# Patient Record
Sex: MALE | Race: WHITE | Employment: OTHER | URBAN - METROPOLITAN AREA
[De-identification: names, ages, dates, MRNs, and addresses within clinical notes are randomized per-mention and may not be internally consistent; named-entity substitution may affect disease eponyms.]

---

## 2017-01-11 ENCOUNTER — CARE COORDINATION (OUTPATIENT)
Dept: CARE COORDINATION | Age: 57
End: 2017-01-11

## 2017-01-12 ENCOUNTER — CARE COORDINATION (OUTPATIENT)
Dept: CARE COORDINATION | Age: 57
End: 2017-01-12

## 2017-01-12 ENCOUNTER — OFFICE VISIT (OUTPATIENT)
Dept: ORTHOPEDIC SURGERY | Age: 57
End: 2017-01-12

## 2017-01-12 VITALS
HEIGHT: 70 IN | SYSTOLIC BLOOD PRESSURE: 119 MMHG | BODY MASS INDEX: 35.79 KG/M2 | WEIGHT: 250 LBS | DIASTOLIC BLOOD PRESSURE: 79 MMHG | HEART RATE: 76 BPM

## 2017-01-12 DIAGNOSIS — M17.10 LOCALIZED OSTEOARTHROSIS, LOWER LEG: ICD-10-CM

## 2017-01-12 DIAGNOSIS — Z96.652 STATUS POST TOTAL LEFT KNEE REPLACEMENT: Primary | ICD-10-CM

## 2017-01-12 PROCEDURE — 99024 POSTOP FOLLOW-UP VISIT: CPT | Performed by: ORTHOPAEDIC SURGERY

## 2017-01-16 ENCOUNTER — OFFICE VISIT (OUTPATIENT)
Dept: PAIN MANAGEMENT | Age: 57
End: 2017-01-16

## 2017-01-16 VITALS
SYSTOLIC BLOOD PRESSURE: 131 MMHG | BODY MASS INDEX: 36.79 KG/M2 | WEIGHT: 257 LBS | HEART RATE: 73 BPM | DIASTOLIC BLOOD PRESSURE: 78 MMHG

## 2017-01-16 DIAGNOSIS — G89.4 CHRONIC PAIN SYNDROME: ICD-10-CM

## 2017-01-16 DIAGNOSIS — M54.16 LUMBAR RADICULITIS: ICD-10-CM

## 2017-01-16 DIAGNOSIS — M50.30 DDD (DEGENERATIVE DISC DISEASE), CERVICAL: ICD-10-CM

## 2017-01-16 DIAGNOSIS — M51.36 DDD (DEGENERATIVE DISC DISEASE), LUMBAR: ICD-10-CM

## 2017-01-16 DIAGNOSIS — M17.12 PRIMARY OSTEOARTHRITIS OF LEFT KNEE: ICD-10-CM

## 2017-01-16 DIAGNOSIS — M79.18 MYOFASCIAL PAIN: ICD-10-CM

## 2017-01-16 PROCEDURE — G8484 FLU IMMUNIZE NO ADMIN: HCPCS | Performed by: INTERNAL MEDICINE

## 2017-01-16 PROCEDURE — G8419 CALC BMI OUT NRM PARAM NOF/U: HCPCS | Performed by: INTERNAL MEDICINE

## 2017-01-16 PROCEDURE — 1036F TOBACCO NON-USER: CPT | Performed by: INTERNAL MEDICINE

## 2017-01-16 PROCEDURE — 99213 OFFICE O/P EST LOW 20 MIN: CPT | Performed by: INTERNAL MEDICINE

## 2017-01-16 PROCEDURE — 3017F COLORECTAL CA SCREEN DOC REV: CPT | Performed by: INTERNAL MEDICINE

## 2017-01-16 PROCEDURE — G8427 DOCREV CUR MEDS BY ELIG CLIN: HCPCS | Performed by: INTERNAL MEDICINE

## 2017-01-16 RX ORDER — LUBIPROSTONE 24 UG/1
24 CAPSULE, GELATIN COATED ORAL 2 TIMES DAILY WITH MEALS
Qty: 60 CAPSULE | Refills: 0 | Status: SHIPPED | OUTPATIENT
Start: 2017-01-16 | End: 2017-02-14 | Stop reason: SDUPTHER

## 2017-01-16 RX ORDER — DULOXETIN HYDROCHLORIDE 60 MG/1
CAPSULE, DELAYED RELEASE ORAL
Qty: 30 CAPSULE | Refills: 0 | Status: SHIPPED | OUTPATIENT
Start: 2017-01-16 | End: 2017-02-14 | Stop reason: SDUPTHER

## 2017-01-16 RX ORDER — OXYCODONE AND ACETAMINOPHEN 7.5; 325 MG/1; MG/1
1 TABLET ORAL EVERY 6 HOURS PRN
Qty: 112 TABLET | Refills: 0 | Status: SHIPPED | OUTPATIENT
Start: 2017-01-16 | End: 2017-02-14 | Stop reason: SDUPTHER

## 2017-01-16 RX ORDER — AMITRIPTYLINE HYDROCHLORIDE 25 MG/1
25 TABLET, FILM COATED ORAL NIGHTLY
Qty: 30 TABLET | Refills: 0 | Status: SHIPPED | OUTPATIENT
Start: 2017-01-16 | End: 2017-02-14 | Stop reason: SDUPTHER

## 2017-01-16 RX ORDER — PREGABALIN 75 MG/1
CAPSULE ORAL
Qty: 90 CAPSULE | Refills: 0 | Status: SHIPPED | OUTPATIENT
Start: 2017-01-16 | End: 2017-02-14 | Stop reason: SDUPTHER

## 2017-01-18 RX ORDER — LOSARTAN POTASSIUM 100 MG/1
TABLET ORAL
Qty: 90 TABLET | Refills: 0 | Status: SHIPPED | OUTPATIENT
Start: 2017-01-18 | End: 2017-02-15 | Stop reason: SDUPTHER

## 2017-01-18 RX ORDER — AMLODIPINE BESYLATE 10 MG/1
TABLET ORAL
Qty: 30 TABLET | Refills: 3 | Status: SHIPPED | OUTPATIENT
Start: 2017-01-18 | End: 2017-05-09 | Stop reason: SDUPTHER

## 2017-02-14 ENCOUNTER — OFFICE VISIT (OUTPATIENT)
Dept: PAIN MANAGEMENT | Age: 57
End: 2017-02-14

## 2017-02-14 VITALS
HEART RATE: 74 BPM | DIASTOLIC BLOOD PRESSURE: 94 MMHG | BODY MASS INDEX: 36.94 KG/M2 | WEIGHT: 258 LBS | SYSTOLIC BLOOD PRESSURE: 150 MMHG

## 2017-02-14 DIAGNOSIS — M54.16 LUMBAR RADICULITIS: ICD-10-CM

## 2017-02-14 DIAGNOSIS — G89.4 CHRONIC PAIN SYNDROME: ICD-10-CM

## 2017-02-14 DIAGNOSIS — M17.10 LOCALIZED OSTEOARTHROSIS, LOWER LEG: ICD-10-CM

## 2017-02-14 DIAGNOSIS — M51.36 DDD (DEGENERATIVE DISC DISEASE), LUMBAR: ICD-10-CM

## 2017-02-14 DIAGNOSIS — M50.30 DDD (DEGENERATIVE DISC DISEASE), CERVICAL: ICD-10-CM

## 2017-02-14 DIAGNOSIS — M79.18 MYOFASCIAL PAIN: ICD-10-CM

## 2017-02-14 DIAGNOSIS — M17.12 PRIMARY OSTEOARTHRITIS OF LEFT KNEE: ICD-10-CM

## 2017-02-14 PROCEDURE — 1036F TOBACCO NON-USER: CPT | Performed by: INTERNAL MEDICINE

## 2017-02-14 PROCEDURE — G8427 DOCREV CUR MEDS BY ELIG CLIN: HCPCS | Performed by: INTERNAL MEDICINE

## 2017-02-14 PROCEDURE — G8419 CALC BMI OUT NRM PARAM NOF/U: HCPCS | Performed by: INTERNAL MEDICINE

## 2017-02-14 PROCEDURE — G8484 FLU IMMUNIZE NO ADMIN: HCPCS | Performed by: INTERNAL MEDICINE

## 2017-02-14 PROCEDURE — 3017F COLORECTAL CA SCREEN DOC REV: CPT | Performed by: INTERNAL MEDICINE

## 2017-02-14 PROCEDURE — 99213 OFFICE O/P EST LOW 20 MIN: CPT | Performed by: INTERNAL MEDICINE

## 2017-02-14 RX ORDER — LUBIPROSTONE 24 UG/1
24 CAPSULE, GELATIN COATED ORAL 2 TIMES DAILY WITH MEALS
Qty: 60 CAPSULE | Refills: 0 | Status: SHIPPED | OUTPATIENT
Start: 2017-02-14 | End: 2017-03-14 | Stop reason: SDUPTHER

## 2017-02-14 RX ORDER — PREGABALIN 75 MG/1
CAPSULE ORAL
Qty: 90 CAPSULE | Refills: 0 | Status: SHIPPED | OUTPATIENT
Start: 2017-02-14 | End: 2017-03-14 | Stop reason: SDUPTHER

## 2017-02-14 RX ORDER — AMITRIPTYLINE HYDROCHLORIDE 25 MG/1
25 TABLET, FILM COATED ORAL NIGHTLY
Qty: 30 TABLET | Refills: 0 | Status: SHIPPED | OUTPATIENT
Start: 2017-02-14 | End: 2017-03-14 | Stop reason: SDUPTHER

## 2017-02-14 RX ORDER — DULOXETIN HYDROCHLORIDE 60 MG/1
CAPSULE, DELAYED RELEASE ORAL
Qty: 30 CAPSULE | Refills: 0 | Status: SHIPPED | OUTPATIENT
Start: 2017-02-14 | End: 2017-03-14 | Stop reason: SDUPTHER

## 2017-02-14 RX ORDER — OXYCODONE AND ACETAMINOPHEN 7.5; 325 MG/1; MG/1
1 TABLET ORAL EVERY 6 HOURS PRN
Qty: 112 TABLET | Refills: 0 | Status: SHIPPED | OUTPATIENT
Start: 2017-02-14 | End: 2017-03-14 | Stop reason: SDUPTHER

## 2017-02-15 DIAGNOSIS — M54.16 LUMBAR RADICULITIS: ICD-10-CM

## 2017-02-15 DIAGNOSIS — M50.30 DDD (DEGENERATIVE DISC DISEASE), CERVICAL: ICD-10-CM

## 2017-02-15 DIAGNOSIS — M79.18 MYOFASCIAL PAIN: ICD-10-CM

## 2017-02-15 DIAGNOSIS — G89.4 CHRONIC PAIN SYNDROME: ICD-10-CM

## 2017-02-15 DIAGNOSIS — M17.12 PRIMARY OSTEOARTHRITIS OF LEFT KNEE: ICD-10-CM

## 2017-02-15 DIAGNOSIS — M17.10 LOCALIZED OSTEOARTHROSIS, LOWER LEG: ICD-10-CM

## 2017-02-15 DIAGNOSIS — M51.36 DDD (DEGENERATIVE DISC DISEASE), LUMBAR: ICD-10-CM

## 2017-02-15 RX ORDER — LOSARTAN POTASSIUM 100 MG/1
TABLET ORAL
Qty: 90 TABLET | Refills: 1 | Status: SHIPPED | OUTPATIENT
Start: 2017-02-15 | End: 2017-10-05 | Stop reason: SDUPTHER

## 2017-02-16 RX ORDER — OXYCODONE AND ACETAMINOPHEN 7.5; 325 MG/1; MG/1
TABLET ORAL
Qty: 112 TABLET | Refills: 0 | OUTPATIENT
Start: 2017-02-16

## 2017-03-02 ENCOUNTER — CARE COORDINATION (OUTPATIENT)
Dept: CARE COORDINATION | Age: 57
End: 2017-03-02

## 2017-03-06 ENCOUNTER — TELEPHONE (OUTPATIENT)
Dept: FAMILY MEDICINE CLINIC | Age: 57
End: 2017-03-06

## 2017-03-08 ENCOUNTER — OFFICE VISIT (OUTPATIENT)
Dept: FAMILY MEDICINE CLINIC | Age: 57
End: 2017-03-08

## 2017-03-08 VITALS
OXYGEN SATURATION: 96 % | SYSTOLIC BLOOD PRESSURE: 160 MMHG | RESPIRATION RATE: 16 BRPM | TEMPERATURE: 98.7 F | WEIGHT: 258 LBS | HEIGHT: 70 IN | HEART RATE: 83 BPM | BODY MASS INDEX: 36.94 KG/M2 | DIASTOLIC BLOOD PRESSURE: 106 MMHG

## 2017-03-08 DIAGNOSIS — R10.30 LOWER ABDOMINAL PAIN: ICD-10-CM

## 2017-03-08 DIAGNOSIS — M50.30 DEGENERATIVE DISC DISEASE, CERVICAL: ICD-10-CM

## 2017-03-08 DIAGNOSIS — R25.1 OCCASIONAL TREMORS: ICD-10-CM

## 2017-03-08 DIAGNOSIS — I10 ESSENTIAL HYPERTENSION: Primary | ICD-10-CM

## 2017-03-08 LAB
A/G RATIO: 1.7 (ref 1.1–2.2)
ALBUMIN SERPL-MCNC: 4.7 G/DL (ref 3.4–5)
ALP BLD-CCNC: 104 U/L (ref 40–129)
ALT SERPL-CCNC: 25 U/L (ref 10–40)
ANION GAP SERPL CALCULATED.3IONS-SCNC: 15 MMOL/L (ref 3–16)
AST SERPL-CCNC: 19 U/L (ref 15–37)
BILIRUB SERPL-MCNC: <0.2 MG/DL (ref 0–1)
BILIRUBIN, POC: NORMAL
BLOOD URINE, POC: NORMAL
BUN BLDV-MCNC: 16 MG/DL (ref 7–20)
CALCIUM SERPL-MCNC: 9.7 MG/DL (ref 8.3–10.6)
CHLORIDE BLD-SCNC: 100 MMOL/L (ref 99–110)
CLARITY, POC: CLEAR
CO2: 28 MMOL/L (ref 21–32)
COLOR, POC: YELLOW
CREAT SERPL-MCNC: 1 MG/DL (ref 0.9–1.3)
GFR AFRICAN AMERICAN: >60
GFR NON-AFRICAN AMERICAN: >60
GLOBULIN: 2.7 G/DL
GLUCOSE BLD-MCNC: 136 MG/DL (ref 70–99)
GLUCOSE URINE, POC: NORMAL
HCT VFR BLD CALC: 48.5 % (ref 40.5–52.5)
HEMOGLOBIN: 15.7 G/DL (ref 13.5–17.5)
KETONES, POC: NORMAL
LEUKOCYTE EST, POC: NORMAL
MCH RBC QN AUTO: 28.8 PG (ref 26–34)
MCHC RBC AUTO-ENTMCNC: 32.5 G/DL (ref 31–36)
MCV RBC AUTO: 88.6 FL (ref 80–100)
NITRITE, POC: NORMAL
PDW BLD-RTO: 14.2 % (ref 12.4–15.4)
PH, POC: 6
PLATELET # BLD: 225 K/UL (ref 135–450)
PMV BLD AUTO: 8.4 FL (ref 5–10.5)
POTASSIUM SERPL-SCNC: 5.1 MMOL/L (ref 3.5–5.1)
PROTEIN, POC: NORMAL
RBC # BLD: 5.47 M/UL (ref 4.2–5.9)
SODIUM BLD-SCNC: 143 MMOL/L (ref 136–145)
SPECIFIC GRAVITY, POC: 1.02
T4 FREE: 1.1 NG/DL (ref 0.9–1.8)
TOTAL PROTEIN: 7.4 G/DL (ref 6.4–8.2)
TSH SERPL DL<=0.05 MIU/L-ACNC: 2 UIU/ML (ref 0.27–4.2)
UROBILINOGEN, POC: 0.2
WBC # BLD: 5.6 K/UL (ref 4–11)

## 2017-03-08 PROCEDURE — G8427 DOCREV CUR MEDS BY ELIG CLIN: HCPCS | Performed by: FAMILY MEDICINE

## 2017-03-08 PROCEDURE — 3017F COLORECTAL CA SCREEN DOC REV: CPT | Performed by: FAMILY MEDICINE

## 2017-03-08 PROCEDURE — G8484 FLU IMMUNIZE NO ADMIN: HCPCS | Performed by: FAMILY MEDICINE

## 2017-03-08 PROCEDURE — 36415 COLL VENOUS BLD VENIPUNCTURE: CPT | Performed by: FAMILY MEDICINE

## 2017-03-08 PROCEDURE — 1036F TOBACCO NON-USER: CPT | Performed by: FAMILY MEDICINE

## 2017-03-08 PROCEDURE — G8419 CALC BMI OUT NRM PARAM NOF/U: HCPCS | Performed by: FAMILY MEDICINE

## 2017-03-08 PROCEDURE — 99214 OFFICE O/P EST MOD 30 MIN: CPT | Performed by: FAMILY MEDICINE

## 2017-03-08 PROCEDURE — 81002 URINALYSIS NONAUTO W/O SCOPE: CPT | Performed by: FAMILY MEDICINE

## 2017-03-08 RX ORDER — METOPROLOL SUCCINATE 50 MG/1
50 TABLET, EXTENDED RELEASE ORAL DAILY
Qty: 30 TABLET | Refills: 5 | Status: SHIPPED | OUTPATIENT
Start: 2017-03-08 | End: 2018-01-11 | Stop reason: ALTCHOICE

## 2017-03-08 ASSESSMENT — ENCOUNTER SYMPTOMS
VOMITING: 0
EYES NEGATIVE: 1
BLOOD IN STOOL: 0
RECTAL PAIN: 0
ANAL BLEEDING: 0
RESPIRATORY NEGATIVE: 1
ABDOMINAL DISTENTION: 0
NAUSEA: 0
ABDOMINAL PAIN: 1
DIARRHEA: 0
CONSTIPATION: 0

## 2017-03-09 ENCOUNTER — TELEPHONE (OUTPATIENT)
Dept: ORTHOPEDIC SURGERY | Age: 57
End: 2017-03-09

## 2017-03-13 ENCOUNTER — TELEPHONE (OUTPATIENT)
Dept: FAMILY MEDICINE CLINIC | Age: 57
End: 2017-03-13

## 2017-03-14 ENCOUNTER — OFFICE VISIT (OUTPATIENT)
Dept: PAIN MANAGEMENT | Age: 57
End: 2017-03-14

## 2017-03-14 VITALS
WEIGHT: 258 LBS | BODY MASS INDEX: 37.02 KG/M2 | HEART RATE: 66 BPM | SYSTOLIC BLOOD PRESSURE: 140 MMHG | DIASTOLIC BLOOD PRESSURE: 88 MMHG

## 2017-03-14 DIAGNOSIS — M50.30 DDD (DEGENERATIVE DISC DISEASE), CERVICAL: ICD-10-CM

## 2017-03-14 DIAGNOSIS — M79.18 MYOFASCIAL PAIN: ICD-10-CM

## 2017-03-14 DIAGNOSIS — M17.10 LOCALIZED OSTEOARTHROSIS, LOWER LEG: ICD-10-CM

## 2017-03-14 DIAGNOSIS — M51.36 DDD (DEGENERATIVE DISC DISEASE), LUMBAR: ICD-10-CM

## 2017-03-14 DIAGNOSIS — M54.16 LUMBAR RADICULITIS: ICD-10-CM

## 2017-03-14 DIAGNOSIS — M17.12 PRIMARY OSTEOARTHRITIS OF LEFT KNEE: ICD-10-CM

## 2017-03-14 DIAGNOSIS — G89.4 CHRONIC PAIN SYNDROME: ICD-10-CM

## 2017-03-14 PROCEDURE — G8484 FLU IMMUNIZE NO ADMIN: HCPCS | Performed by: INTERNAL MEDICINE

## 2017-03-14 PROCEDURE — 3017F COLORECTAL CA SCREEN DOC REV: CPT | Performed by: INTERNAL MEDICINE

## 2017-03-14 PROCEDURE — 1036F TOBACCO NON-USER: CPT | Performed by: INTERNAL MEDICINE

## 2017-03-14 PROCEDURE — 99213 OFFICE O/P EST LOW 20 MIN: CPT | Performed by: INTERNAL MEDICINE

## 2017-03-14 PROCEDURE — G8417 CALC BMI ABV UP PARAM F/U: HCPCS | Performed by: INTERNAL MEDICINE

## 2017-03-14 PROCEDURE — G8427 DOCREV CUR MEDS BY ELIG CLIN: HCPCS | Performed by: INTERNAL MEDICINE

## 2017-03-14 RX ORDER — OXYCODONE AND ACETAMINOPHEN 7.5; 325 MG/1; MG/1
1 TABLET ORAL EVERY 6 HOURS PRN
Qty: 112 TABLET | Refills: 0 | Status: SHIPPED | OUTPATIENT
Start: 2017-03-14 | End: 2017-04-11 | Stop reason: SDUPTHER

## 2017-03-14 RX ORDER — AMITRIPTYLINE HYDROCHLORIDE 25 MG/1
25 TABLET, FILM COATED ORAL NIGHTLY
Qty: 30 TABLET | Refills: 0 | Status: SHIPPED | OUTPATIENT
Start: 2017-03-14 | End: 2017-04-11

## 2017-03-14 RX ORDER — DULOXETIN HYDROCHLORIDE 60 MG/1
CAPSULE, DELAYED RELEASE ORAL
Qty: 30 CAPSULE | Refills: 0 | Status: SHIPPED | OUTPATIENT
Start: 2017-03-14 | End: 2017-04-11 | Stop reason: SDUPTHER

## 2017-03-14 RX ORDER — PREGABALIN 75 MG/1
CAPSULE ORAL
Qty: 90 CAPSULE | Refills: 0 | Status: SHIPPED | OUTPATIENT
Start: 2017-03-14 | End: 2017-05-09 | Stop reason: SDUPTHER

## 2017-03-14 RX ORDER — LUBIPROSTONE 24 UG/1
24 CAPSULE, GELATIN COATED ORAL 2 TIMES DAILY WITH MEALS
Qty: 60 CAPSULE | Refills: 0 | Status: SHIPPED | OUTPATIENT
Start: 2017-03-14 | End: 2017-04-11 | Stop reason: SDUPTHER

## 2017-03-14 RX ORDER — METOPROLOL TARTRATE 100 MG/1
100 TABLET ORAL 2 TIMES DAILY
COMMUNITY
End: 2017-03-16 | Stop reason: SDUPTHER

## 2017-03-16 ENCOUNTER — TELEPHONE (OUTPATIENT)
Dept: FAMILY MEDICINE CLINIC | Age: 57
End: 2017-03-16

## 2017-03-16 DIAGNOSIS — I10 ESSENTIAL HYPERTENSION: ICD-10-CM

## 2017-03-16 DIAGNOSIS — I10 ESSENTIAL HYPERTENSION: Primary | ICD-10-CM

## 2017-03-16 RX ORDER — METOPROLOL TARTRATE 100 MG/1
100 TABLET ORAL DAILY
Qty: 90 TABLET | Refills: 1 | Status: SHIPPED | OUTPATIENT
Start: 2017-03-16 | End: 2018-01-11 | Stop reason: ALTCHOICE

## 2017-04-11 ENCOUNTER — OFFICE VISIT (OUTPATIENT)
Dept: PAIN MANAGEMENT | Age: 57
End: 2017-04-11

## 2017-04-11 VITALS
HEART RATE: 61 BPM | SYSTOLIC BLOOD PRESSURE: 124 MMHG | WEIGHT: 258 LBS | DIASTOLIC BLOOD PRESSURE: 80 MMHG | BODY MASS INDEX: 37.02 KG/M2

## 2017-04-11 DIAGNOSIS — M51.36 DDD (DEGENERATIVE DISC DISEASE), LUMBAR: ICD-10-CM

## 2017-04-11 DIAGNOSIS — M50.30 DDD (DEGENERATIVE DISC DISEASE), CERVICAL: ICD-10-CM

## 2017-04-11 DIAGNOSIS — M17.10 LOCALIZED OSTEOARTHROSIS, LOWER LEG: ICD-10-CM

## 2017-04-11 DIAGNOSIS — M17.12 PRIMARY OSTEOARTHRITIS OF LEFT KNEE: ICD-10-CM

## 2017-04-11 DIAGNOSIS — G89.4 CHRONIC PAIN SYNDROME: ICD-10-CM

## 2017-04-11 DIAGNOSIS — M54.16 LUMBAR RADICULITIS: ICD-10-CM

## 2017-04-11 DIAGNOSIS — M79.18 MYOFASCIAL PAIN: ICD-10-CM

## 2017-04-11 PROCEDURE — 3017F COLORECTAL CA SCREEN DOC REV: CPT | Performed by: INTERNAL MEDICINE

## 2017-04-11 PROCEDURE — G8417 CALC BMI ABV UP PARAM F/U: HCPCS | Performed by: INTERNAL MEDICINE

## 2017-04-11 PROCEDURE — 99213 OFFICE O/P EST LOW 20 MIN: CPT | Performed by: INTERNAL MEDICINE

## 2017-04-11 PROCEDURE — 1036F TOBACCO NON-USER: CPT | Performed by: INTERNAL MEDICINE

## 2017-04-11 PROCEDURE — G8427 DOCREV CUR MEDS BY ELIG CLIN: HCPCS | Performed by: INTERNAL MEDICINE

## 2017-04-11 RX ORDER — DULOXETIN HYDROCHLORIDE 60 MG/1
CAPSULE, DELAYED RELEASE ORAL
Qty: 30 CAPSULE | Refills: 0 | Status: SHIPPED | OUTPATIENT
Start: 2017-04-11 | End: 2017-05-09 | Stop reason: SDUPTHER

## 2017-04-11 RX ORDER — OXYCODONE AND ACETAMINOPHEN 7.5; 325 MG/1; MG/1
1 TABLET ORAL EVERY 6 HOURS PRN
Qty: 112 TABLET | Refills: 0 | Status: SHIPPED | OUTPATIENT
Start: 2017-04-11 | End: 2017-05-09 | Stop reason: SDUPTHER

## 2017-04-11 RX ORDER — LUBIPROSTONE 24 UG/1
24 CAPSULE, GELATIN COATED ORAL 2 TIMES DAILY WITH MEALS
Qty: 60 CAPSULE | Refills: 0 | Status: SHIPPED | OUTPATIENT
Start: 2017-04-11 | End: 2017-05-09 | Stop reason: SDUPTHER

## 2017-05-09 ENCOUNTER — OFFICE VISIT (OUTPATIENT)
Dept: PAIN MANAGEMENT | Age: 57
End: 2017-05-09

## 2017-05-09 VITALS
WEIGHT: 258 LBS | SYSTOLIC BLOOD PRESSURE: 131 MMHG | BODY MASS INDEX: 37.02 KG/M2 | DIASTOLIC BLOOD PRESSURE: 86 MMHG | HEART RATE: 59 BPM

## 2017-05-09 DIAGNOSIS — M17.12 PRIMARY OSTEOARTHRITIS OF LEFT KNEE: ICD-10-CM

## 2017-05-09 DIAGNOSIS — M50.30 DDD (DEGENERATIVE DISC DISEASE), CERVICAL: ICD-10-CM

## 2017-05-09 DIAGNOSIS — M17.10 LOCALIZED OSTEOARTHROSIS, LOWER LEG: ICD-10-CM

## 2017-05-09 DIAGNOSIS — E03.9 ACQUIRED HYPOTHYROIDISM: ICD-10-CM

## 2017-05-09 DIAGNOSIS — M54.16 LUMBAR RADICULITIS: ICD-10-CM

## 2017-05-09 DIAGNOSIS — G89.4 CHRONIC PAIN SYNDROME: ICD-10-CM

## 2017-05-09 DIAGNOSIS — M79.18 MYOFASCIAL PAIN: ICD-10-CM

## 2017-05-09 DIAGNOSIS — M51.36 DDD (DEGENERATIVE DISC DISEASE), LUMBAR: ICD-10-CM

## 2017-05-09 PROCEDURE — G8417 CALC BMI ABV UP PARAM F/U: HCPCS | Performed by: INTERNAL MEDICINE

## 2017-05-09 PROCEDURE — G8427 DOCREV CUR MEDS BY ELIG CLIN: HCPCS | Performed by: INTERNAL MEDICINE

## 2017-05-09 PROCEDURE — 3017F COLORECTAL CA SCREEN DOC REV: CPT | Performed by: INTERNAL MEDICINE

## 2017-05-09 PROCEDURE — 99213 OFFICE O/P EST LOW 20 MIN: CPT | Performed by: INTERNAL MEDICINE

## 2017-05-09 PROCEDURE — 1036F TOBACCO NON-USER: CPT | Performed by: INTERNAL MEDICINE

## 2017-05-09 RX ORDER — LEVOTHYROXINE SODIUM 0.03 MG/1
TABLET ORAL
Qty: 30 TABLET | Refills: 2 | Status: SHIPPED | OUTPATIENT
Start: 2017-05-09 | End: 2017-08-08 | Stop reason: SDUPTHER

## 2017-05-09 RX ORDER — LUBIPROSTONE 24 UG/1
24 CAPSULE, GELATIN COATED ORAL 2 TIMES DAILY WITH MEALS
Qty: 60 CAPSULE | Refills: 0 | Status: SHIPPED | OUTPATIENT
Start: 2017-05-09 | End: 2017-06-09 | Stop reason: SDUPTHER

## 2017-05-09 RX ORDER — OXYCODONE AND ACETAMINOPHEN 7.5; 325 MG/1; MG/1
1 TABLET ORAL EVERY 6 HOURS PRN
Qty: 112 TABLET | Refills: 0 | Status: SHIPPED | OUTPATIENT
Start: 2017-05-09 | End: 2017-06-09 | Stop reason: SDUPTHER

## 2017-05-09 RX ORDER — DULOXETIN HYDROCHLORIDE 60 MG/1
CAPSULE, DELAYED RELEASE ORAL
Qty: 30 CAPSULE | Refills: 0 | Status: SHIPPED | OUTPATIENT
Start: 2017-05-09 | End: 2017-06-09 | Stop reason: SDUPTHER

## 2017-05-09 RX ORDER — PREGABALIN 75 MG/1
CAPSULE ORAL
Qty: 90 CAPSULE | Refills: 0 | Status: SHIPPED | OUTPATIENT
Start: 2017-05-09 | End: 2017-06-09 | Stop reason: SDUPTHER

## 2017-05-09 RX ORDER — AMLODIPINE BESYLATE 10 MG/1
TABLET ORAL
Qty: 30 TABLET | Refills: 2 | Status: SHIPPED | OUTPATIENT
Start: 2017-05-09 | End: 2017-08-08 | Stop reason: SDUPTHER

## 2017-06-09 ENCOUNTER — OFFICE VISIT (OUTPATIENT)
Dept: PAIN MANAGEMENT | Age: 57
End: 2017-06-09

## 2017-06-09 VITALS
SYSTOLIC BLOOD PRESSURE: 132 MMHG | BODY MASS INDEX: 37.31 KG/M2 | HEART RATE: 59 BPM | DIASTOLIC BLOOD PRESSURE: 87 MMHG | WEIGHT: 260 LBS

## 2017-06-09 DIAGNOSIS — M51.36 DDD (DEGENERATIVE DISC DISEASE), LUMBAR: ICD-10-CM

## 2017-06-09 DIAGNOSIS — M54.16 LUMBAR RADICULITIS: ICD-10-CM

## 2017-06-09 DIAGNOSIS — M17.12 PRIMARY OSTEOARTHRITIS OF LEFT KNEE: ICD-10-CM

## 2017-06-09 DIAGNOSIS — M79.18 MYOFASCIAL PAIN: ICD-10-CM

## 2017-06-09 DIAGNOSIS — M50.30 DDD (DEGENERATIVE DISC DISEASE), CERVICAL: ICD-10-CM

## 2017-06-09 DIAGNOSIS — G89.4 CHRONIC PAIN SYNDROME: ICD-10-CM

## 2017-06-09 PROCEDURE — G8417 CALC BMI ABV UP PARAM F/U: HCPCS | Performed by: INTERNAL MEDICINE

## 2017-06-09 PROCEDURE — 3017F COLORECTAL CA SCREEN DOC REV: CPT | Performed by: INTERNAL MEDICINE

## 2017-06-09 PROCEDURE — G8427 DOCREV CUR MEDS BY ELIG CLIN: HCPCS | Performed by: INTERNAL MEDICINE

## 2017-06-09 PROCEDURE — 1036F TOBACCO NON-USER: CPT | Performed by: INTERNAL MEDICINE

## 2017-06-09 PROCEDURE — 99213 OFFICE O/P EST LOW 20 MIN: CPT | Performed by: INTERNAL MEDICINE

## 2017-06-09 RX ORDER — PREGABALIN 75 MG/1
CAPSULE ORAL
Qty: 90 CAPSULE | Refills: 0 | Status: SHIPPED | OUTPATIENT
Start: 2017-06-09 | End: 2017-07-10 | Stop reason: SDUPTHER

## 2017-06-09 RX ORDER — OXYCODONE AND ACETAMINOPHEN 7.5; 325 MG/1; MG/1
1 TABLET ORAL EVERY 6 HOURS PRN
Qty: 112 TABLET | Refills: 0 | Status: SHIPPED | OUTPATIENT
Start: 2017-06-09 | End: 2017-07-10 | Stop reason: SDUPTHER

## 2017-06-09 RX ORDER — LUBIPROSTONE 24 UG/1
24 CAPSULE, GELATIN COATED ORAL 2 TIMES DAILY WITH MEALS
Qty: 60 CAPSULE | Refills: 0 | Status: SHIPPED | OUTPATIENT
Start: 2017-06-09 | End: 2017-08-08 | Stop reason: SDUPTHER

## 2017-06-09 RX ORDER — DULOXETIN HYDROCHLORIDE 60 MG/1
CAPSULE, DELAYED RELEASE ORAL
Qty: 30 CAPSULE | Refills: 0 | Status: SHIPPED | OUTPATIENT
Start: 2017-06-09 | End: 2017-07-10 | Stop reason: SDUPTHER

## 2017-06-16 ENCOUNTER — TELEPHONE (OUTPATIENT)
Dept: FAMILY MEDICINE CLINIC | Age: 57
End: 2017-06-16

## 2017-07-10 ENCOUNTER — OFFICE VISIT (OUTPATIENT)
Dept: PAIN MANAGEMENT | Age: 57
End: 2017-07-10

## 2017-07-10 VITALS
HEART RATE: 83 BPM | BODY MASS INDEX: 37.42 KG/M2 | DIASTOLIC BLOOD PRESSURE: 82 MMHG | WEIGHT: 260.8 LBS | SYSTOLIC BLOOD PRESSURE: 131 MMHG

## 2017-07-10 DIAGNOSIS — M50.30 DDD (DEGENERATIVE DISC DISEASE), CERVICAL: ICD-10-CM

## 2017-07-10 DIAGNOSIS — M79.18 MYOFASCIAL PAIN: ICD-10-CM

## 2017-07-10 DIAGNOSIS — M54.16 LUMBAR RADICULITIS: ICD-10-CM

## 2017-07-10 DIAGNOSIS — G89.4 CHRONIC PAIN SYNDROME: ICD-10-CM

## 2017-07-10 DIAGNOSIS — M17.12 PRIMARY OSTEOARTHRITIS OF LEFT KNEE: ICD-10-CM

## 2017-07-10 DIAGNOSIS — M97.11XS PERIPROSTHETIC FRACTURE AROUND INTERNAL PROSTHETIC RIGHT KNEE JOINT, SEQUELA: ICD-10-CM

## 2017-07-10 DIAGNOSIS — M51.36 DDD (DEGENERATIVE DISC DISEASE), LUMBAR: ICD-10-CM

## 2017-07-10 PROCEDURE — 99213 OFFICE O/P EST LOW 20 MIN: CPT | Performed by: INTERNAL MEDICINE

## 2017-07-10 RX ORDER — PREGABALIN 75 MG/1
CAPSULE ORAL
Qty: 90 CAPSULE | Refills: 1 | Status: SHIPPED | OUTPATIENT
Start: 2017-07-10 | End: 2017-09-12 | Stop reason: SDUPTHER

## 2017-07-10 RX ORDER — OXYCODONE AND ACETAMINOPHEN 7.5; 325 MG/1; MG/1
1 TABLET ORAL EVERY 6 HOURS PRN
Qty: 112 TABLET | Refills: 0 | Status: SHIPPED | OUTPATIENT
Start: 2017-07-10 | End: 2017-08-08 | Stop reason: SDUPTHER

## 2017-07-10 RX ORDER — DULOXETIN HYDROCHLORIDE 60 MG/1
CAPSULE, DELAYED RELEASE ORAL
Qty: 30 CAPSULE | Refills: 1 | Status: SHIPPED | OUTPATIENT
Start: 2017-07-10 | End: 2017-08-08 | Stop reason: SDUPTHER

## 2017-08-08 ENCOUNTER — OFFICE VISIT (OUTPATIENT)
Dept: PAIN MANAGEMENT | Age: 57
End: 2017-08-08

## 2017-08-08 VITALS
SYSTOLIC BLOOD PRESSURE: 137 MMHG | WEIGHT: 250 LBS | BODY MASS INDEX: 35.87 KG/M2 | DIASTOLIC BLOOD PRESSURE: 84 MMHG | HEART RATE: 75 BPM

## 2017-08-08 DIAGNOSIS — E03.9 ACQUIRED HYPOTHYROIDISM: ICD-10-CM

## 2017-08-08 DIAGNOSIS — M54.16 LUMBAR RADICULITIS: ICD-10-CM

## 2017-08-08 DIAGNOSIS — M97.11XS PERIPROSTHETIC FRACTURE AROUND INTERNAL PROSTHETIC RIGHT KNEE JOINT, SEQUELA: ICD-10-CM

## 2017-08-08 DIAGNOSIS — M50.30 DDD (DEGENERATIVE DISC DISEASE), CERVICAL: ICD-10-CM

## 2017-08-08 DIAGNOSIS — M79.18 MYOFASCIAL PAIN: ICD-10-CM

## 2017-08-08 DIAGNOSIS — M17.12 PRIMARY OSTEOARTHRITIS OF LEFT KNEE: ICD-10-CM

## 2017-08-08 DIAGNOSIS — M51.36 DDD (DEGENERATIVE DISC DISEASE), LUMBAR: ICD-10-CM

## 2017-08-08 DIAGNOSIS — G89.4 CHRONIC PAIN SYNDROME: ICD-10-CM

## 2017-08-08 PROCEDURE — 99213 OFFICE O/P EST LOW 20 MIN: CPT | Performed by: INTERNAL MEDICINE

## 2017-08-08 RX ORDER — LEVOTHYROXINE SODIUM 0.03 MG/1
TABLET ORAL
Qty: 30 TABLET | Refills: 1 | Status: SHIPPED | OUTPATIENT
Start: 2017-08-08 | End: 2017-10-05 | Stop reason: SDUPTHER

## 2017-08-08 RX ORDER — OXYCODONE AND ACETAMINOPHEN 7.5; 325 MG/1; MG/1
1 TABLET ORAL EVERY 6 HOURS PRN
Qty: 120 TABLET | Refills: 0 | Status: SHIPPED | OUTPATIENT
Start: 2017-08-08 | End: 2017-09-12 | Stop reason: SDUPTHER

## 2017-08-08 RX ORDER — AMLODIPINE BESYLATE 10 MG/1
TABLET ORAL
Qty: 30 TABLET | Refills: 1 | Status: SHIPPED | OUTPATIENT
Start: 2017-08-08 | End: 2017-10-05 | Stop reason: SDUPTHER

## 2017-08-08 RX ORDER — LUBIPROSTONE 24 UG/1
24 CAPSULE, GELATIN COATED ORAL 2 TIMES DAILY WITH MEALS
Qty: 60 CAPSULE | Refills: 1 | Status: SHIPPED | OUTPATIENT
Start: 2017-08-08 | End: 2017-10-05 | Stop reason: SDUPTHER

## 2017-08-08 RX ORDER — DULOXETIN HYDROCHLORIDE 60 MG/1
CAPSULE, DELAYED RELEASE ORAL
Qty: 30 CAPSULE | Refills: 1 | Status: SHIPPED | OUTPATIENT
Start: 2017-08-08 | End: 2017-09-12 | Stop reason: SDUPTHER

## 2017-09-12 ENCOUNTER — OFFICE VISIT (OUTPATIENT)
Dept: PAIN MANAGEMENT | Age: 57
End: 2017-09-12

## 2017-09-12 VITALS
SYSTOLIC BLOOD PRESSURE: 157 MMHG | DIASTOLIC BLOOD PRESSURE: 96 MMHG | WEIGHT: 253 LBS | BODY MASS INDEX: 36.3 KG/M2 | HEART RATE: 71 BPM

## 2017-09-12 DIAGNOSIS — M50.30 DDD (DEGENERATIVE DISC DISEASE), CERVICAL: ICD-10-CM

## 2017-09-12 DIAGNOSIS — M17.12 PRIMARY OSTEOARTHRITIS OF LEFT KNEE: ICD-10-CM

## 2017-09-12 DIAGNOSIS — M97.11XS PERIPROSTHETIC FRACTURE AROUND INTERNAL PROSTHETIC RIGHT KNEE JOINT, SEQUELA: ICD-10-CM

## 2017-09-12 DIAGNOSIS — M54.16 LUMBAR RADICULITIS: ICD-10-CM

## 2017-09-12 DIAGNOSIS — G89.4 CHRONIC PAIN SYNDROME: ICD-10-CM

## 2017-09-12 DIAGNOSIS — M51.36 DDD (DEGENERATIVE DISC DISEASE), LUMBAR: ICD-10-CM

## 2017-09-12 DIAGNOSIS — M79.18 MYOFASCIAL PAIN: ICD-10-CM

## 2017-09-12 DIAGNOSIS — M17.10 LOCALIZED OSTEOARTHROSIS, LOWER LEG: ICD-10-CM

## 2017-09-12 PROCEDURE — 99213 OFFICE O/P EST LOW 20 MIN: CPT | Performed by: INTERNAL MEDICINE

## 2017-09-12 RX ORDER — OXYCODONE AND ACETAMINOPHEN 7.5; 325 MG/1; MG/1
1 TABLET ORAL EVERY 6 HOURS PRN
Qty: 112 TABLET | Refills: 0 | Status: SHIPPED | OUTPATIENT
Start: 2017-09-12 | End: 2017-10-10 | Stop reason: SDUPTHER

## 2017-09-12 RX ORDER — PREGABALIN 75 MG/1
CAPSULE ORAL
Qty: 90 CAPSULE | Refills: 0 | Status: SHIPPED | OUTPATIENT
Start: 2017-09-12 | End: 2017-10-10 | Stop reason: SDUPTHER

## 2017-09-12 RX ORDER — DULOXETIN HYDROCHLORIDE 60 MG/1
CAPSULE, DELAYED RELEASE ORAL
Qty: 30 CAPSULE | Refills: 0 | Status: SHIPPED | OUTPATIENT
Start: 2017-09-12 | End: 2017-10-10 | Stop reason: SDUPTHER

## 2017-10-05 DIAGNOSIS — E03.9 ACQUIRED HYPOTHYROIDISM: ICD-10-CM

## 2017-10-05 DIAGNOSIS — G89.4 CHRONIC PAIN SYNDROME: ICD-10-CM

## 2017-10-06 RX ORDER — LUBIPROSTONE 24 UG/1
CAPSULE, GELATIN COATED ORAL
Qty: 60 CAPSULE | Refills: 0 | Status: SHIPPED | OUTPATIENT
Start: 2017-10-06 | End: 2017-11-09 | Stop reason: SDUPTHER

## 2017-10-06 RX ORDER — LOSARTAN POTASSIUM 100 MG/1
TABLET ORAL
Qty: 60 TABLET | Refills: 0 | Status: SHIPPED | OUTPATIENT
Start: 2017-10-06 | End: 2017-11-08 | Stop reason: SDUPTHER

## 2017-10-06 RX ORDER — LEVOTHYROXINE SODIUM 0.03 MG/1
TABLET ORAL
Qty: 60 TABLET | Refills: 0 | Status: SHIPPED | OUTPATIENT
Start: 2017-10-06 | End: 2017-11-08 | Stop reason: SDUPTHER

## 2017-10-06 RX ORDER — AMLODIPINE BESYLATE 10 MG/1
TABLET ORAL
Qty: 60 TABLET | Refills: 0 | Status: SHIPPED | OUTPATIENT
Start: 2017-10-06 | End: 2017-11-08 | Stop reason: SDUPTHER

## 2017-10-10 ENCOUNTER — OFFICE VISIT (OUTPATIENT)
Dept: PAIN MANAGEMENT | Age: 57
End: 2017-10-10

## 2017-10-10 ENCOUNTER — TELEPHONE (OUTPATIENT)
Dept: PAIN MANAGEMENT | Age: 57
End: 2017-10-10

## 2017-10-10 VITALS
HEART RATE: 72 BPM | BODY MASS INDEX: 36.3 KG/M2 | WEIGHT: 253 LBS | DIASTOLIC BLOOD PRESSURE: 90 MMHG | SYSTOLIC BLOOD PRESSURE: 142 MMHG

## 2017-10-10 DIAGNOSIS — M50.30 DDD (DEGENERATIVE DISC DISEASE), CERVICAL: ICD-10-CM

## 2017-10-10 DIAGNOSIS — M97.11XS PERIPROSTHETIC FRACTURE AROUND INTERNAL PROSTHETIC RIGHT KNEE JOINT, SEQUELA: ICD-10-CM

## 2017-10-10 DIAGNOSIS — M17.10 LOCALIZED OSTEOARTHROSIS, LOWER LEG: ICD-10-CM

## 2017-10-10 DIAGNOSIS — G89.4 CHRONIC PAIN SYNDROME: ICD-10-CM

## 2017-10-10 DIAGNOSIS — M79.18 MYOFASCIAL PAIN: ICD-10-CM

## 2017-10-10 DIAGNOSIS — M51.36 DDD (DEGENERATIVE DISC DISEASE), LUMBAR: ICD-10-CM

## 2017-10-10 DIAGNOSIS — M17.12 PRIMARY OSTEOARTHRITIS OF LEFT KNEE: ICD-10-CM

## 2017-10-10 DIAGNOSIS — M54.16 LUMBAR RADICULITIS: ICD-10-CM

## 2017-10-10 PROCEDURE — 1036F TOBACCO NON-USER: CPT | Performed by: INTERNAL MEDICINE

## 2017-10-10 PROCEDURE — G8417 CALC BMI ABV UP PARAM F/U: HCPCS | Performed by: INTERNAL MEDICINE

## 2017-10-10 PROCEDURE — G8427 DOCREV CUR MEDS BY ELIG CLIN: HCPCS | Performed by: INTERNAL MEDICINE

## 2017-10-10 PROCEDURE — G8484 FLU IMMUNIZE NO ADMIN: HCPCS | Performed by: INTERNAL MEDICINE

## 2017-10-10 PROCEDURE — 99213 OFFICE O/P EST LOW 20 MIN: CPT | Performed by: INTERNAL MEDICINE

## 2017-10-10 PROCEDURE — 3017F COLORECTAL CA SCREEN DOC REV: CPT | Performed by: INTERNAL MEDICINE

## 2017-10-10 RX ORDER — PREGABALIN 75 MG/1
CAPSULE ORAL
Qty: 90 CAPSULE | Refills: 0
Start: 2017-10-10 | End: 2017-11-09

## 2017-10-10 RX ORDER — OXYCODONE AND ACETAMINOPHEN 7.5; 325 MG/1; MG/1
1 TABLET ORAL EVERY 6 HOURS PRN
Qty: 112 TABLET | Refills: 0
Start: 2017-10-10 | End: 2017-11-09 | Stop reason: SDUPTHER

## 2017-10-10 RX ORDER — DULOXETIN HYDROCHLORIDE 60 MG/1
CAPSULE, DELAYED RELEASE ORAL
Qty: 30 CAPSULE | Refills: 0 | Status: SHIPPED | OUTPATIENT
Start: 2017-10-10 | End: 2017-11-09 | Stop reason: SDUPTHER

## 2017-10-10 RX ORDER — OXYCODONE AND ACETAMINOPHEN 7.5; 325 MG/1; MG/1
1 TABLET ORAL EVERY 6 HOURS PRN
Qty: 112 TABLET | Refills: 0 | Status: SHIPPED | OUTPATIENT
Start: 2017-10-10 | End: 2017-10-10 | Stop reason: CLARIF

## 2017-10-10 RX ORDER — PREGABALIN 75 MG/1
CAPSULE ORAL
Qty: 90 CAPSULE | Refills: 0 | Status: SHIPPED | OUTPATIENT
Start: 2017-10-10 | End: 2017-10-10 | Stop reason: CLARIF

## 2017-10-10 NOTE — TELEPHONE ENCOUNTER
Pt states Lyrica is too expensive and he wants a replacement sent into [facility].  pls advise, he states [facility] told him to call if he could not afford it

## 2017-10-10 NOTE — PROGRESS NOTES
pleasure in his daily activities. Reports  fair  appetite, energy and concentration. Able to function well in different aspects of his daily activities. Denies suicidal or homicidal ideation. Denies any complaints of increased tension, does   Worry sometimes and occasional  irritability  he denies any c/o increased anxiety, No c/o panic attacks or symptoms of PTSD. Patient reports the Lyrica is too expensive. ALLERGIES: Patients list of allergies were reviewed     MEDICATIONS: Mr. Yulisa Monterroso list of medications were reviewed. His current medications are   Outpatient Medications Prior to Visit   Medication Sig Dispense Refill    amLODIPine (NORVASC) 10 MG tablet TAKE 1 TABLET BY MOUTH EVERY DAY 60 tablet 0    levothyroxine (SYNTHROID) 25 MCG tablet TAKE 1 TABLET BY MOUTH EVERY DAY 60 tablet 0    AMITIZA 24 MCG capsule Take 1 capsule by mouth 2 times daily (with meals) 60 capsule 0    losartan (COZAAR) 100 MG tablet TAKE 1 TABLET BY MOUTH EVERY DAY 60 tablet 0    oxyCODONE-acetaminophen (PERCOCET) 7.5-325 MG per tablet Take 1 tablet by mouth every 6 hours as needed for Pain  Max 4 per day. 112 tablet 0    DULoxetine (CYMBALTA) 60 MG extended release capsule TAKE ONE CAPSULE BY MOUTH EVERY DAY 30 capsule 0    pregabalin (LYRICA) 75 MG capsule Take 1 tab by mouth in the am and take 2 tabs by mouth every pm 90 capsule 0    metoprolol (LOPRESSOR) 100 MG tablet Take 1 tablet by mouth daily 90 tablet 1    metoprolol succinate (TOPROL XL) 50 MG extended release tablet Take 1 tablet by mouth daily 30 tablet 5    buPROPion (WELLBUTRIN SR) 150 MG extended release tablet Take 150 mg by mouth daily PT STATES TAKES 150 OF SR  OF XL DAILY      ondansetron (ZOFRAN) 4 MG tablet Take 1 tablet by mouth daily as needed for Nausea or Vomiting 30 tablet 1    buPROPion (WELLBUTRIN XL) 300 MG XL tablet Take 1 tablet by mouth every morning.  30 tablet 3    albuterol (PROAIR HFA) 108 (90 BASE) MCG/ACT inhaler Inhale 2 glasses of fluid daily), limit caffeine, avoid cheese products, increase dietary fiber, increase activity and exercise as tolerated and relax regularly and enjoy meals  -Continue with Percocet 3-4 times per day      Current Outpatient Prescriptions   Medication Sig Dispense Refill    amLODIPine (NORVASC) 10 MG tablet TAKE 1 TABLET BY MOUTH EVERY DAY 60 tablet 0    levothyroxine (SYNTHROID) 25 MCG tablet TAKE 1 TABLET BY MOUTH EVERY DAY 60 tablet 0    AMITIZA 24 MCG capsule Take 1 capsule by mouth 2 times daily (with meals) 60 capsule 0    losartan (COZAAR) 100 MG tablet TAKE 1 TABLET BY MOUTH EVERY DAY 60 tablet 0    oxyCODONE-acetaminophen (PERCOCET) 7.5-325 MG per tablet Take 1 tablet by mouth every 6 hours as needed for Pain  Max 4 per day. 112 tablet 0    DULoxetine (CYMBALTA) 60 MG extended release capsule TAKE ONE CAPSULE BY MOUTH EVERY DAY 30 capsule 0    pregabalin (LYRICA) 75 MG capsule Take 1 tab by mouth in the am and take 2 tabs by mouth every pm 90 capsule 0    metoprolol (LOPRESSOR) 100 MG tablet Take 1 tablet by mouth daily 90 tablet 1    metoprolol succinate (TOPROL XL) 50 MG extended release tablet Take 1 tablet by mouth daily 30 tablet 5    buPROPion (WELLBUTRIN SR) 150 MG extended release tablet Take 150 mg by mouth daily PT STATES TAKES 150 OF SR  OF XL DAILY      ondansetron (ZOFRAN) 4 MG tablet Take 1 tablet by mouth daily as needed for Nausea or Vomiting 30 tablet 1    buPROPion (WELLBUTRIN XL) 300 MG XL tablet Take 1 tablet by mouth every morning. 30 tablet 3    albuterol (PROAIR HFA) 108 (90 BASE) MCG/ACT inhaler Inhale 2 puffs into the lungs every 4 hours as needed for Wheezing. 1 Inhaler 5    amphetamine-dextroamphetamine (ADDERALL) 30 MG tablet Take 60 mg by mouth 2 times daily Two 30 mg tabs bid      alprazolam (XANAX) 0.5 MG tablet Take 1 mg by mouth nightly as needed for Sleep Takes three nightly       No current facility-administered medications for this visit. I will continue his current medication regimen  which is part of the above treatment schedule. It has been helping with Mr. Shakeel León chronic  medical problems which for this visit include:   Diagnoses of Chronic pain syndrome, DDD (degenerative disc disease), lumbar, DDD (degenerative disc disease), cervical, Lumbar radiculitis, and Primary osteoarthritis of left knee were pertinent to this visit. Risks and benefits of the medications and other alternative treatments  including no treatment were discussed with the patient. The common side effects of these medications were also explained to the patient. Informed verbal consent was obtained. Goals of current treatment regimen include improvement in pain, restoration of functioning- with focus on improvement in physical performance, general activity, work or disability,emotional distress, health care utilization and  decreased medication consumption. Will continue to monitor progress towards achieving/maintaining therapeutic goals with special emphasis on  1. Improvement in perceived interfernce  of pain with ADL's. Ability to do home exercises independently. Ability to do household chores indoor and/or outdoor work and social and leisure activities. Improve psychosocial and physical functioning. - he is showing progression towards this treatment goal with the current regimen. He was advised against drinking alcohol with the narcotic pain medicines, advised against driving or handling machinery while adjusting the dose of medicines or if having cognitive  issues related to the current medications. Risk of overdose and death, if medicines not taken as prescribed, were also discussed. If the patient develops new symptoms or if the symptoms worsen, the patient should call the office. While transcribing every attempt was made to maintain the accuracy of the note in terms of it's contents,there may have been some errors made inadvertently.   Thank you for

## 2017-10-12 RX ORDER — GABAPENTIN 400 MG/1
400 CAPSULE ORAL 3 TIMES DAILY
Qty: 90 CAPSULE | Refills: 0 | Status: SHIPPED | OUTPATIENT
Start: 2017-10-12 | End: 2017-11-08 | Stop reason: SDUPTHER

## 2017-10-12 NOTE — TELEPHONE ENCOUNTER
Per [address] patient can go back on the Gabapentin 400 mg 3 times daily, was sent to [facility]. Called patient to advise, no answer, left message to call office back.

## 2017-11-08 ENCOUNTER — TELEPHONE (OUTPATIENT)
Dept: FAMILY MEDICINE CLINIC | Age: 57
End: 2017-11-08

## 2017-11-08 DIAGNOSIS — M50.30 DDD (DEGENERATIVE DISC DISEASE), CERVICAL: ICD-10-CM

## 2017-11-08 DIAGNOSIS — M17.12 PRIMARY OSTEOARTHRITIS OF LEFT KNEE: ICD-10-CM

## 2017-11-08 DIAGNOSIS — M54.16 LUMBAR RADICULITIS: ICD-10-CM

## 2017-11-08 DIAGNOSIS — E03.9 ACQUIRED HYPOTHYROIDISM: ICD-10-CM

## 2017-11-08 DIAGNOSIS — M97.11XS PERIPROSTHETIC FRACTURE AROUND INTERNAL PROSTHETIC RIGHT KNEE JOINT, SEQUELA: ICD-10-CM

## 2017-11-08 DIAGNOSIS — M17.10 LOCALIZED OSTEOARTHROSIS, LOWER LEG: ICD-10-CM

## 2017-11-08 DIAGNOSIS — G89.4 CHRONIC PAIN SYNDROME: ICD-10-CM

## 2017-11-08 DIAGNOSIS — J01.90 ACUTE SINUSITIS, RECURRENCE NOT SPECIFIED, UNSPECIFIED LOCATION: ICD-10-CM

## 2017-11-08 DIAGNOSIS — M79.18 MYOFASCIAL PAIN: ICD-10-CM

## 2017-11-08 DIAGNOSIS — M51.36 DDD (DEGENERATIVE DISC DISEASE), LUMBAR: ICD-10-CM

## 2017-11-08 DIAGNOSIS — I10 ESSENTIAL HYPERTENSION: Primary | ICD-10-CM

## 2017-11-08 RX ORDER — AMOXICILLIN 500 MG/1
500 CAPSULE ORAL 3 TIMES DAILY
Qty: 30 CAPSULE | Refills: 0 | Status: SHIPPED | OUTPATIENT
Start: 2017-11-08 | End: 2017-11-09

## 2017-11-08 RX ORDER — AMLODIPINE BESYLATE 10 MG/1
TABLET ORAL
Qty: 30 TABLET | Refills: 1 | Status: SHIPPED | OUTPATIENT
Start: 2017-11-08 | End: 2018-01-11 | Stop reason: DRUGHIGH

## 2017-11-08 RX ORDER — LOSARTAN POTASSIUM 100 MG/1
TABLET ORAL
Qty: 30 TABLET | Refills: 1 | Status: SHIPPED | OUTPATIENT
Start: 2017-11-08 | End: 2018-01-11 | Stop reason: DRUGHIGH

## 2017-11-08 RX ORDER — LOSARTAN POTASSIUM 100 MG/1
TABLET ORAL
Qty: 60 TABLET | Refills: 0 | Status: CANCELLED | OUTPATIENT
Start: 2017-11-08

## 2017-11-08 RX ORDER — LEVOTHYROXINE SODIUM 0.03 MG/1
TABLET ORAL
Qty: 60 TABLET | Refills: 0 | Status: CANCELLED | OUTPATIENT
Start: 2017-11-08

## 2017-11-08 RX ORDER — LEVOTHYROXINE SODIUM 0.03 MG/1
TABLET ORAL
Qty: 30 TABLET | Refills: 1 | Status: SHIPPED | OUTPATIENT
Start: 2017-11-08 | End: 2018-02-05 | Stop reason: SDUPTHER

## 2017-11-08 RX ORDER — AMLODIPINE BESYLATE 10 MG/1
TABLET ORAL
Qty: 60 TABLET | Refills: 0 | Status: CANCELLED | OUTPATIENT
Start: 2017-11-08

## 2017-11-08 NOTE — TELEPHONE ENCOUNTER
Pt called back and stated that he has been a patient here for thirty years and if he cannot get into see Dr Shirin England before she goes, wants to make sure he has at least enough meds to get him through till his appt at [facility] in January. Please advise.

## 2017-11-09 ENCOUNTER — OFFICE VISIT (OUTPATIENT)
Dept: PAIN MANAGEMENT | Age: 57
End: 2017-11-09

## 2017-11-09 VITALS
SYSTOLIC BLOOD PRESSURE: 119 MMHG | WEIGHT: 252.2 LBS | HEART RATE: 67 BPM | DIASTOLIC BLOOD PRESSURE: 78 MMHG | BODY MASS INDEX: 36.19 KG/M2

## 2017-11-09 DIAGNOSIS — M51.36 DDD (DEGENERATIVE DISC DISEASE), LUMBAR: ICD-10-CM

## 2017-11-09 DIAGNOSIS — M79.18 MYOFASCIAL PAIN: ICD-10-CM

## 2017-11-09 DIAGNOSIS — G89.4 CHRONIC PAIN SYNDROME: ICD-10-CM

## 2017-11-09 DIAGNOSIS — M54.16 LUMBAR RADICULITIS: ICD-10-CM

## 2017-11-09 DIAGNOSIS — M17.12 PRIMARY OSTEOARTHRITIS OF LEFT KNEE: ICD-10-CM

## 2017-11-09 DIAGNOSIS — M50.30 DDD (DEGENERATIVE DISC DISEASE), CERVICAL: ICD-10-CM

## 2017-11-09 PROCEDURE — G8427 DOCREV CUR MEDS BY ELIG CLIN: HCPCS | Performed by: INTERNAL MEDICINE

## 2017-11-09 PROCEDURE — G8417 CALC BMI ABV UP PARAM F/U: HCPCS | Performed by: INTERNAL MEDICINE

## 2017-11-09 PROCEDURE — 1036F TOBACCO NON-USER: CPT | Performed by: INTERNAL MEDICINE

## 2017-11-09 PROCEDURE — 99213 OFFICE O/P EST LOW 20 MIN: CPT | Performed by: INTERNAL MEDICINE

## 2017-11-09 PROCEDURE — 3017F COLORECTAL CA SCREEN DOC REV: CPT | Performed by: INTERNAL MEDICINE

## 2017-11-09 PROCEDURE — G8484 FLU IMMUNIZE NO ADMIN: HCPCS | Performed by: INTERNAL MEDICINE

## 2017-11-09 RX ORDER — LUBIPROSTONE 24 UG/1
CAPSULE, GELATIN COATED ORAL
Qty: 60 CAPSULE | Refills: 1 | Status: SHIPPED | OUTPATIENT
Start: 2017-11-09 | End: 2017-12-15 | Stop reason: SDUPTHER

## 2017-11-09 RX ORDER — OXYCODONE AND ACETAMINOPHEN 7.5; 325 MG/1; MG/1
TABLET ORAL
Qty: 112 TABLET | Refills: 0 | OUTPATIENT
Start: 2017-11-09

## 2017-11-09 RX ORDER — OXYCODONE AND ACETAMINOPHEN 7.5; 325 MG/1; MG/1
1 TABLET ORAL EVERY 6 HOURS PRN
Qty: 140 TABLET | Refills: 0 | Status: SHIPPED | OUTPATIENT
Start: 2017-11-09 | End: 2017-12-15 | Stop reason: SDUPTHER

## 2017-11-09 RX ORDER — GABAPENTIN 400 MG/1
400 CAPSULE ORAL 3 TIMES DAILY
Qty: 90 CAPSULE | Refills: 1 | Status: SHIPPED | OUTPATIENT
Start: 2017-11-09 | End: 2017-12-15 | Stop reason: SDUPTHER

## 2017-11-09 RX ORDER — DULOXETIN HYDROCHLORIDE 60 MG/1
CAPSULE, DELAYED RELEASE ORAL
Qty: 30 CAPSULE | Refills: 1 | Status: SHIPPED | OUTPATIENT
Start: 2017-11-09 | End: 2017-12-15 | Stop reason: SDUPTHER

## 2017-11-09 RX ORDER — GABAPENTIN 400 MG/1
400 CAPSULE ORAL 3 TIMES DAILY
Qty: 90 CAPSULE | Refills: 0 | Status: SHIPPED | OUTPATIENT
Start: 2017-11-09 | End: 2017-11-09 | Stop reason: SDUPTHER

## 2017-11-09 NOTE — PROGRESS NOTES
ALLERGIES: Patients list of allergies were reviewed     MEDICATIONS: Mr. Kelly Anderson list of medications were reviewed. His current medications are   Outpatient Medications Prior to Visit   Medication Sig Dispense Refill    gabapentin (NEURONTIN) 400 MG capsule Take 1 capsule by mouth 3 times daily 90 capsule 0    amLODIPine (NORVASC) 10 MG tablet TAKE 1 TABLET BY MOUTH EVERY DAY 30 tablet 1    levothyroxine (SYNTHROID) 25 MCG tablet TAKE 1 TABLET BY MOUTH EVERY DAY 30 tablet 1    losartan (COZAAR) 100 MG tablet TAKE 1 TABLET BY MOUTH EVERY DAY 30 tablet 1    DULoxetine (CYMBALTA) 60 MG extended release capsule TAKE ONE CAPSULE BY MOUTH EVERY DAY 30 capsule 0    oxyCODONE-acetaminophen (PERCOCET) 7.5-325 MG per tablet Take 1 tablet by mouth every 6 hours as needed for Pain  Max 4 per day. 112 tablet 0    AMITIZA 24 MCG capsule Take 1 capsule by mouth 2 times daily (with meals) 60 capsule 0    metoprolol (LOPRESSOR) 100 MG tablet Take 1 tablet by mouth daily 90 tablet 1    metoprolol succinate (TOPROL XL) 50 MG extended release tablet Take 1 tablet by mouth daily 30 tablet 5    buPROPion (WELLBUTRIN SR) 150 MG extended release tablet Take 150 mg by mouth daily PT STATES TAKES 150 OF SR  OF XL DAILY      ondansetron (ZOFRAN) 4 MG tablet Take 1 tablet by mouth daily as needed for Nausea or Vomiting 30 tablet 1    buPROPion (WELLBUTRIN XL) 300 MG XL tablet Take 1 tablet by mouth every morning. 30 tablet 3    albuterol (PROAIR HFA) 108 (90 BASE) MCG/ACT inhaler Inhale 2 puffs into the lungs every 4 hours as needed for Wheezing.  1 Inhaler 5    amphetamine-dextroamphetamine (ADDERALL) 30 MG tablet Take 60 mg by mouth 2 times daily Two 30 mg tabs bid      alprazolam (XANAX) 0.5 MG tablet Take 1 mg by mouth nightly as needed for Sleep Takes three nightly      amoxicillin (AMOXIL) 500 MG capsule Take 1 capsule by mouth 3 times daily for 10 days 30 capsule 0    pregabalin (LYRICA) 75 MG capsule Take 1 Biofeedback, relaxation and meditation techniques. Referral to psychologist for CBT was also discussed with patient    Current Outpatient Prescriptions   Medication Sig Dispense Refill    gabapentin (NEURONTIN) 400 MG capsule Take 1 capsule by mouth 3 times daily 90 capsule 0    amLODIPine (NORVASC) 10 MG tablet TAKE 1 TABLET BY MOUTH EVERY DAY 30 tablet 1    levothyroxine (SYNTHROID) 25 MCG tablet TAKE 1 TABLET BY MOUTH EVERY DAY 30 tablet 1    losartan (COZAAR) 100 MG tablet TAKE 1 TABLET BY MOUTH EVERY DAY 30 tablet 1    DULoxetine (CYMBALTA) 60 MG extended release capsule TAKE ONE CAPSULE BY MOUTH EVERY DAY 30 capsule 0    oxyCODONE-acetaminophen (PERCOCET) 7.5-325 MG per tablet Take 1 tablet by mouth every 6 hours as needed for Pain  Max 4 per day. 112 tablet 0    AMITIZA 24 MCG capsule Take 1 capsule by mouth 2 times daily (with meals) 60 capsule 0    metoprolol (LOPRESSOR) 100 MG tablet Take 1 tablet by mouth daily 90 tablet 1    metoprolol succinate (TOPROL XL) 50 MG extended release tablet Take 1 tablet by mouth daily 30 tablet 5    buPROPion (WELLBUTRIN SR) 150 MG extended release tablet Take 150 mg by mouth daily PT STATES TAKES 150 OF SR  OF XL DAILY      ondansetron (ZOFRAN) 4 MG tablet Take 1 tablet by mouth daily as needed for Nausea or Vomiting 30 tablet 1    buPROPion (WELLBUTRIN XL) 300 MG XL tablet Take 1 tablet by mouth every morning. 30 tablet 3    albuterol (PROAIR HFA) 108 (90 BASE) MCG/ACT inhaler Inhale 2 puffs into the lungs every 4 hours as needed for Wheezing. 1 Inhaler 5    amphetamine-dextroamphetamine (ADDERALL) 30 MG tablet Take 60 mg by mouth 2 times daily Two 30 mg tabs bid      alprazolam (XANAX) 0.5 MG tablet Take 1 mg by mouth nightly as needed for Sleep Takes three nightly       No current facility-administered medications for this visit. I will continue his current medication regimen  which is part of the above treatment schedule.  It has been helping with Mr. 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 Risks and benefits of the medications and other alternative treatments  including no treatment were discussed with the patient. The common side effects of these medications were also explained to the patient. Informed verbal consent was obtained. Goals of current treatment regimen include improvement in pain, restoration of functioning- with focus on improvement in physical performance, general activity, work or disability,emotional distress, health care utilization and  decreased medication consumption. Will continue to monitor progress towards achieving/maintaining therapeutic goals with special emphasis on  1. Improvement in perceived interfernce  of pain with ADL's. Ability to do home exercises independently. Ability to do household chores indoor and/or outdoor work and social and leisure activities. Improve psychosocial and physical functioning. - he is showing progression towards this treatment goal with the current regimen. He was advised against drinking alcohol with the narcotic pain medicines, advised against driving or handling machinery while adjusting the dose of medicines or if having cognitive  issues related to the current medications. Risk of overdose and death, if medicines not taken as prescribed, were also discussed. If the patient develops new symptoms or if the symptoms worsen, the patient should call the office. While transcribing every attempt was made to maintain the accuracy of the note in terms of it's contents,there may have been some errors made inadvertently.   Thank you for allowing me to participate

## 2017-12-15 ENCOUNTER — OFFICE VISIT (OUTPATIENT)
Dept: PAIN MANAGEMENT | Age: 57
End: 2017-12-15

## 2017-12-15 VITALS
WEIGHT: 245.4 LBS | DIASTOLIC BLOOD PRESSURE: 96 MMHG | BODY MASS INDEX: 35.21 KG/M2 | SYSTOLIC BLOOD PRESSURE: 142 MMHG | HEART RATE: 72 BPM

## 2017-12-15 DIAGNOSIS — M17.12 PRIMARY OSTEOARTHRITIS OF LEFT KNEE: ICD-10-CM

## 2017-12-15 DIAGNOSIS — M79.18 MYOFASCIAL PAIN: ICD-10-CM

## 2017-12-15 DIAGNOSIS — G89.4 CHRONIC PAIN SYNDROME: ICD-10-CM

## 2017-12-15 DIAGNOSIS — M51.36 DDD (DEGENERATIVE DISC DISEASE), LUMBAR: ICD-10-CM

## 2017-12-15 DIAGNOSIS — M54.16 LUMBAR RADICULITIS: ICD-10-CM

## 2017-12-15 DIAGNOSIS — R10.84 GENERALIZED ABDOMINAL PAIN: ICD-10-CM

## 2017-12-15 DIAGNOSIS — M50.30 DDD (DEGENERATIVE DISC DISEASE), CERVICAL: ICD-10-CM

## 2017-12-15 PROCEDURE — G8428 CUR MEDS NOT DOCUMENT: HCPCS | Performed by: INTERNAL MEDICINE

## 2017-12-15 PROCEDURE — 3017F COLORECTAL CA SCREEN DOC REV: CPT | Performed by: INTERNAL MEDICINE

## 2017-12-15 PROCEDURE — 1036F TOBACCO NON-USER: CPT | Performed by: INTERNAL MEDICINE

## 2017-12-15 PROCEDURE — G8484 FLU IMMUNIZE NO ADMIN: HCPCS | Performed by: INTERNAL MEDICINE

## 2017-12-15 PROCEDURE — G8417 CALC BMI ABV UP PARAM F/U: HCPCS | Performed by: INTERNAL MEDICINE

## 2017-12-15 PROCEDURE — 99213 OFFICE O/P EST LOW 20 MIN: CPT | Performed by: INTERNAL MEDICINE

## 2017-12-15 RX ORDER — LUBIPROSTONE 24 UG/1
CAPSULE, GELATIN COATED ORAL
Qty: 60 CAPSULE | Refills: 2 | Status: SHIPPED | OUTPATIENT
Start: 2017-12-15 | End: 2018-02-06 | Stop reason: SDUPTHER

## 2017-12-15 RX ORDER — GABAPENTIN 400 MG/1
400 CAPSULE ORAL 3 TIMES DAILY
Qty: 90 CAPSULE | Refills: 2 | Status: SHIPPED | OUTPATIENT
Start: 2017-12-15 | End: 2018-02-06 | Stop reason: SDUPTHER

## 2017-12-15 RX ORDER — OXYCODONE AND ACETAMINOPHEN 7.5; 325 MG/1; MG/1
1 TABLET ORAL EVERY 6 HOURS PRN
Qty: 168 TABLET | Refills: 0 | Status: SHIPPED | OUTPATIENT
Start: 2017-12-15 | End: 2018-02-06 | Stop reason: SDUPTHER

## 2017-12-15 RX ORDER — DULOXETIN HYDROCHLORIDE 60 MG/1
CAPSULE, DELAYED RELEASE ORAL
Qty: 30 CAPSULE | Refills: 2 | Status: SHIPPED | OUTPATIENT
Start: 2017-12-15 | End: 2018-02-06 | Stop reason: SDUPTHER

## 2017-12-15 NOTE — PROGRESS NOTES
Estefany Diallo  1960      HISTORY OF PRESENT ILLNESS:  Mr. Kelly Anderson is a 62 y.o. male returns for a follow up visit for multiple medical problems. His current presenting problems are   1. Chronic pain syndrome    2. DDD (degenerative disc disease), lumbar    3. DDD (degenerative disc disease), cervical    4. Lumbar radiculitis    5. Generalized abdominal pain    6. Primary osteoarthritis of left knee    7. Myofascial pain    . As per information/history obtained from the PADT(patient assessment and documentation tool) - He complains of pain in the head, neck and lower back with radiation to the shoulders Right, hands Left, lower back, buttocks, hips Bilateral, upper leg Left, ankles Right and feet Right He rates the pain 5/10 and describes it as sharp, aching. Pain is made worse by: movement, walking, standing, sitting, bending, lifting. Current treatment regimen has helped relieve about 60% of the pain. He denies side effects from the current pain regimen. Patient reports that since the last follow up visit the physical functioning is unchanged, family/social relationships are unchanged, mood is better and sleep patterns are better, and that the overall functioning is unchanged. Patient denies neurological bowel or bladder. Patient denies misusing/abusing his narcotic pain medications or using any illegal drugs. There are No indicators for possible drug abuse, addiction or diversion problems. Upon obtaining the medical history from Mr. Kelly Anderson regarding today's office visit for his presenting problems, Patient states he was using Trazodone from his Psych but quit using it. He denies any constipation symptoms, using Amitiza PRN. Patient's  subjective report of his mood is fair. he describes occasional symptoms of depression, occasional  irritability and some mood swings. Describes his mood as being neutral and reports some pleasure in his daily activities.  Reports  fair  appetite, energy Inhaler 5    amphetamine-dextroamphetamine (ADDERALL) 30 MG tablet Take 60 mg by mouth 2 times daily Two 30 mg tabs bid      alprazolam (XANAX) 0.5 MG tablet Take 1 mg by mouth nightly as needed for Sleep Takes three nightly       No facility-administered medications prior to visit. SOCIAL/FAMILY/PAST MEDICAL HISTORY: Mr. Jessica Sorto, family and past medical history was reviewed. REVIEW OF SYSTEMS:    Respiratory: Negative for apnea, chest tightness and shortness of breath or change in baseline breathing. Gastrointestinal: Negative for nausea, vomiting, abdominal pain, diarrhea, constipation, blood in stool and abdominal distention. PHYSICAL EXAM:   Nursing note and vitals reviewed. BP (!) 142/96   Pulse 72   Wt 245 lb 6.4 oz (111.3 kg)   BMI 35.21 kg/m²   Constitutional: He appears well-developed and well-nourished. No acute distress. Skin: Skin is warm and dry, good turgor. No rash noted. He is not diaphoretic. Cardiovascular: Normal rate, regular rhythm, normal heart sounds, and does not have murmur. Pulmonary/Chest: Effort normal. No respiratory distress. He does not have wheezes in the lung fields. He has no rales. Neurological/Psychiatric:He is alert and oriented to person, place, and time. Coordination is  normal. His mood isAppropriate and affect is Flat/blunted . IMPRESSION:   1. Chronic pain syndrome    2. DDD (degenerative disc disease), lumbar    3. DDD (degenerative disc disease), cervical    4. Lumbar radiculitis    5. Generalized abdominal pain    6. Primary osteoarthritis of left knee    7. Myofascial pain        PLAN:  Informed verbal consent was obtained  -Continue with current regimen   -Advised to continue with Cymbalta and Neurontin   -Adv Biofeedback, relaxation and meditation techniques.  Referral to psychologist for CBT was also discussed with patient  -He was advised weight reduction, diet changes- 800-1200 fabi diet, diet diary, exercising,

## 2018-01-11 ENCOUNTER — OFFICE VISIT (OUTPATIENT)
Dept: FAMILY MEDICINE CLINIC | Age: 58
End: 2018-01-11

## 2018-01-11 VITALS
DIASTOLIC BLOOD PRESSURE: 64 MMHG | BODY MASS INDEX: 34.65 KG/M2 | HEART RATE: 66 BPM | SYSTOLIC BLOOD PRESSURE: 118 MMHG | TEMPERATURE: 98.5 F | OXYGEN SATURATION: 96 % | HEIGHT: 70 IN | WEIGHT: 242 LBS

## 2018-01-11 DIAGNOSIS — G89.4 CHRONIC PAIN SYNDROME: ICD-10-CM

## 2018-01-11 DIAGNOSIS — I10 ESSENTIAL HYPERTENSION: ICD-10-CM

## 2018-01-11 DIAGNOSIS — E03.9 ACQUIRED HYPOTHYROIDISM: ICD-10-CM

## 2018-01-11 DIAGNOSIS — F33.9 RECURRENT MAJOR DEPRESSIVE DISORDER, REMISSION STATUS UNSPECIFIED (HCC): Primary | Chronic | ICD-10-CM

## 2018-01-11 DIAGNOSIS — F90.9 ATTENTION DEFICIT HYPERACTIVITY DISORDER (ADHD), UNSPECIFIED ADHD TYPE: ICD-10-CM

## 2018-01-11 PROCEDURE — G8484 FLU IMMUNIZE NO ADMIN: HCPCS | Performed by: NURSE PRACTITIONER

## 2018-01-11 PROCEDURE — 3017F COLORECTAL CA SCREEN DOC REV: CPT | Performed by: NURSE PRACTITIONER

## 2018-01-11 PROCEDURE — 1036F TOBACCO NON-USER: CPT | Performed by: NURSE PRACTITIONER

## 2018-01-11 PROCEDURE — G8417 CALC BMI ABV UP PARAM F/U: HCPCS | Performed by: NURSE PRACTITIONER

## 2018-01-11 PROCEDURE — G8427 DOCREV CUR MEDS BY ELIG CLIN: HCPCS | Performed by: NURSE PRACTITIONER

## 2018-01-11 PROCEDURE — 99214 OFFICE O/P EST MOD 30 MIN: CPT | Performed by: NURSE PRACTITIONER

## 2018-01-11 RX ORDER — LOSARTAN POTASSIUM 100 MG/1
TABLET ORAL
Qty: 30 TABLET | Refills: 3 | Status: SHIPPED | OUTPATIENT
Start: 2018-01-11 | End: 2018-02-05 | Stop reason: SDUPTHER

## 2018-01-11 RX ORDER — AMLODIPINE BESYLATE 10 MG/1
TABLET ORAL
Qty: 30 TABLET | Refills: 3 | Status: SHIPPED | OUTPATIENT
Start: 2018-01-11 | End: 2018-02-05 | Stop reason: SDUPTHER

## 2018-01-11 ASSESSMENT — ENCOUNTER SYMPTOMS
SHORTNESS OF BREATH: 0
SORE THROAT: 0
COUGH: 0

## 2018-01-11 NOTE — PROGRESS NOTES
Patient: Carrie Vazquez is a 62 y.o. male who presents today with the following Chief Complaint(s):  Chief Complaint   Patient presents with    Establish Care         HPI   Patient is a 61 yo male who is here to establish care. He and his wife go to [place] a few months at a time. He flew up here for this appt. HTN: stable on current medications  Hypothyroid: has been stable on current medication  Chronic pain: sees Dr. Luis A Montoya for Amitiza, cymbalta, gabapentin, and percocet  Depression: sees Dr. Favio Villalobos for wellbutrin, xanax and adderall    Current Outpatient Prescriptions   Medication Sig Dispense Refill    amLODIPine (NORVASC) 10 MG tablet TAKE 1 TABLET BY MOUTH EVERY DAY 30 tablet 3    losartan (COZAAR) 100 MG tablet TAKE 1 TABLET BY MOUTH EVERY DAY 30 tablet 3    oxyCODONE-acetaminophen (PERCOCET) 7.5-325 MG per tablet Take 1 tablet by mouth every 6 hours as needed for Pain  Max 3-4 per day. 168 tablet 0    lubiprostone (AMITIZA) 24 MCG capsule Take 1 capsule by mouth 2 times daily (with meals) 60 capsule 2    DULoxetine (CYMBALTA) 60 MG extended release capsule TAKE ONE CAPSULE BY MOUTH EVERY DAY 30 capsule 2    gabapentin (NEURONTIN) 400 MG capsule Take 1 capsule by mouth 3 times daily 90 capsule 2    levothyroxine (SYNTHROID) 25 MCG tablet TAKE 1 TABLET BY MOUTH EVERY DAY 30 tablet 1    buPROPion (WELLBUTRIN SR) 150 MG extended release tablet Take 150 mg by mouth daily PT STATES TAKES 150 OF SR  OF XL DAILY      buPROPion (WELLBUTRIN XL) 300 MG XL tablet Take 1 tablet by mouth every morning.  30 tablet 3    amphetamine-dextroamphetamine (ADDERALL) 30 MG tablet Take 60 mg by mouth 2 times daily Two 30 mg tabs bid      alprazolam (XANAX) 0.5 MG tablet Take 1 mg by mouth nightly as needed for Sleep Takes three nightly      ondansetron (ZOFRAN) 4 MG tablet Take 1 tablet by mouth daily as needed for Nausea or Vomiting 30 tablet 1    albuterol (PROAIR HFA) 108 (90 BASE) MCG/ACT inhaler Inhale 2 puffs into the lungs every 4 hours as needed for Wheezing. 1 Inhaler 5     No current facility-administered medications for this visit. Patient's past medical history, surgical history, family history, medications,  and allergies  were all reviewed and updated as appropriate today. Review of Systems   Constitutional: Negative for chills and fever. HENT: Negative for congestion and sore throat. Respiratory: Negative for cough and shortness of breath. Cardiovascular: Negative for chest pain, palpitations and leg swelling. Musculoskeletal: Negative for myalgias. Back pain: chronic. Neurological: Negative for dizziness and headaches. Psychiatric/Behavioral: Negative for substance abuse and suicidal ideas. Depression: stable on current meds. The patient is not nervous/anxious. Physical Exam   Constitutional: He is oriented to person, place, and time. He appears well-developed and well-nourished. HENT:   Head: Normocephalic and atraumatic. Eyes: Conjunctivae are normal.   Cardiovascular: Normal rate, regular rhythm and normal heart sounds. No murmur heard. Pulmonary/Chest: Effort normal and breath sounds normal. No respiratory distress. He has no wheezes. He has no rales. Neurological: He is alert and oriented to person, place, and time. Skin: Skin is warm and dry. Psychiatric: He has a normal mood and affect. His behavior is normal. Judgment and thought content normal.   Nursing note and vitals reviewed. Vitals:    01/11/18 0918   BP: 118/64   Pulse: 66   Temp: 98.5 °F (36.9 °C)   SpO2: 96%       Assessment:  Encounter Diagnoses   Name Primary?  Essential hypertension     Recurrent major depressive disorder, remission status unspecified ([facility] Yes    Attention deficit hyperactivity disorder (ADHD), unspecified ADHD type     Chronic pain syndrome     Acquired hypothyroidism        Plan:  1.  Essential hypertension    - amLODIPine (NORVASC) 10 MG tablet; TAKE 1 TABLET BY MOUTH EVERY DAY  Dispense: 30 tablet; Refill: 3  - losartan (COZAAR) 100 MG tablet; TAKE 1 TABLET BY MOUTH EVERY DAY  Dispense: 30 tablet; Refill: 3    2. Recurrent major depressive disorder, remission status unspecified ([facility]  Will continue seeing Dr. Laura Powell    3. Attention deficit hyperactivity disorder (ADHD), unspecified ADHD type  Will continue seeing Dr. Laura Powell    4. Chronic pain syndrome  Will continue seeing Dr. Sadaf Olmedo    5.  Acquired hypothyroidism      Continue current meds    Patient will schedule physical with  in a few months

## 2018-02-05 DIAGNOSIS — E03.9 ACQUIRED HYPOTHYROIDISM: ICD-10-CM

## 2018-02-05 DIAGNOSIS — I10 ESSENTIAL HYPERTENSION: ICD-10-CM

## 2018-02-05 RX ORDER — LEVOTHYROXINE SODIUM 0.03 MG/1
TABLET ORAL
Qty: 30 TABLET | Refills: 1 | Status: SHIPPED | OUTPATIENT
Start: 2018-02-05 | End: 2018-04-02 | Stop reason: SDUPTHER

## 2018-02-05 RX ORDER — AMLODIPINE BESYLATE 10 MG/1
TABLET ORAL
Qty: 30 TABLET | Refills: 3 | Status: SHIPPED | OUTPATIENT
Start: 2018-02-05 | End: 2018-06-06 | Stop reason: SDUPTHER

## 2018-02-05 RX ORDER — LOSARTAN POTASSIUM 100 MG/1
TABLET ORAL
Qty: 30 TABLET | Refills: 3 | Status: SHIPPED | OUTPATIENT
Start: 2018-02-05 | End: 2018-06-06 | Stop reason: SDUPTHER

## 2018-02-06 ENCOUNTER — OFFICE VISIT (OUTPATIENT)
Dept: PAIN MANAGEMENT | Age: 58
End: 2018-02-06

## 2018-02-06 VITALS
SYSTOLIC BLOOD PRESSURE: 139 MMHG | HEART RATE: 74 BPM | DIASTOLIC BLOOD PRESSURE: 84 MMHG | WEIGHT: 240 LBS | BODY MASS INDEX: 34.44 KG/M2

## 2018-02-06 DIAGNOSIS — M79.18 MYOFASCIAL PAIN: ICD-10-CM

## 2018-02-06 DIAGNOSIS — M50.30 DDD (DEGENERATIVE DISC DISEASE), CERVICAL: ICD-10-CM

## 2018-02-06 DIAGNOSIS — M54.16 LUMBAR RADICULITIS: ICD-10-CM

## 2018-02-06 DIAGNOSIS — M51.36 DDD (DEGENERATIVE DISC DISEASE), LUMBAR: ICD-10-CM

## 2018-02-06 DIAGNOSIS — M17.12 PRIMARY OSTEOARTHRITIS OF LEFT KNEE: ICD-10-CM

## 2018-02-06 DIAGNOSIS — G89.4 CHRONIC PAIN SYNDROME: ICD-10-CM

## 2018-02-06 PROCEDURE — 99213 OFFICE O/P EST LOW 20 MIN: CPT | Performed by: INTERNAL MEDICINE

## 2018-02-06 PROCEDURE — G8417 CALC BMI ABV UP PARAM F/U: HCPCS | Performed by: INTERNAL MEDICINE

## 2018-02-06 PROCEDURE — G8484 FLU IMMUNIZE NO ADMIN: HCPCS | Performed by: INTERNAL MEDICINE

## 2018-02-06 PROCEDURE — 3017F COLORECTAL CA SCREEN DOC REV: CPT | Performed by: INTERNAL MEDICINE

## 2018-02-06 PROCEDURE — 1036F TOBACCO NON-USER: CPT | Performed by: INTERNAL MEDICINE

## 2018-02-06 PROCEDURE — G8427 DOCREV CUR MEDS BY ELIG CLIN: HCPCS | Performed by: INTERNAL MEDICINE

## 2018-02-06 RX ORDER — LUBIPROSTONE 24 UG/1
CAPSULE, GELATIN COATED ORAL
Qty: 60 CAPSULE | Refills: 0 | Status: SHIPPED | OUTPATIENT
Start: 2018-02-06 | End: 2018-03-20 | Stop reason: SDUPTHER

## 2018-02-06 RX ORDER — OXYCODONE AND ACETAMINOPHEN 7.5; 325 MG/1; MG/1
1 TABLET ORAL EVERY 6 HOURS PRN
Qty: 112 TABLET | Refills: 0 | Status: SHIPPED | OUTPATIENT
Start: 2018-02-06 | End: 2018-03-20 | Stop reason: SDUPTHER

## 2018-02-06 RX ORDER — GABAPENTIN 400 MG/1
400 CAPSULE ORAL 2 TIMES DAILY
Qty: 60 CAPSULE | Refills: 0 | Status: SHIPPED | OUTPATIENT
Start: 2018-02-06 | End: 2018-03-20

## 2018-02-06 RX ORDER — DULOXETIN HYDROCHLORIDE 60 MG/1
CAPSULE, DELAYED RELEASE ORAL
Qty: 30 CAPSULE | Refills: 0 | Status: SHIPPED | OUTPATIENT
Start: 2018-02-06 | End: 2018-03-20 | Stop reason: SDUPTHER

## 2018-02-06 NOTE — PROGRESS NOTES
pleasure in his daily activities. Reports  fair  appetite, energy and concentration. Able to function well in different aspects of his daily activities. Denies suicidal or homicidal ideation. Denies any complaints of increased tension, does   Worry sometimes and occasional  irritability  he denies any c/o increased anxiety, No c/o panic attacks or symptoms of PTSD. ALLERGIES: Patients list of allergies were reviewed     MEDICATIONS: Mr. Zayda Rosa list of medications were reviewed. His current medications are   Outpatient Medications Prior to Visit   Medication Sig Dispense Refill    amLODIPine (NORVASC) 10 MG tablet TAKE 1 TABLET BY MOUTH EVERY DAY 30 tablet 3    losartan (COZAAR) 100 MG tablet TAKE 1 TABLET BY MOUTH EVERY DAY 30 tablet 3    levothyroxine (SYNTHROID) 25 MCG tablet TAKE 1 TABLET BY MOUTH EVERY DAY 30 tablet 1    oxyCODONE-acetaminophen (PERCOCET) 7.5-325 MG per tablet Take 1 tablet by mouth every 6 hours as needed for Pain  Max 3-4 per day. 168 tablet 0    lubiprostone (AMITIZA) 24 MCG capsule Take 1 capsule by mouth 2 times daily (with meals) 60 capsule 2    DULoxetine (CYMBALTA) 60 MG extended release capsule TAKE ONE CAPSULE BY MOUTH EVERY DAY 30 capsule 2    gabapentin (NEURONTIN) 400 MG capsule Take 1 capsule by mouth 3 times daily 90 capsule 2    buPROPion (WELLBUTRIN SR) 150 MG extended release tablet Take 150 mg by mouth daily PT STATES TAKES 150 OF SR  OF XL DAILY      ondansetron (ZOFRAN) 4 MG tablet Take 1 tablet by mouth daily as needed for Nausea or Vomiting 30 tablet 1    buPROPion (WELLBUTRIN XL) 300 MG XL tablet Take 1 tablet by mouth every morning. 30 tablet 3    albuterol (PROAIR HFA) 108 (90 BASE) MCG/ACT inhaler Inhale 2 puffs into the lungs every 4 hours as needed for Wheezing.  1 Inhaler 5    amphetamine-dextroamphetamine (ADDERALL) 30 MG tablet Take 60 mg by mouth 2 times daily Two 30 mg tabs bid      alprazolam (XANAX) 0.5 MG tablet Take 1 mg by mouth up on results. Current Outpatient Prescriptions   Medication Sig Dispense Refill    amLODIPine (NORVASC) 10 MG tablet TAKE 1 TABLET BY MOUTH EVERY DAY 30 tablet 3    losartan (COZAAR) 100 MG tablet TAKE 1 TABLET BY MOUTH EVERY DAY 30 tablet 3    levothyroxine (SYNTHROID) 25 MCG tablet TAKE 1 TABLET BY MOUTH EVERY DAY 30 tablet 1    oxyCODONE-acetaminophen (PERCOCET) 7.5-325 MG per tablet Take 1 tablet by mouth every 6 hours as needed for Pain  Max 3-4 per day. 168 tablet 0    lubiprostone (AMITIZA) 24 MCG capsule Take 1 capsule by mouth 2 times daily (with meals) 60 capsule 2    DULoxetine (CYMBALTA) 60 MG extended release capsule TAKE ONE CAPSULE BY MOUTH EVERY DAY 30 capsule 2    gabapentin (NEURONTIN) 400 MG capsule Take 1 capsule by mouth 3 times daily 90 capsule 2    buPROPion (WELLBUTRIN SR) 150 MG extended release tablet Take 150 mg by mouth daily PT STATES TAKES 150 OF SR  OF XL DAILY      ondansetron (ZOFRAN) 4 MG tablet Take 1 tablet by mouth daily as needed for Nausea or Vomiting 30 tablet 1    buPROPion (WELLBUTRIN XL) 300 MG XL tablet Take 1 tablet by mouth every morning. 30 tablet 3    albuterol (PROAIR HFA) 108 (90 BASE) MCG/ACT inhaler Inhale 2 puffs into the lungs every 4 hours as needed for Wheezing. 1 Inhaler 5    amphetamine-dextroamphetamine (ADDERALL) 30 MG tablet Take 60 mg by mouth 2 times daily Two 30 mg tabs bid      alprazolam (XANAX) 0.5 MG tablet Take 1 mg by mouth nightly as needed for Sleep Takes three nightly       No current facility-administered medications for this visit. I will continue his current medication regimen  which is part of the above treatment schedule.  It has been helping with Mr. Sharif Cmumins chronic  medical problems which for this visit include:   Diagnoses of Chronic pain syndrome, DDD (degenerative disc disease), lumbar, DDD (degenerative disc disease), cervical, Lumbar radiculitis, Primary osteoarthritis of left knee, and Myofascial

## 2018-03-06 ENCOUNTER — OFFICE VISIT (OUTPATIENT)
Dept: FAMILY MEDICINE CLINIC | Age: 58
End: 2018-03-06

## 2018-03-06 VITALS
WEIGHT: 240 LBS | SYSTOLIC BLOOD PRESSURE: 138 MMHG | DIASTOLIC BLOOD PRESSURE: 88 MMHG | OXYGEN SATURATION: 95 % | HEART RATE: 82 BPM | BODY MASS INDEX: 34.44 KG/M2 | TEMPERATURE: 97.9 F

## 2018-03-06 DIAGNOSIS — R51.9 ACUTE NONINTRACTABLE HEADACHE, UNSPECIFIED HEADACHE TYPE: Primary | ICD-10-CM

## 2018-03-06 DIAGNOSIS — B02.9 HERPES ZOSTER WITHOUT COMPLICATION: ICD-10-CM

## 2018-03-06 DIAGNOSIS — Z93.3 COLOSTOMY PRESENT (HCC): ICD-10-CM

## 2018-03-06 DIAGNOSIS — R39.89 BLADDER PAIN: ICD-10-CM

## 2018-03-06 DIAGNOSIS — K43.9 HERNIA OF ABDOMINAL WALL: ICD-10-CM

## 2018-03-06 PROCEDURE — G8417 CALC BMI ABV UP PARAM F/U: HCPCS | Performed by: NURSE PRACTITIONER

## 2018-03-06 PROCEDURE — 3017F COLORECTAL CA SCREEN DOC REV: CPT | Performed by: NURSE PRACTITIONER

## 2018-03-06 PROCEDURE — G8484 FLU IMMUNIZE NO ADMIN: HCPCS | Performed by: NURSE PRACTITIONER

## 2018-03-06 PROCEDURE — G8427 DOCREV CUR MEDS BY ELIG CLIN: HCPCS | Performed by: NURSE PRACTITIONER

## 2018-03-06 PROCEDURE — 1036F TOBACCO NON-USER: CPT | Performed by: NURSE PRACTITIONER

## 2018-03-06 PROCEDURE — 99214 OFFICE O/P EST MOD 30 MIN: CPT | Performed by: NURSE PRACTITIONER

## 2018-03-06 PROCEDURE — 96372 THER/PROPH/DIAG INJ SC/IM: CPT | Performed by: NURSE PRACTITIONER

## 2018-03-06 RX ORDER — VALACYCLOVIR HYDROCHLORIDE 1 G/1
1000 TABLET, FILM COATED ORAL 3 TIMES DAILY
Qty: 21 TABLET | Refills: 0 | Status: SHIPPED | OUTPATIENT
Start: 2018-03-06 | End: 2018-03-13

## 2018-03-06 RX ORDER — IBUPROFEN 800 MG/1
800 TABLET ORAL EVERY 6 HOURS PRN
Qty: 120 TABLET | Refills: 3 | Status: SHIPPED | OUTPATIENT
Start: 2018-03-06 | End: 2018-07-06 | Stop reason: SDUPTHER

## 2018-03-06 ASSESSMENT — ENCOUNTER SYMPTOMS: ABDOMINAL PAIN: 1

## 2018-03-06 NOTE — PROGRESS NOTES
TAKES 150 OF SR  OF XL DAILY      ondansetron (ZOFRAN) 4 MG tablet Take 1 tablet by mouth daily as needed for Nausea or Vomiting 30 tablet 1    buPROPion (WELLBUTRIN XL) 300 MG XL tablet Take 1 tablet by mouth every morning. 30 tablet 3    albuterol (PROAIR HFA) 108 (90 BASE) MCG/ACT inhaler Inhale 2 puffs into the lungs every 4 hours as needed for Wheezing. 1 Inhaler 5    amphetamine-dextroamphetamine (ADDERALL) 30 MG tablet Take 60 mg by mouth 2 times daily Two 30 mg tabs bid      alprazolam (XANAX) 0.5 MG tablet Take 1 mg by mouth nightly as needed for Sleep Takes three nightly       No current facility-administered medications for this visit. Patient's past medical history, surgical history, family history, medications,  and allergies  were all reviewed and updated as appropriate today. Review of Systems   Gastrointestinal: Positive for abdominal pain (pain in upper abdomen when coughing or lifting something heavy). Genitourinary:        \"bladder pain\"   Skin: Positive for itching and rash (under left arm/side). Physical Exam   Constitutional: He is oriented to person, place, and time. He appears well-developed and well-nourished. HENT:   Head: Normocephalic and atraumatic. Eyes: Conjunctivae are normal.   Cardiovascular: Normal rate, regular rhythm and normal heart sounds. No murmur heard. Pulmonary/Chest: Effort normal and breath sounds normal. No respiratory distress. He has no wheezes. He has no rales. Abdominal: Soft. Bowel sounds are normal. He exhibits no distension. There is no rebound. Hernia felt when patient sat up/pushed his abdomen out in right upper abdominal area- soft/reducible   Neurological: He is alert and oriented to person, place, and time. Skin: Skin is warm and dry. Rash noted. 3 small areas of fluid filled blisters on left upper side of trunk/under left arm   Psychiatric: He has a normal mood and affect.  His behavior is normal.

## 2018-03-15 ENCOUNTER — TELEPHONE (OUTPATIENT)
Dept: SURGERY | Age: 58
End: 2018-03-15

## 2018-03-15 ENCOUNTER — OFFICE VISIT (OUTPATIENT)
Dept: SURGERY | Age: 58
End: 2018-03-15

## 2018-03-15 VITALS
SYSTOLIC BLOOD PRESSURE: 125 MMHG | WEIGHT: 245.6 LBS | DIASTOLIC BLOOD PRESSURE: 86 MMHG | BODY MASS INDEX: 35.16 KG/M2 | HEIGHT: 70 IN | HEART RATE: 79 BPM

## 2018-03-15 DIAGNOSIS — I10 ESSENTIAL HYPERTENSION: Primary | ICD-10-CM

## 2018-03-15 DIAGNOSIS — R19.00 ABDOMINAL WALL BULGE: Primary | ICD-10-CM

## 2018-03-15 PROCEDURE — G8417 CALC BMI ABV UP PARAM F/U: HCPCS | Performed by: SURGERY

## 2018-03-15 PROCEDURE — 99214 OFFICE O/P EST MOD 30 MIN: CPT | Performed by: SURGERY

## 2018-03-15 PROCEDURE — G8427 DOCREV CUR MEDS BY ELIG CLIN: HCPCS | Performed by: SURGERY

## 2018-03-15 PROCEDURE — G8484 FLU IMMUNIZE NO ADMIN: HCPCS | Performed by: SURGERY

## 2018-03-15 PROCEDURE — 3017F COLORECTAL CA SCREEN DOC REV: CPT | Performed by: SURGERY

## 2018-03-15 NOTE — PROGRESS NOTES
No results for input(s): INR in the last 72 hours. Radiology Review: Images personally reviewed by me. NA      IMPRESSION/RECOMMENDATIONS:    63 yo with incisional hernia  1. Hernia reducible but tender and reportedly enlarging. 2.  Will obtain CT to assess size of hernia and rule out hernias in other locations given his prior history  3. Likely plan open repair with mesh unless multiple hernias seen on imaging. Then would consider robotic repair.     Caprice Rodriguez General Surgery  [id number]

## 2018-03-20 ENCOUNTER — OFFICE VISIT (OUTPATIENT)
Dept: FAMILY MEDICINE CLINIC | Age: 58
End: 2018-03-20

## 2018-03-20 ENCOUNTER — OFFICE VISIT (OUTPATIENT)
Dept: PAIN MANAGEMENT | Age: 58
End: 2018-03-20

## 2018-03-20 VITALS
DIASTOLIC BLOOD PRESSURE: 80 MMHG | SYSTOLIC BLOOD PRESSURE: 132 MMHG | TEMPERATURE: 99 F | BODY MASS INDEX: 34.72 KG/M2 | WEIGHT: 242 LBS | HEART RATE: 79 BPM | OXYGEN SATURATION: 99 %

## 2018-03-20 VITALS
HEART RATE: 71 BPM | WEIGHT: 242 LBS | BODY MASS INDEX: 34.72 KG/M2 | SYSTOLIC BLOOD PRESSURE: 143 MMHG | DIASTOLIC BLOOD PRESSURE: 92 MMHG

## 2018-03-20 DIAGNOSIS — R05.9 COUGH: Primary | ICD-10-CM

## 2018-03-20 DIAGNOSIS — M54.16 LUMBAR RADICULITIS: ICD-10-CM

## 2018-03-20 DIAGNOSIS — M50.30 DDD (DEGENERATIVE DISC DISEASE), CERVICAL: ICD-10-CM

## 2018-03-20 DIAGNOSIS — J20.8 ACUTE BRONCHITIS DUE TO OTHER SPECIFIED ORGANISMS: ICD-10-CM

## 2018-03-20 DIAGNOSIS — F11.10 NARCOTIC ABUSE (HCC): ICD-10-CM

## 2018-03-20 DIAGNOSIS — M17.12 PRIMARY OSTEOARTHRITIS OF LEFT KNEE: ICD-10-CM

## 2018-03-20 DIAGNOSIS — G89.4 CHRONIC PAIN SYNDROME: ICD-10-CM

## 2018-03-20 DIAGNOSIS — M51.36 DDD (DEGENERATIVE DISC DISEASE), LUMBAR: ICD-10-CM

## 2018-03-20 DIAGNOSIS — M79.18 MYOFASCIAL PAIN: ICD-10-CM

## 2018-03-20 LAB
INFLUENZA A ANTIGEN, POC: NORMAL
INFLUENZA B ANTIGEN, POC: NORMAL

## 2018-03-20 PROCEDURE — G8484 FLU IMMUNIZE NO ADMIN: HCPCS | Performed by: INTERNAL MEDICINE

## 2018-03-20 PROCEDURE — 99214 OFFICE O/P EST MOD 30 MIN: CPT | Performed by: NURSE PRACTITIONER

## 2018-03-20 PROCEDURE — G8427 DOCREV CUR MEDS BY ELIG CLIN: HCPCS | Performed by: NURSE PRACTITIONER

## 2018-03-20 PROCEDURE — 99213 OFFICE O/P EST LOW 20 MIN: CPT | Performed by: INTERNAL MEDICINE

## 2018-03-20 PROCEDURE — G8417 CALC BMI ABV UP PARAM F/U: HCPCS | Performed by: INTERNAL MEDICINE

## 2018-03-20 PROCEDURE — 1036F TOBACCO NON-USER: CPT | Performed by: INTERNAL MEDICINE

## 2018-03-20 PROCEDURE — 3017F COLORECTAL CA SCREEN DOC REV: CPT | Performed by: NURSE PRACTITIONER

## 2018-03-20 PROCEDURE — 1036F TOBACCO NON-USER: CPT | Performed by: NURSE PRACTITIONER

## 2018-03-20 PROCEDURE — 87804 INFLUENZA ASSAY W/OPTIC: CPT | Performed by: NURSE PRACTITIONER

## 2018-03-20 PROCEDURE — 3017F COLORECTAL CA SCREEN DOC REV: CPT | Performed by: INTERNAL MEDICINE

## 2018-03-20 PROCEDURE — G8417 CALC BMI ABV UP PARAM F/U: HCPCS | Performed by: NURSE PRACTITIONER

## 2018-03-20 PROCEDURE — G8484 FLU IMMUNIZE NO ADMIN: HCPCS | Performed by: NURSE PRACTITIONER

## 2018-03-20 PROCEDURE — G8427 DOCREV CUR MEDS BY ELIG CLIN: HCPCS | Performed by: INTERNAL MEDICINE

## 2018-03-20 RX ORDER — LUBIPROSTONE 24 UG/1
CAPSULE, GELATIN COATED ORAL
Qty: 60 CAPSULE | Refills: 0 | Status: SHIPPED | OUTPATIENT
Start: 2018-03-20 | End: 2018-08-29 | Stop reason: CLARIF

## 2018-03-20 RX ORDER — PREGABALIN 75 MG/1
CAPSULE ORAL
Qty: 90 CAPSULE | Refills: 0 | Status: SHIPPED | OUTPATIENT
Start: 2018-03-20 | End: 2018-08-29 | Stop reason: CLARIF

## 2018-03-20 RX ORDER — DULOXETIN HYDROCHLORIDE 60 MG/1
CAPSULE, DELAYED RELEASE ORAL
Qty: 30 CAPSULE | Refills: 0 | Status: SHIPPED | OUTPATIENT
Start: 2018-03-20 | End: 2018-08-29 | Stop reason: CLARIF

## 2018-03-20 RX ORDER — OXYCODONE AND ACETAMINOPHEN 7.5; 325 MG/1; MG/1
1 TABLET ORAL EVERY 6 HOURS PRN
Qty: 40 TABLET | Refills: 0 | Status: SHIPPED | OUTPATIENT
Start: 2018-03-20 | End: 2018-03-30

## 2018-03-20 RX ORDER — DEXTROMETHORPHAN HYDROBROMIDE AND PROMETHAZINE HYDROCHLORIDE 15; 6.25 MG/5ML; MG/5ML
5 SYRUP ORAL 4 TIMES DAILY PRN
Qty: 118 ML | Refills: 0 | Status: SHIPPED | OUTPATIENT
Start: 2018-03-20 | End: 2018-08-29 | Stop reason: CLARIF

## 2018-03-20 RX ORDER — ALBUTEROL SULFATE 90 UG/1
2 AEROSOL, METERED RESPIRATORY (INHALATION) EVERY 4 HOURS PRN
Qty: 1 INHALER | Refills: 5 | Status: SHIPPED | OUTPATIENT
Start: 2018-03-20 | End: 2019-11-18 | Stop reason: CLARIF

## 2018-03-20 RX ORDER — PREDNISONE 10 MG/1
TABLET ORAL
Qty: 21 EACH | Refills: 0 | Status: SHIPPED | OUTPATIENT
Start: 2018-03-20 | End: 2018-08-29 | Stop reason: CLARIF

## 2018-03-20 RX ORDER — AZITHROMYCIN 250 MG/1
TABLET, FILM COATED ORAL
Qty: 1 PACKET | Refills: 0 | Status: SHIPPED | OUTPATIENT
Start: 2018-03-20 | End: 2018-08-29 | Stop reason: CLARIF

## 2018-03-20 ASSESSMENT — ENCOUNTER SYMPTOMS
WHEEZING: 1
ABDOMINAL PAIN: 1
SPUTUM PRODUCTION: 1
SHORTNESS OF BREATH: 1
COUGH: 1

## 2018-03-20 NOTE — PROGRESS NOTES
Patient: Gemini Morris is a 62 y.o. male who presents today with the following Chief Complaint(s):  Chief Complaint   Patient presents with    Cough         HPI   This past Friday started with cough. Has worsened over the weekend and the past two days. Has coughing spells that make him feel like he is going to pass out and sees spots. Has an incisional hernia that he is going to get surgery on in the near future. This cough is making his hernia protrude and is very painful. Also has a stoma and colostomy bag that the cough is irritating. Denies fever but feels hot and sweaty today. Fatigued. Has chronic body aches and can't tell if they have been worse or not. Current Outpatient Prescriptions   Medication Sig Dispense Refill    azithromycin (ZITHROMAX) 250 MG tablet Take 2 tabs (500 mg) on Day 1, and take 1 tab (250 mg) on days 2 through 5. 1 packet 0    PredniSONE 10 MG (21) TBPK Take 6 pills at once on day one, 5pills on day two, 4 pills on day three, 3 pills on day four, 2 pills on day five, 1 pill on day six.  21 each 0    promethazine-dextromethorphan (PROMETHAZINE-DM) 6.25-15 MG/5ML syrup Take 5 mLs by mouth 4 times daily as needed for Cough 118 mL 0    albuterol sulfate HFA (PROAIR HFA) 108 (90 Base) MCG/ACT inhaler Inhale 2 puffs into the lungs every 4 hours as needed for Wheezing 1 Inhaler 5    ibuprofen (ADVIL;MOTRIN) 800 MG tablet Take 1 tablet by mouth every 6 hours as needed for Pain 120 tablet 3    lubiprostone (AMITIZA) 24 MCG capsule Take 1 capsule by mouth 2 times daily (with meals) 60 capsule 0    DULoxetine (CYMBALTA) 60 MG extended release capsule TAKE ONE CAPSULE BY MOUTH EVERY DAY 30 capsule 0    amLODIPine (NORVASC) 10 MG tablet TAKE 1 TABLET BY MOUTH EVERY DAY 30 tablet 3    losartan (COZAAR) 100 MG tablet TAKE 1 TABLET BY MOUTH EVERY DAY 30 tablet 3    levothyroxine (SYNTHROID) 25 MCG tablet TAKE 1 TABLET BY MOUTH EVERY DAY 30 tablet 1    buPROPion (WELLBUTRIN SR) 150 MG

## 2018-03-20 NOTE — PROGRESS NOTES
Theta Book  1960      HISTORY OF PRESENT ILLNESS:  Mr. Samantha Moura is a 62 y.o. male returns for a follow up visit for multiple medical problems. His current presenting problems are   1. Chronic pain syndrome    2. DDD (degenerative disc disease), lumbar    3. DDD (degenerative disc disease), cervical    4. Lumbar radiculitis    5. Primary osteoarthritis of left knee    6. Myofascial pain    7. Narcotic abuse    . As per information/history obtained from the PADT(patient assessment and documentation tool) - He complains of pain in the head, neck, shoulders Left, mid back and lower back with radiation to the hands Left, buttocks, hips Left and feet Right He rates the pain 3/10 and describes it as aching, throbbing, numbness, pins and needles. Pain is made worse by: nothing, movement, walking, standing, sitting, bending, lifting. Current treatment regimen has helped relieve about 50% of the pain. He denies side effects from the current pain regimen. Patient reports that since the last follow up visit the physical functioning is worse, family/social relationships are unchanged, mood is worse and sleep patterns are worse, and that the overall functioning is worse. Patient denies neurological bowel or bladder. Patient denies misusing/abusing his narcotic pain medications or using any illegal drugs. There are No indicators for possible drug abuse, addiction or diversion problems. Trent Cardenas Upon obtaining the medical history from Mr. Samantha Moura regarding today's office visit for his presenting problems, patient state she had shingles around his right side and under his armpit, no prior history of it. Mr. Loren Serra says he was given medications from his PCP for a week. He says he is going for a CT scan for incisional hernia, it is getting bigger. He states the Neurontin is not helping. He mentions he is struggling with everything, he lives on his own and managing his own ADL'S.  He mentions he missed his last

## 2018-03-21 ENCOUNTER — HOSPITAL ENCOUNTER (OUTPATIENT)
Dept: CT IMAGING | Age: 58
Discharge: HOME | End: 2018-03-21
Attending: SURGERY | Admitting: SURGERY

## 2018-03-21 DIAGNOSIS — R19.00 ABDOMINAL WALL BULGE: ICD-10-CM

## 2018-03-21 DIAGNOSIS — R19.00 INTRA-ABDOMINAL AND PELVIC SWELLING, MASS AND LUMP, UNSPECIFIED SITE: ICD-10-CM

## 2018-03-21 LAB
CREAT SERPL-MCNC: 0.8 MG/DL (ref 0.9–1.3)
GFR AFRICAN AMERICAN: >60
GFR NON-AFRICAN AMERICAN: >60

## 2018-03-22 ENCOUNTER — TELEPHONE (OUTPATIENT)
Dept: SURGERY | Age: 58
End: 2018-03-22

## 2018-04-02 DIAGNOSIS — E03.9 ACQUIRED HYPOTHYROIDISM: ICD-10-CM

## 2018-04-03 RX ORDER — LEVOTHYROXINE SODIUM 0.03 MG/1
TABLET ORAL
Qty: 30 TABLET | Refills: 2 | Status: SHIPPED | OUTPATIENT
Start: 2018-04-03 | End: 2018-07-06 | Stop reason: SDUPTHER

## 2018-05-07 ENCOUNTER — OFFICE VISIT (OUTPATIENT)
Dept: ORTHOPEDIC SURGERY | Age: 58
End: 2018-05-07

## 2018-05-07 VITALS
HEART RATE: 87 BPM | BODY MASS INDEX: 34.36 KG/M2 | SYSTOLIC BLOOD PRESSURE: 129 MMHG | DIASTOLIC BLOOD PRESSURE: 76 MMHG | HEIGHT: 70 IN | WEIGHT: 240 LBS

## 2018-05-07 DIAGNOSIS — S42.022A CLOSED DISPLACED FRACTURE OF SHAFT OF LEFT CLAVICLE, INITIAL ENCOUNTER: Primary | ICD-10-CM

## 2018-05-07 PROCEDURE — G8417 CALC BMI ABV UP PARAM F/U: HCPCS | Performed by: ORTHOPAEDIC SURGERY

## 2018-05-07 PROCEDURE — 3017F COLORECTAL CA SCREEN DOC REV: CPT | Performed by: ORTHOPAEDIC SURGERY

## 2018-05-07 PROCEDURE — 99214 OFFICE O/P EST MOD 30 MIN: CPT | Performed by: ORTHOPAEDIC SURGERY

## 2018-05-07 PROCEDURE — G8427 DOCREV CUR MEDS BY ELIG CLIN: HCPCS | Performed by: ORTHOPAEDIC SURGERY

## 2018-05-07 PROCEDURE — 1036F TOBACCO NON-USER: CPT | Performed by: ORTHOPAEDIC SURGERY

## 2018-06-04 ENCOUNTER — OFFICE VISIT (OUTPATIENT)
Dept: ORTHOPEDIC SURGERY | Age: 58
End: 2018-06-04

## 2018-06-04 VITALS — WEIGHT: 240 LBS | HEIGHT: 70 IN | BODY MASS INDEX: 34.36 KG/M2

## 2018-06-04 DIAGNOSIS — M89.8X1 PAIN OF LEFT CLAVICLE: Primary | ICD-10-CM

## 2018-06-04 PROCEDURE — 3017F COLORECTAL CA SCREEN DOC REV: CPT | Performed by: ORTHOPAEDIC SURGERY

## 2018-06-04 PROCEDURE — G8428 CUR MEDS NOT DOCUMENT: HCPCS | Performed by: ORTHOPAEDIC SURGERY

## 2018-06-04 PROCEDURE — G8417 CALC BMI ABV UP PARAM F/U: HCPCS | Performed by: ORTHOPAEDIC SURGERY

## 2018-06-04 PROCEDURE — 1036F TOBACCO NON-USER: CPT | Performed by: ORTHOPAEDIC SURGERY

## 2018-06-04 PROCEDURE — 99213 OFFICE O/P EST LOW 20 MIN: CPT | Performed by: ORTHOPAEDIC SURGERY

## 2018-06-06 DIAGNOSIS — I10 ESSENTIAL HYPERTENSION: ICD-10-CM

## 2018-06-06 RX ORDER — LOSARTAN POTASSIUM 100 MG/1
TABLET ORAL
Qty: 30 TABLET | Refills: 2 | Status: SHIPPED | OUTPATIENT
Start: 2018-06-06 | End: 2018-08-29 | Stop reason: SDUPTHER

## 2018-06-06 RX ORDER — AMLODIPINE BESYLATE 10 MG/1
TABLET ORAL
Qty: 30 TABLET | Refills: 2 | Status: SHIPPED | OUTPATIENT
Start: 2018-06-06 | End: 2018-08-29 | Stop reason: SDUPTHER

## 2018-06-29 ENCOUNTER — TELEPHONE (OUTPATIENT)
Dept: PAIN MANAGEMENT | Age: 58
End: 2018-06-29

## 2018-07-06 DIAGNOSIS — E03.9 ACQUIRED HYPOTHYROIDISM: ICD-10-CM

## 2018-07-09 RX ORDER — LEVOTHYROXINE SODIUM 0.03 MG/1
TABLET ORAL
Qty: 30 TABLET | Refills: 0 | Status: SHIPPED | OUTPATIENT
Start: 2018-07-09 | End: 2018-08-09 | Stop reason: SDUPTHER

## 2018-07-09 RX ORDER — IBUPROFEN 800 MG/1
800 TABLET ORAL EVERY 6 HOURS PRN
Qty: 120 TABLET | Refills: 0 | Status: SHIPPED | OUTPATIENT
Start: 2018-07-09 | End: 2018-08-09 | Stop reason: SDUPTHER

## 2018-07-12 DIAGNOSIS — G89.4 CHRONIC PAIN SYNDROME: ICD-10-CM

## 2018-07-12 RX ORDER — DULOXETIN HYDROCHLORIDE 60 MG/1
CAPSULE, DELAYED RELEASE ORAL
Qty: 30 CAPSULE | Refills: 0 | OUTPATIENT
Start: 2018-07-12

## 2018-08-09 DIAGNOSIS — E03.9 ACQUIRED HYPOTHYROIDISM: ICD-10-CM

## 2018-08-10 RX ORDER — IBUPROFEN 800 MG/1
800 TABLET ORAL EVERY 6 HOURS PRN
Qty: 120 TABLET | Refills: 0 | Status: SHIPPED | OUTPATIENT
Start: 2018-08-10 | End: 2018-08-29 | Stop reason: SDUPTHER

## 2018-08-10 RX ORDER — LEVOTHYROXINE SODIUM 0.03 MG/1
TABLET ORAL
Qty: 30 TABLET | Refills: 0 | Status: SHIPPED | OUTPATIENT
Start: 2018-08-10 | End: 2018-08-29 | Stop reason: SDUPTHER

## 2018-08-29 ENCOUNTER — OFFICE VISIT (OUTPATIENT)
Dept: FAMILY MEDICINE CLINIC | Age: 58
End: 2018-08-29

## 2018-08-29 VITALS
WEIGHT: 228 LBS | TEMPERATURE: 98 F | SYSTOLIC BLOOD PRESSURE: 130 MMHG | HEART RATE: 64 BPM | DIASTOLIC BLOOD PRESSURE: 82 MMHG | BODY MASS INDEX: 32.71 KG/M2

## 2018-08-29 DIAGNOSIS — I10 ESSENTIAL HYPERTENSION: ICD-10-CM

## 2018-08-29 DIAGNOSIS — R35.0 URINARY FREQUENCY: Primary | ICD-10-CM

## 2018-08-29 DIAGNOSIS — K62.89 RECTAL PAIN: ICD-10-CM

## 2018-08-29 DIAGNOSIS — R73.09 ELEVATED GLUCOSE: ICD-10-CM

## 2018-08-29 DIAGNOSIS — R11.0 NAUSEA: ICD-10-CM

## 2018-08-29 DIAGNOSIS — E03.9 ACQUIRED HYPOTHYROIDISM: ICD-10-CM

## 2018-08-29 LAB
A/G RATIO: 2 (ref 1.1–2.2)
ALBUMIN SERPL-MCNC: 4.9 G/DL (ref 3.4–5)
ALP BLD-CCNC: 129 U/L (ref 40–129)
ALT SERPL-CCNC: 13 U/L (ref 10–40)
ANION GAP SERPL CALCULATED.3IONS-SCNC: 14 MMOL/L (ref 3–16)
AST SERPL-CCNC: 13 U/L (ref 15–37)
BILIRUB SERPL-MCNC: 0.7 MG/DL (ref 0–1)
BUN BLDV-MCNC: 19 MG/DL (ref 7–20)
CALCIUM SERPL-MCNC: 9.7 MG/DL (ref 8.3–10.6)
CHLORIDE BLD-SCNC: 104 MMOL/L (ref 99–110)
CHOLESTEROL, TOTAL: 192 MG/DL (ref 0–199)
CO2: 23 MMOL/L (ref 21–32)
CREAT SERPL-MCNC: 0.9 MG/DL (ref 0.9–1.3)
GFR AFRICAN AMERICAN: >60
GFR NON-AFRICAN AMERICAN: >60
GLOBULIN: 2.5 G/DL
GLUCOSE BLD-MCNC: 102 MG/DL (ref 70–99)
HDLC SERPL-MCNC: 42 MG/DL (ref 40–60)
LDL CHOLESTEROL CALCULATED: 120 MG/DL
POTASSIUM SERPL-SCNC: 4.7 MMOL/L (ref 3.5–5.1)
SODIUM BLD-SCNC: 141 MMOL/L (ref 136–145)
T4 FREE: 1.1 NG/DL (ref 0.9–1.8)
TOTAL PROTEIN: 7.4 G/DL (ref 6.4–8.2)
TRIGL SERPL-MCNC: 149 MG/DL (ref 0–150)
TSH SERPL DL<=0.05 MIU/L-ACNC: 1.57 UIU/ML (ref 0.27–4.2)
VLDLC SERPL CALC-MCNC: 30 MG/DL

## 2018-08-29 PROCEDURE — 1036F TOBACCO NON-USER: CPT | Performed by: NURSE PRACTITIONER

## 2018-08-29 PROCEDURE — 3017F COLORECTAL CA SCREEN DOC REV: CPT | Performed by: NURSE PRACTITIONER

## 2018-08-29 PROCEDURE — 36415 COLL VENOUS BLD VENIPUNCTURE: CPT | Performed by: NURSE PRACTITIONER

## 2018-08-29 PROCEDURE — 99214 OFFICE O/P EST MOD 30 MIN: CPT | Performed by: NURSE PRACTITIONER

## 2018-08-29 PROCEDURE — G8427 DOCREV CUR MEDS BY ELIG CLIN: HCPCS | Performed by: NURSE PRACTITIONER

## 2018-08-29 PROCEDURE — G8417 CALC BMI ABV UP PARAM F/U: HCPCS | Performed by: NURSE PRACTITIONER

## 2018-08-29 RX ORDER — LOSARTAN POTASSIUM 100 MG/1
TABLET ORAL
Qty: 30 TABLET | Refills: 5 | Status: SHIPPED | OUTPATIENT
Start: 2018-08-29 | End: 2019-03-08 | Stop reason: SDUPTHER

## 2018-08-29 RX ORDER — LEVOTHYROXINE SODIUM 0.03 MG/1
TABLET ORAL
Qty: 30 TABLET | Refills: 3 | Status: SHIPPED | OUTPATIENT
Start: 2018-08-29 | End: 2019-02-07 | Stop reason: SDUPTHER

## 2018-08-29 RX ORDER — ONDANSETRON 4 MG/1
4 TABLET, ORALLY DISINTEGRATING ORAL EVERY 8 HOURS PRN
Qty: 20 TABLET | Refills: 5 | Status: SHIPPED | OUTPATIENT
Start: 2018-08-29 | End: 2019-02-07 | Stop reason: SDUPTHER

## 2018-08-29 RX ORDER — IBUPROFEN 800 MG/1
800 TABLET ORAL EVERY 6 HOURS PRN
Qty: 120 TABLET | Refills: 3 | Status: SHIPPED | OUTPATIENT
Start: 2018-08-29 | End: 2019-02-07 | Stop reason: SDUPTHER

## 2018-08-29 RX ORDER — AMLODIPINE BESYLATE 10 MG/1
TABLET ORAL
Qty: 30 TABLET | Refills: 5 | Status: SHIPPED | OUTPATIENT
Start: 2018-08-29 | End: 2019-03-08 | Stop reason: SDUPTHER

## 2018-08-29 ASSESSMENT — ENCOUNTER SYMPTOMS
SHORTNESS OF BREATH: 0
ABDOMINAL PAIN: 1
BACK PAIN: 1

## 2018-08-29 NOTE — PROGRESS NOTES
Patient: Helen Matos is a 62 y.o. male who presents today with the following Chief Complaint(s):  Chief Complaint   Patient presents with    Hypertension    Medication Refill         HPI   Jose Juan España is a 61 yo male who is here for follow up on HTN, hypothyroid, pain in rectum and right shoulder pain. HTN:norvasc and losartan stable  Hypothyroid: synthroid 25 mcg  Rectal pain: Sharp pain in rectum off and on- 3-5 times a day- has colostomy so now bowel movments from rectum  Right shoulder pain: States this has been going on for months. States he can't sleep with his arm up by his head like he has for years. Current Outpatient Prescriptions   Medication Sig Dispense Refill    levothyroxine (SYNTHROID) 25 MCG tablet TAKE 1 TABLET BY MOUTH EVERY DAY 30 tablet 3    ibuprofen (ADVIL;MOTRIN) 800 MG tablet Take 1 tablet by mouth every 6 hours as needed for Pain 120 tablet 3    amLODIPine (NORVASC) 10 MG tablet TAKE 1 TABLET BY MOUTH EVERY DAY 30 tablet 5    losartan (COZAAR) 100 MG tablet TAKE 1 TABLET BY MOUTH EVERY DAY 30 tablet 5    ondansetron (ZOFRAN ODT) 4 MG disintegrating tablet Take 1 tablet by mouth every 8 hours as needed for Nausea 20 tablet 5    albuterol sulfate HFA (PROAIR HFA) 108 (90 Base) MCG/ACT inhaler Inhale 2 puffs into the lungs every 4 hours as needed for Wheezing 1 Inhaler 5    buPROPion (WELLBUTRIN SR) 150 MG extended release tablet Take 150 mg by mouth daily PT STATES TAKES 150 OF SR  OF XL DAILY      buPROPion (WELLBUTRIN XL) 300 MG XL tablet Take 1 tablet by mouth every morning. 30 tablet 3    amphetamine-dextroamphetamine (ADDERALL) 30 MG tablet Take 60 mg by mouth 2 times daily Two 30 mg tabs bid      alprazolam (XANAX) 0.5 MG tablet Take 1 mg by mouth nightly as needed for Sleep Takes three nightly      naproxen (NAPROSYN) 500 MG tablet Take 1 tablet by mouth 2 times daily for 20 doses 20 tablet 0     No current facility-administered medications for this visit.

## 2018-08-30 LAB
ESTIMATED AVERAGE GLUCOSE: 116.9 MG/DL
HBA1C MFR BLD: 5.7 %

## 2018-10-09 DIAGNOSIS — E03.9 ACQUIRED HYPOTHYROIDISM: ICD-10-CM

## 2018-10-09 RX ORDER — IBUPROFEN 800 MG/1
800 TABLET ORAL EVERY 6 HOURS PRN
Qty: 120 TABLET | Refills: 1 | Status: SHIPPED | OUTPATIENT
Start: 2018-10-09 | End: 2019-09-30

## 2018-10-09 RX ORDER — LEVOTHYROXINE SODIUM 0.03 MG/1
TABLET ORAL
Qty: 30 TABLET | Refills: 5 | Status: SHIPPED | OUTPATIENT
Start: 2018-10-09 | End: 2019-09-30

## 2019-02-07 DIAGNOSIS — E03.9 ACQUIRED HYPOTHYROIDISM: ICD-10-CM

## 2019-02-07 DIAGNOSIS — R11.0 NAUSEA: ICD-10-CM

## 2019-02-08 RX ORDER — IBUPROFEN 800 MG/1
800 TABLET ORAL EVERY 6 HOURS PRN
Qty: 120 TABLET | Refills: 3 | Status: SHIPPED | OUTPATIENT
Start: 2019-02-08 | End: 2019-06-06 | Stop reason: SDUPTHER

## 2019-02-08 RX ORDER — LEVOTHYROXINE SODIUM 0.03 MG/1
TABLET ORAL
Qty: 30 TABLET | Refills: 2 | Status: SHIPPED | OUTPATIENT
Start: 2019-02-08 | End: 2019-05-08 | Stop reason: SDUPTHER

## 2019-02-08 RX ORDER — ONDANSETRON 4 MG/1
4 TABLET, ORALLY DISINTEGRATING ORAL EVERY 8 HOURS PRN
Qty: 20 TABLET | Refills: 3 | Status: SHIPPED | OUTPATIENT
Start: 2019-02-08 | End: 2022-01-24 | Stop reason: SDUPTHER

## 2019-03-08 DIAGNOSIS — I10 ESSENTIAL HYPERTENSION: ICD-10-CM

## 2019-03-08 RX ORDER — LOSARTAN POTASSIUM 100 MG/1
TABLET ORAL
Qty: 30 TABLET | Refills: 5 | Status: SHIPPED | OUTPATIENT
Start: 2019-03-08 | End: 2019-08-29 | Stop reason: SDUPTHER

## 2019-03-08 RX ORDER — AMLODIPINE BESYLATE 10 MG/1
TABLET ORAL
Qty: 30 TABLET | Refills: 5 | Status: SHIPPED | OUTPATIENT
Start: 2019-03-08 | End: 2019-08-29 | Stop reason: SDUPTHER

## 2019-05-08 DIAGNOSIS — E03.9 ACQUIRED HYPOTHYROIDISM: ICD-10-CM

## 2019-05-09 RX ORDER — LEVOTHYROXINE SODIUM 0.03 MG/1
TABLET ORAL
Qty: 30 TABLET | Refills: 2 | Status: SHIPPED | OUTPATIENT
Start: 2019-05-09 | End: 2019-08-01 | Stop reason: SDUPTHER

## 2019-06-06 RX ORDER — IBUPROFEN 800 MG/1
800 TABLET ORAL EVERY 6 HOURS PRN
Qty: 120 TABLET | Refills: 3 | Status: SHIPPED | OUTPATIENT
Start: 2019-06-06 | End: 2019-09-30 | Stop reason: SDUPTHER

## 2019-08-01 DIAGNOSIS — E03.9 ACQUIRED HYPOTHYROIDISM: ICD-10-CM

## 2019-08-02 RX ORDER — LEVOTHYROXINE SODIUM 0.03 MG/1
TABLET ORAL
Qty: 30 TABLET | Refills: 2 | Status: SHIPPED | OUTPATIENT
Start: 2019-08-02 | End: 2019-10-31 | Stop reason: SDUPTHER

## 2019-09-30 ENCOUNTER — OFFICE VISIT (OUTPATIENT)
Dept: PAIN MANAGEMENT | Age: 59
End: 2019-09-30
Payer: MEDICARE

## 2019-09-30 VITALS
HEART RATE: 81 BPM | WEIGHT: 190 LBS | BODY MASS INDEX: 27.26 KG/M2 | DIASTOLIC BLOOD PRESSURE: 85 MMHG | SYSTOLIC BLOOD PRESSURE: 158 MMHG

## 2019-09-30 DIAGNOSIS — G89.4 CHRONIC PAIN SYNDROME: ICD-10-CM

## 2019-09-30 DIAGNOSIS — M50.30 DDD (DEGENERATIVE DISC DISEASE), CERVICAL: ICD-10-CM

## 2019-09-30 DIAGNOSIS — F51.01 PRIMARY INSOMNIA: ICD-10-CM

## 2019-09-30 DIAGNOSIS — M79.18 MYOFASCIAL PAIN: ICD-10-CM

## 2019-09-30 DIAGNOSIS — I10 ESSENTIAL HYPERTENSION: ICD-10-CM

## 2019-09-30 DIAGNOSIS — M97.11XS PERIPROSTHETIC FRACTURE AROUND INTERNAL PROSTHETIC RIGHT KNEE JOINT, SEQUELA: ICD-10-CM

## 2019-09-30 DIAGNOSIS — F39 MOOD DISORDER (HCC): ICD-10-CM

## 2019-09-30 DIAGNOSIS — M54.16 LUMBAR RADICULITIS: ICD-10-CM

## 2019-09-30 DIAGNOSIS — M51.36 DDD (DEGENERATIVE DISC DISEASE), LUMBAR: ICD-10-CM

## 2019-09-30 PROCEDURE — 99214 OFFICE O/P EST MOD 30 MIN: CPT | Performed by: INTERNAL MEDICINE

## 2019-09-30 RX ORDER — AMITRIPTYLINE HYDROCHLORIDE 25 MG/1
25-50 TABLET, FILM COATED ORAL NIGHTLY
Qty: 60 TABLET | Refills: 0 | Status: SHIPPED | OUTPATIENT
Start: 2019-09-30 | End: 2019-11-18 | Stop reason: CLARIF

## 2019-09-30 RX ORDER — LOSARTAN POTASSIUM 100 MG/1
TABLET ORAL
Qty: 30 TABLET | Refills: 0 | Status: SHIPPED | OUTPATIENT
Start: 2019-09-30 | End: 2019-10-31 | Stop reason: SDUPTHER

## 2019-09-30 RX ORDER — AMLODIPINE BESYLATE 10 MG/1
TABLET ORAL
Qty: 30 TABLET | Refills: 0 | Status: SHIPPED | OUTPATIENT
Start: 2019-09-30 | End: 2019-10-31 | Stop reason: SDUPTHER

## 2019-09-30 RX ORDER — CELECOXIB 200 MG/1
200 CAPSULE ORAL DAILY
Qty: 60 CAPSULE | Refills: 0 | Status: SHIPPED | OUTPATIENT
Start: 2019-09-30 | End: 2019-11-18 | Stop reason: CLARIF

## 2019-09-30 RX ORDER — PREGABALIN 75 MG/1
CAPSULE ORAL
Qty: 60 CAPSULE | Refills: 0 | Status: SHIPPED | OUTPATIENT
Start: 2019-09-30 | End: 2020-04-30 | Stop reason: ALTCHOICE

## 2019-09-30 RX ORDER — IBUPROFEN 800 MG/1
800 TABLET ORAL EVERY 6 HOURS PRN
Qty: 120 TABLET | Refills: 3 | Status: SHIPPED | OUTPATIENT
Start: 2019-09-30 | End: 2019-09-30

## 2019-09-30 NOTE — PROGRESS NOTES
his back has been \" shut down\" and cant do anything. He reports he has not had any recent imaging studies. Patient states his sleep is fair. Has fairly normal sleep latency. Averages about 4-6 hours of sleep a night. Denies any signs of sleep apnea. Feels somewhat rested in the morning. Patient's  subjective report of his mood is fair. he describes occasional symptoms of depression, occasional  irritability and some mood swings. Describes his mood as being neutral and reports some pleasure in his daily activities. Reports  fair  appetite, energy and concentration. Able to function well in different aspects of his daily activities. Denies suicidal or homicidal ideation. Denies any complaints of increased tension, does   Worry sometimes and occasional  irritability  he denies any c/o increased anxiety, No c/o panic attacks or symptoms of PTSD. ALLERGIES/PAST MED/FAM/SOC HISTORY: Mr. Paris Ramesh allergies, past medical, family and social history were reviewed in the chart and also listed below. Social History     Socioeconomic History    Marital status: Single     Spouse name: Natalie Burger Number of children: Not on file    Years of education: 15    Highest education level: Not on file   Occupational History    Occupation: disabled   Social Needs    Financial resource strain: Not on file    Food insecurity:     Worry: Not on file     Inability: Not on file   [employer] needs:     Medical: Not on file     Non-medical: Not on file   Tobacco Use    Smoking status: Former Smoker     Packs/day: 1.50     Years: 16.00     Pack years: 24.00     Last attempt to quit: 2000     Years since quittin.7    Smokeless tobacco: Never Used   Substance and Sexual Activity    Alcohol use:  Yes     Alcohol/week: 0.0 standard drinks     Comment: occas    Drug use: No    Sexual activity: Yes     Partners: Female     Comment: 3-4 cups / day    Lifestyle    Physical activity:     Days per week: Not on file against drinking alcohol with the narcotic pain medicines, advised against driving or handling machinery when  starting or adjusting the dose of medicines, feeling groggy or drowsy, or if having any cognitive issues related to the current medications. Heis fully aware of the risk of overdose and death, if medicines are misused and not taken as prescribed. If he develops new symptoms or if the symptoms worsen, he was told to call the office. .  Thank you for allowing me to participate in the care of this patient.     Sara Carter MD.    Cc: NATALEE Richey - CNP

## 2019-10-09 ENCOUNTER — TELEPHONE (OUTPATIENT)
Dept: PAIN MANAGEMENT | Age: 59
End: 2019-10-09

## 2019-10-16 ENCOUNTER — TELEPHONE (OUTPATIENT)
Dept: PAIN MANAGEMENT | Age: 59
End: 2019-10-16

## 2019-10-31 DIAGNOSIS — F51.01 PRIMARY INSOMNIA: ICD-10-CM

## 2019-10-31 DIAGNOSIS — E03.9 ACQUIRED HYPOTHYROIDISM: ICD-10-CM

## 2019-10-31 DIAGNOSIS — G89.4 CHRONIC PAIN SYNDROME: ICD-10-CM

## 2019-10-31 DIAGNOSIS — I10 ESSENTIAL HYPERTENSION: ICD-10-CM

## 2019-10-31 RX ORDER — LOSARTAN POTASSIUM 100 MG/1
TABLET ORAL
Qty: 30 TABLET | Refills: 0 | Status: SHIPPED | OUTPATIENT
Start: 2019-10-31 | End: 2019-11-26 | Stop reason: SDUPTHER

## 2019-10-31 RX ORDER — LEVOTHYROXINE SODIUM 0.03 MG/1
TABLET ORAL
Qty: 30 TABLET | Refills: 2 | Status: SHIPPED | OUTPATIENT
Start: 2019-10-31 | End: 2020-01-30

## 2019-10-31 RX ORDER — AMLODIPINE BESYLATE 10 MG/1
TABLET ORAL
Qty: 30 TABLET | Refills: 0 | Status: SHIPPED | OUTPATIENT
Start: 2019-10-31 | End: 2019-11-26 | Stop reason: SDUPTHER

## 2019-11-18 ENCOUNTER — NURSE TRIAGE (OUTPATIENT)
Dept: OTHER | Facility: CLINIC | Age: 59
End: 2019-11-18

## 2019-11-18 ENCOUNTER — OFFICE VISIT (OUTPATIENT)
Dept: FAMILY MEDICINE CLINIC | Age: 59
End: 2019-11-18
Payer: MEDICARE

## 2019-11-18 VITALS
SYSTOLIC BLOOD PRESSURE: 140 MMHG | WEIGHT: 211 LBS | HEART RATE: 52 BPM | OXYGEN SATURATION: 95 % | DIASTOLIC BLOOD PRESSURE: 88 MMHG | TEMPERATURE: 98.1 F | BODY MASS INDEX: 30.28 KG/M2

## 2019-11-18 DIAGNOSIS — M54.50 ACUTE RIGHT-SIDED LOW BACK PAIN WITHOUT SCIATICA: ICD-10-CM

## 2019-11-18 DIAGNOSIS — M54.2 NECK PAIN: Primary | ICD-10-CM

## 2019-11-18 PROCEDURE — G8417 CALC BMI ABV UP PARAM F/U: HCPCS | Performed by: NURSE PRACTITIONER

## 2019-11-18 PROCEDURE — 99213 OFFICE O/P EST LOW 20 MIN: CPT | Performed by: NURSE PRACTITIONER

## 2019-11-18 PROCEDURE — G8427 DOCREV CUR MEDS BY ELIG CLIN: HCPCS | Performed by: NURSE PRACTITIONER

## 2019-11-18 PROCEDURE — G8484 FLU IMMUNIZE NO ADMIN: HCPCS | Performed by: NURSE PRACTITIONER

## 2019-11-18 PROCEDURE — 1036F TOBACCO NON-USER: CPT | Performed by: NURSE PRACTITIONER

## 2019-11-18 PROCEDURE — 3017F COLORECTAL CA SCREEN DOC REV: CPT | Performed by: NURSE PRACTITIONER

## 2019-11-18 RX ORDER — CYCLOBENZAPRINE HCL 10 MG
10 TABLET ORAL NIGHTLY PRN
Qty: 10 TABLET | Refills: 0 | Status: SHIPPED | OUTPATIENT
Start: 2019-11-18 | End: 2019-11-26 | Stop reason: SDUPTHER

## 2019-11-18 ASSESSMENT — ENCOUNTER SYMPTOMS
SHORTNESS OF BREATH: 0
WHEEZING: 0
BACK PAIN: 1

## 2019-11-21 RX ORDER — AMITRIPTYLINE HYDROCHLORIDE 25 MG/1
25-50 TABLET, FILM COATED ORAL NIGHTLY
Qty: 60 TABLET | Refills: 0 | OUTPATIENT
Start: 2019-11-21

## 2019-11-26 DIAGNOSIS — M54.50 ACUTE RIGHT-SIDED LOW BACK PAIN WITHOUT SCIATICA: ICD-10-CM

## 2019-11-26 DIAGNOSIS — M54.2 NECK PAIN: ICD-10-CM

## 2019-11-26 DIAGNOSIS — I10 ESSENTIAL HYPERTENSION: ICD-10-CM

## 2019-11-27 RX ORDER — CYCLOBENZAPRINE HCL 10 MG
10 TABLET ORAL NIGHTLY PRN
Qty: 10 TABLET | Refills: 0 | Status: SHIPPED | OUTPATIENT
Start: 2019-11-27 | End: 2019-12-07

## 2019-11-27 RX ORDER — AMLODIPINE BESYLATE 10 MG/1
TABLET ORAL
Qty: 30 TABLET | Refills: 0 | Status: SHIPPED | OUTPATIENT
Start: 2019-11-27 | End: 2019-12-30

## 2019-11-27 RX ORDER — LOSARTAN POTASSIUM 100 MG/1
TABLET ORAL
Qty: 30 TABLET | Refills: 0 | Status: SHIPPED | OUTPATIENT
Start: 2019-11-27 | End: 2019-12-30

## 2019-12-09 RX ORDER — CYCLOBENZAPRINE HCL 10 MG
TABLET ORAL
Qty: 30 TABLET | Refills: 0 | Status: SHIPPED | OUTPATIENT
Start: 2019-12-09 | End: 2019-12-30

## 2019-12-27 DIAGNOSIS — I10 ESSENTIAL HYPERTENSION: ICD-10-CM

## 2019-12-30 RX ORDER — LOSARTAN POTASSIUM 100 MG/1
TABLET ORAL
Qty: 30 TABLET | Refills: 0 | Status: SHIPPED | OUTPATIENT
Start: 2019-12-30 | End: 2020-01-29

## 2019-12-30 RX ORDER — CYCLOBENZAPRINE HCL 10 MG
TABLET ORAL
Qty: 10 TABLET | Refills: 0 | Status: SHIPPED | OUTPATIENT
Start: 2019-12-30 | End: 2020-04-30 | Stop reason: ALTCHOICE

## 2019-12-30 RX ORDER — AMLODIPINE BESYLATE 10 MG/1
TABLET ORAL
Qty: 30 TABLET | Refills: 0 | Status: SHIPPED | OUTPATIENT
Start: 2019-12-30 | End: 2020-01-29

## 2020-01-28 ENCOUNTER — OFFICE VISIT (OUTPATIENT)
Dept: SURGERY | Age: 60
End: 2020-01-28
Payer: MEDICARE

## 2020-01-28 VITALS
WEIGHT: 208 LBS | BODY MASS INDEX: 29.78 KG/M2 | HEIGHT: 70 IN | SYSTOLIC BLOOD PRESSURE: 145 MMHG | RESPIRATION RATE: 20 BRPM | DIASTOLIC BLOOD PRESSURE: 93 MMHG

## 2020-01-28 PROCEDURE — 3017F COLORECTAL CA SCREEN DOC REV: CPT | Performed by: SURGERY

## 2020-01-28 PROCEDURE — G8484 FLU IMMUNIZE NO ADMIN: HCPCS | Performed by: SURGERY

## 2020-01-28 PROCEDURE — G8417 CALC BMI ABV UP PARAM F/U: HCPCS | Performed by: SURGERY

## 2020-01-28 PROCEDURE — 1036F TOBACCO NON-USER: CPT | Performed by: SURGERY

## 2020-01-28 PROCEDURE — G8427 DOCREV CUR MEDS BY ELIG CLIN: HCPCS | Performed by: SURGERY

## 2020-01-28 PROCEDURE — 99204 OFFICE O/P NEW MOD 45 MIN: CPT | Performed by: SURGERY

## 2020-01-28 NOTE — PROGRESS NOTES
right shoulder     right shoulder rotator cuff release       Past Surgical History:   Procedure Laterality Date    ACHILLES TENDON SURGERY      right    APPENDECTOMY      BLADDER SURGERY      fistula x2    CARPAL TUNNEL RELEASE  12    Release of Right Transverse Carpal Tunnel Ligament    COLON SURGERY      COLOSTOMY      CYST REMOVAL      CYSTOSCOPY  3/13/14    HERNIA REPAIR  14    lysis of adhesions    JOINT REPLACEMENT      KNEE ARTHROPLASTY  [id number]    right total    KNEE ARTHROSCOPY      left    OTHER SURGICAL HISTORY  12    Biopsy of Lesion in Colostomy    REVISION COLOSTOMY Left 3/13/14    Removal of polyps    ROTATOR CUFF REPAIR      right    TONSILLECTOMY      TOTAL KNEE ARTHROPLASTY      TOTAL KNEE ARTHROPLASTY Left 2016       Family History: denies family history of colon cancer    Social History:   Social History     Tobacco Use    Smoking status: Former Smoker     Packs/day: 1.50     Years: 16.00     Pack years: 24.00     Last attempt to quit: 2000     Years since quittin.0    Smokeless tobacco: Never Used   Substance Use Topics    Alcohol use: Yes     Alcohol/week: 0.0 standard drinks     Comment: occas      Tobacco cessation counseling provided as appropriate. REVIEW OF SYSTEMS:    Pertinent positives and negatives are mentioned in the HPI above. Otherwise, all other systems were reviewed and negative. Objective:     Physical Exam   BP (!) 145/93 (Site: Left Upper Arm, Position: Sitting, Cuff Size: Medium Adult)   Resp 20   Ht 5' 10\" (1.778 m)   Wt 208 lb (94.3 kg)   BMI 29.84 kg/m²   Constitutional: Appears well-developed and well-nourished. Grooming appropriate. No gross deformities. Body mass index is 29.84 kg/m². Eyes: No scleral icterus. Conjunctiva/lids normal. Vision intact grossly. Pupils equal/symmetric, reactive bilaterally. ENT: External ears/nose without defect, scars, or masses. Hearing grossly intact.  No facial deformity. Lips normal, normal dentition. Neck: No masses. Trachea midline. No crepitus. Thyroid not enlarged. Cardiovascular: Normal rate. No peripheral edema. Abdominal aorta normal size to palpation. Pulmonary/Chest: Effort normal. No respiratory distress. No wheezes. No use of accessory muscles. Musculoskeletal: Normal range of motion x all 4 extremities and head/neck, without deformity, pain, or crepitus, with normal strength and tone. Normal gait. Nails without clubbing or cyanosis. Neurological: Alert and oriented to person, place, and time. No gross deficits. Sensation intact. Skin: Skin is dry. No rashes noted. No pallor. No induration of nodules. Psychiatric: Normal mood and affect. Behavior normal. Oriented to person, place, and time. Judgment and insight reasonable. Abdominal/wound: previous midline incision, colostomy in place    Pertinent recent lab and radiology results reviewed. CT scan ordered. C-scope ordered. Assessment/Plan:     A/P:  New problem(s): Colostomy present, parastomal hernia  Established problem(s): previous smoker, chronic pain  Additional workup/treatment planned: C-scope, CT scan, referral to [facility] for possible combo repair and colostomy reversal.  Risk of complications/morbidity: high    Long discussion regarding potential for colsotomy closure. Was told in past that he has a frozen pelvis and unable to be re-anastomosed. Discussed with him that this may be true, and closure may cause more harm than good. As well, need to be prepared during the case for redo hernia reapri, mesh excision, and repair of parastomal hernia. For now, needs C-scope to evaluate proximal and distal colon remaining and CT scan. After that, will see back in coordination with [facility] for further discussion and planning if desired. Overall he seemed appropriately hesitant given multiple previous surgeries, which is understandable.     DISPOSITION:  F/u with myself and [facility] after CT scan and C-scope    My findings will be relayed to consulting practitioner or PCP via Epic    Total encounter time:  45 min with > 50% spent with face-to-face counseling and coordination of care, including: discussion, review of records, Care Everywhere, coordination of care    Note completed using dictation software, please excuse any errors.     Electronically signed by Kirsty Reeder MD on 1/28/2020 at 6:24 PM

## 2020-01-29 RX ORDER — LOSARTAN POTASSIUM 100 MG/1
TABLET ORAL
Qty: 30 TABLET | Refills: 0 | Status: SHIPPED | OUTPATIENT
Start: 2020-01-29 | End: 2020-02-25

## 2020-01-29 RX ORDER — AMLODIPINE BESYLATE 10 MG/1
TABLET ORAL
Qty: 30 TABLET | Refills: 0 | Status: SHIPPED | OUTPATIENT
Start: 2020-01-29 | End: 2020-02-25

## 2020-01-30 RX ORDER — LEVOTHYROXINE SODIUM 0.03 MG/1
TABLET ORAL
Qty: 30 TABLET | Refills: 2 | Status: SHIPPED | OUTPATIENT
Start: 2020-01-30 | End: 2020-04-16

## 2020-01-30 RX ORDER — POLYETHYLENE GLYCOL 3350 17 G/17G
POWDER, FOR SOLUTION ORAL
Qty: 238 G | Refills: 0 | Status: ON HOLD | OUTPATIENT
Start: 2020-01-30 | End: 2020-03-12 | Stop reason: ALTCHOICE

## 2020-01-30 RX ORDER — IBUPROFEN 800 MG/1
800 TABLET ORAL EVERY 6 HOURS PRN
Qty: 120 TABLET | Refills: 3 | Status: SHIPPED | OUTPATIENT
Start: 2020-01-30 | End: 2020-05-05

## 2020-02-03 ENCOUNTER — HOSPITAL ENCOUNTER (OUTPATIENT)
Age: 60
Discharge: HOME | End: 2020-02-03
Payer: MEDICARE

## 2020-02-03 ENCOUNTER — HOSPITAL ENCOUNTER (OUTPATIENT)
Dept: CT IMAGING | Age: 60
Discharge: HOME | End: 2020-02-03
Payer: MEDICARE

## 2020-02-03 LAB
ANION GAP SERPL CALCULATED.3IONS-SCNC: 11 MMOL/L (ref 3–16)
BUN BLDV-MCNC: 15 MG/DL (ref 7–20)
CALCIUM SERPL-MCNC: 9.4 MG/DL (ref 8.3–10.6)
CHLORIDE BLD-SCNC: 104 MMOL/L (ref 99–110)
CO2: 24 MMOL/L (ref 21–32)
CREAT SERPL-MCNC: 0.8 MG/DL (ref 0.9–1.3)
GFR AFRICAN AMERICAN: >60
GFR NON-AFRICAN AMERICAN: >60
GLUCOSE BLD-MCNC: 113 MG/DL (ref 70–99)
HCT VFR BLD CALC: 45.2 % (ref 40.5–52.5)
HEMOGLOBIN: 15 G/DL (ref 13.5–17.5)
MCH RBC QN AUTO: 30.1 PG (ref 26–34)
MCHC RBC AUTO-ENTMCNC: 33.2 G/DL (ref 31–36)
MCV RBC AUTO: 90.7 FL (ref 80–100)
PDW BLD-RTO: 13.8 % (ref 12.4–15.4)
PLATELET # BLD: 273 K/UL (ref 135–450)
PMV BLD AUTO: 7.3 FL (ref 5–10.5)
POTASSIUM SERPL-SCNC: 4.5 MMOL/L (ref 3.5–5.1)
RBC # BLD: 4.99 M/UL (ref 4.2–5.9)
SODIUM BLD-SCNC: 139 MMOL/L (ref 136–145)
WBC # BLD: 6.3 K/UL (ref 4–11)

## 2020-02-03 PROCEDURE — 85027 COMPLETE CBC AUTOMATED: CPT

## 2020-02-03 PROCEDURE — 36415 COLL VENOUS BLD VENIPUNCTURE: CPT

## 2020-02-03 PROCEDURE — 6360000004 HC RX CONTRAST MEDICATION: Performed by: SURGERY

## 2020-02-03 PROCEDURE — 74177 CT ABD & PELVIS W/CONTRAST: CPT

## 2020-02-03 PROCEDURE — 80048 BASIC METABOLIC PNL TOTAL CA: CPT

## 2020-02-03 RX ADMIN — IOHEXOL 50 ML: 240 INJECTION, SOLUTION INTRATHECAL; INTRAVASCULAR; INTRAVENOUS; ORAL at 15:28

## 2020-02-03 RX ADMIN — IOPAMIDOL 75 ML: 755 INJECTION, SOLUTION INTRAVENOUS at 15:28

## 2020-02-04 ENCOUNTER — TELEPHONE (OUTPATIENT)
Dept: GASTROENTEROLOGY | Age: 60
End: 2020-02-04

## 2020-02-04 NOTE — TELEPHONE ENCOUNTER
----- Message from Betsy Ariza MD sent at 2/4/2020 11:12 AM EST -----  Bruno Agosto as no show for colonoscopy today.

## 2020-02-04 NOTE — TELEPHONE ENCOUNTER
Take 2-3 doses of miralax every day for weeks leading up to the colonoscopy then 2 day prep  Mag citrate, hui.

## 2020-02-07 ENCOUNTER — TELEPHONE (OUTPATIENT)
Dept: GASTROENTEROLOGY | Age: 60
End: 2020-02-07

## 2020-02-07 NOTE — TELEPHONE ENCOUNTER
MD Chelsey Yanes MA             Disregard previous message.  Did not have movement with prep.  See previous note     Take 2-3 doses of miralax every day for weeks leading up to the colonoscopy then 2 day prep   Mag citrate, hui.

## 2020-02-10 ENCOUNTER — TELEPHONE (OUTPATIENT)
Dept: SURGERY | Age: 60
End: 2020-02-10

## 2020-02-10 NOTE — TELEPHONE ENCOUNTER
Patient is requesting a return call. regarding having colonoscopy scheduled and what the next steps will be after colonoscopy. Patient is in the middle of moving and is asking if Dr Ace Sosa is going to want to do surgery depending on outcome of tests. Please advise. Thank you!

## 2020-02-13 ENCOUNTER — TELEPHONE (OUTPATIENT)
Dept: FAMILY MEDICINE CLINIC | Age: 60
End: 2020-02-13

## 2020-02-18 NOTE — TELEPHONE ENCOUNTER
Patient called back to r/s colon. However once I was going over the 2 day prep with patient  he admitted to not taking the miralax/dulcolax correctly. He kept stating the prep did work he just did not take it at the correct time therefore it worked after the scope. Patient was very adamant about me asking if he can try that same prep again.

## 2020-02-19 ENCOUNTER — TELEPHONE (OUTPATIENT)
Dept: SURGERY | Age: 60
End: 2020-02-19

## 2020-02-19 RX ORDER — POLYETHYLENE GLYCOL 3350 17 G/17G
POWDER, FOR SOLUTION ORAL
Qty: 238 G | Refills: 0 | Status: ON HOLD | OUTPATIENT
Start: 2020-02-19 | End: 2020-02-25 | Stop reason: HOSPADM

## 2020-02-25 ENCOUNTER — HOSPITAL ENCOUNTER (OUTPATIENT)
Age: 60
Setting detail: OUTPATIENT SURGERY
Discharge: HOME | End: 2020-02-25
Attending: INTERNAL MEDICINE | Admitting: INTERNAL MEDICINE
Payer: MEDICARE

## 2020-02-25 ENCOUNTER — TELEPHONE (OUTPATIENT)
Dept: GASTROENTEROLOGY | Age: 60
End: 2020-02-25

## 2020-02-25 VITALS
DIASTOLIC BLOOD PRESSURE: 80 MMHG | HEART RATE: 63 BPM | BODY MASS INDEX: 28.63 KG/M2 | OXYGEN SATURATION: 95 % | SYSTOLIC BLOOD PRESSURE: 131 MMHG | WEIGHT: 200 LBS | HEIGHT: 70 IN | TEMPERATURE: 97 F | RESPIRATION RATE: 20 BRPM

## 2020-02-25 PROCEDURE — 2709999900 HC NON-CHARGEABLE SUPPLY: Performed by: INTERNAL MEDICINE

## 2020-02-25 PROCEDURE — 7100000010 HC PHASE II RECOVERY - FIRST 15 MIN: Performed by: INTERNAL MEDICINE

## 2020-02-25 PROCEDURE — 6360000002 HC RX W HCPCS: Performed by: INTERNAL MEDICINE

## 2020-02-25 PROCEDURE — 7100000011 HC PHASE II RECOVERY - ADDTL 15 MIN: Performed by: INTERNAL MEDICINE

## 2020-02-25 PROCEDURE — G0105 COLORECTAL SCRN; HI RISK IND: HCPCS | Performed by: INTERNAL MEDICINE

## 2020-02-25 PROCEDURE — 99153 MOD SED SAME PHYS/QHP EA: CPT | Performed by: INTERNAL MEDICINE

## 2020-02-25 PROCEDURE — 99152 MOD SED SAME PHYS/QHP 5/>YRS: CPT | Performed by: INTERNAL MEDICINE

## 2020-02-25 PROCEDURE — 3609027000 HC COLONOSCOPY: Performed by: INTERNAL MEDICINE

## 2020-02-25 RX ORDER — SODIUM PHOSPHATE, DIBASIC AND SODIUM PHOSPHATE, MONOBASIC 7; 19 G/133ML; G/133ML
1 ENEMA RECTAL
Qty: 30 BOTTLE | Refills: 3 | COMMUNITY
Start: 2020-02-25 | End: 2020-02-25

## 2020-02-25 RX ORDER — AMLODIPINE BESYLATE 10 MG/1
TABLET ORAL
Qty: 30 TABLET | Refills: 0 | Status: SHIPPED | OUTPATIENT
Start: 2020-02-25 | End: 2020-03-18

## 2020-02-25 RX ORDER — LOSARTAN POTASSIUM 100 MG/1
TABLET ORAL
Qty: 30 TABLET | Refills: 0 | Status: SHIPPED | OUTPATIENT
Start: 2020-02-25 | End: 2020-03-18

## 2020-02-25 RX ORDER — SODIUM CHLORIDE, SODIUM LACTATE, POTASSIUM CHLORIDE, CALCIUM CHLORIDE 600; 310; 30; 20 MG/100ML; MG/100ML; MG/100ML; MG/100ML
INJECTION, SOLUTION INTRAVENOUS CONTINUOUS
Status: DISCONTINUED | OUTPATIENT
Start: 2020-02-25 | End: 2020-02-25 | Stop reason: HOSPADM

## 2020-02-25 RX ORDER — FENTANYL CITRATE 50 UG/ML
INJECTION, SOLUTION INTRAMUSCULAR; INTRAVENOUS PRN
Status: DISCONTINUED | OUTPATIENT
Start: 2020-02-25 | End: 2020-02-25 | Stop reason: ALTCHOICE

## 2020-02-25 RX ORDER — MIDAZOLAM HYDROCHLORIDE 5 MG/ML
INJECTION INTRAMUSCULAR; INTRAVENOUS PRN
Status: DISCONTINUED | OUTPATIENT
Start: 2020-02-25 | End: 2020-02-25 | Stop reason: ALTCHOICE

## 2020-02-25 RX ORDER — DIPHENHYDRAMINE HYDROCHLORIDE 50 MG/ML
INJECTION INTRAMUSCULAR; INTRAVENOUS PRN
Status: DISCONTINUED | OUTPATIENT
Start: 2020-02-25 | End: 2020-02-25 | Stop reason: ALTCHOICE

## 2020-02-25 ASSESSMENT — PAIN - FUNCTIONAL ASSESSMENT: PAIN_FUNCTIONAL_ASSESSMENT: 0-10

## 2020-02-25 ASSESSMENT — PAIN SCALES - GENERAL: PAINLEVEL_OUTOF10: 0

## 2020-02-25 ASSESSMENT — PAIN DESCRIPTION - DESCRIPTORS: DESCRIPTORS: ACHING

## 2020-02-25 NOTE — TELEPHONE ENCOUNTER
Alireza David with [facility] called in to inform us that pt has already picked up the dulcolax and miralax.

## 2020-02-25 NOTE — OP NOTE
[address]  [address] [facility]  Phone: [phone]   BGB:[phone]    Colonoscopy Procedure Note    Patient: Rody Abreu  : 1960    Procedure: Colonoscopy with --screening    Date:  2020     Endoscopist:  Cornel Jennings MD    Referring Physician:  Genoveva Villanueva, APRN - CNP    Preoperative Diagnosis:  Screen for colon cancer [Z12.11]    Postoperative Diagnosis:  See impression    Anesthesia: Anesthesia: Moderate Sedation  Sedation: Versed 9 mg IV, fentanyl 100 mcg IV, Benedryl 50mg IV  Start Time: 9:45  Stop Time: 10:05  ASA Class: 2  Mallampati: II (soft palate, uvula, fauces visible)    Indications: This is a 61y.o. year old male who presents today with screening for colon cancer. Procedure Details  Informed consent was obtained for the procedure, including sedation. Risks of perforation, hemorrhage, adverse drug reaction and aspiration were discussed. The patient was placed in the left lateral decubitus position. Based on the pre-procedure assessment, including review of the patient's medical history, medications, allergies, and review of systems, he had been deemed to be an appropriate candidate for sedation; he was therefore sedated with the medications listed below. The patient was monitored continuously with ECG tracing, pulse oximetry, blood pressure monitoring, and direct observations. rectal examination was performed. There were no external hemorrhoids, fissures or skin tags. The colonoscope was inserted into both the ostomy and the rectum and advanced under direct vision to the cecum, which was identified by the ileocecal valve and appendiceal orifice. The right colon was examined twice as this increased polyp detection especially if other right colon polyps, older age, male, or calles syndrome. When segments could not be distended with CO2 or air, it was filled/distended with water.   The quality of the colonic

## 2020-02-25 NOTE — TELEPHONE ENCOUNTER
----- Message from Francisco Martell MD sent at 2/25/2020 10:09 AM EST -----  This patient did not do enemas so I was not able to go from the rectum. I did go through his colostomy. He had solid stool there. He will need to be scheduled again for a flex sig.

## 2020-02-28 NOTE — TELEPHONE ENCOUNTER
Yes.  The CT won't do an adequate job of looking for colon polyps even with rectal contrast and I would advise it.

## 2020-03-12 ENCOUNTER — HOSPITAL ENCOUNTER (OUTPATIENT)
Age: 60
Setting detail: OUTPATIENT SURGERY
Discharge: HOME | End: 2020-03-12
Attending: INTERNAL MEDICINE | Admitting: INTERNAL MEDICINE
Payer: MEDICARE

## 2020-03-12 VITALS
RESPIRATION RATE: 14 BRPM | TEMPERATURE: 98 F | WEIGHT: 200 LBS | DIASTOLIC BLOOD PRESSURE: 77 MMHG | HEART RATE: 54 BPM | HEIGHT: 70 IN | BODY MASS INDEX: 28.63 KG/M2 | OXYGEN SATURATION: 96 % | SYSTOLIC BLOOD PRESSURE: 124 MMHG

## 2020-03-12 PROCEDURE — G0121 COLON CA SCRN NOT HI RSK IND: HCPCS | Performed by: INTERNAL MEDICINE

## 2020-03-12 PROCEDURE — 2709999900 HC NON-CHARGEABLE SUPPLY: Performed by: INTERNAL MEDICINE

## 2020-03-12 PROCEDURE — 99153 MOD SED SAME PHYS/QHP EA: CPT | Performed by: INTERNAL MEDICINE

## 2020-03-12 PROCEDURE — 7100000010 HC PHASE II RECOVERY - FIRST 15 MIN: Performed by: INTERNAL MEDICINE

## 2020-03-12 PROCEDURE — 99152 MOD SED SAME PHYS/QHP 5/>YRS: CPT | Performed by: INTERNAL MEDICINE

## 2020-03-12 PROCEDURE — 7100000011 HC PHASE II RECOVERY - ADDTL 15 MIN: Performed by: INTERNAL MEDICINE

## 2020-03-12 PROCEDURE — 6360000002 HC RX W HCPCS: Performed by: INTERNAL MEDICINE

## 2020-03-12 PROCEDURE — 3609008400 HC SIGMOIDOSCOPY DIAGNOSTIC: Performed by: INTERNAL MEDICINE

## 2020-03-12 RX ORDER — FENTANYL CITRATE 50 UG/ML
INJECTION, SOLUTION INTRAMUSCULAR; INTRAVENOUS PRN
Status: DISCONTINUED | OUTPATIENT
Start: 2020-03-12 | End: 2020-03-12 | Stop reason: ALTCHOICE

## 2020-03-12 RX ORDER — MIDAZOLAM HYDROCHLORIDE 5 MG/ML
INJECTION INTRAMUSCULAR; INTRAVENOUS PRN
Status: DISCONTINUED | OUTPATIENT
Start: 2020-03-12 | End: 2020-03-12 | Stop reason: ALTCHOICE

## 2020-03-12 RX ORDER — DIPHENHYDRAMINE HYDROCHLORIDE 50 MG/ML
INJECTION INTRAMUSCULAR; INTRAVENOUS PRN
Status: DISCONTINUED | OUTPATIENT
Start: 2020-03-12 | End: 2020-03-12 | Stop reason: ALTCHOICE

## 2020-03-12 ASSESSMENT — PAIN - FUNCTIONAL ASSESSMENT: PAIN_FUNCTIONAL_ASSESSMENT: 0-10

## 2020-03-12 ASSESSMENT — PAIN DESCRIPTION - DESCRIPTORS: DESCRIPTORS: SHARP;DULL

## 2020-03-12 ASSESSMENT — PAIN SCALES - GENERAL: PAINLEVEL_OUTOF10: 6

## 2020-03-12 NOTE — OP NOTE
[address] ,  [address] [facility]  Phone: [phone]  [facility],  [address] [place] [address], [address] [place] [address]  Phone: [phone]   FLW:[phone]    Flexible sigmoidoscopy Procedure Note    Patient: Lindsay Khalil  : 1960    Procedure: Flexible sigmoidoscopy    Date:  3/12/2020     Endoscopist:  Hiro Cee MD    Referring Physician:  NATALEE Cordova CNP    Preoperative Diagnosis:  Screen for colon cancer [Z12.11]    Postoperative Diagnosis:  See impression    Anesthesia: Anesthesia: Moderate Sedation  Sedation: Versed 7.5 mg IV, fentanyl 100 mcg IV, Benedryl 50mg IV  Start Time: 11:55  Stop Time: 12:10  ASA Class: 2  Mallampati: II (soft palate, uvula, fauces visible)    Indications: This is a 61y.o. year old male who presents today with screening for colon cancer. Procedure Details  Informed consent was obtained for the procedure, including sedation. Risks of perforation, hemorrhage, adverse drug reaction and aspiration were discussed. The patient was placed in the left lateral decubitus position. Based on the pre-procedure assessment, including review of the patient's medical history, medications, allergies, and review of systems, he had been deemed to be an appropriate candidate for sedation; he was therefore sedated with the medications listed below. The patient was monitored continuously with ECG tracing, pulse oximetry, blood pressure monitoring, and direct observations. rectal examination was performed. There were no external hemorrhoids, fissures or skin tags. The colonoscope was inserted into the rectum and advanced under direct vision distal flex sig, which was identified. The right colon was examined twice as this increases polyp detection especially if other right colon polyps, older age, male, or calles syndrome.   When segments could not be distended with CO2 or air, it was filled/distended with

## 2020-03-12 NOTE — H&P
[address] ,  [address] [facility]  Phone: [phone] [facility] [address], [address] [address], [address] [place] [address]  Phone: [phone]    HPI     Thank you NATALEE Huber CNP for asking me to see Marcelle Ramos in consultation. He is a Single [1] White [1] 61 y.o. . male seen independently who presents with the following GI complaints:    Marcelle Ramos presents for screening flexible sigmoidoscopy. There is no history of colon polyps or cancer. There is no family history of colon polyps or cancer. He is no experiencing any symptoms presently. Last Encounter Reviewed:   Pertinent PMH, FH, SH is reviewed below.   Last EGD: none  Last Colonoscopy: 2/25/2020 diverticulosis from colostomy end, barium and stool in the rectum    No components found for: [facility]     PAST MEDICAL HISTORY     Past Medical History:   Diagnosis Date    Abdominal pain     Acquired hypothyroidism 11/3/2016    ADHD (attention deficit hyperactivity disorder)     Anxiety     Arthritis     Back pain     Chronic pain syndrome     DDD (degenerative disc disease), cervical     DDD (degenerative disc disease), lumbar     Depression     ED (erectile dysfunction) 10/8/2014    FHx: migraine headaches     Hematuria     History of pulmonary embolism     HTN (hypertension) 2/8/2012    Joint prosthesis complication (HCC)     Lumbar radiculitis     Narcotic abuse ([facility] [address] 3/20/2018    Personal history of PE (pulmonary embolism) 2/8/2012    Right shoulder pain     Rotator cuff syndrome of right shoulder     right shoulder rotator cuff release     FAMILY HISTORY     Family History   Problem Relation Age of Onset    Heart Disease Mother     Heart Disease Father         had bypass    Parkinsonism Father     Asthma Sister     Brain Cancer Brother     Asthma Son      SOCIAL HISTORY     Social History     Socioeconomic History    Marital of adhesions    JOINT REPLACEMENT      KNEE ARTHROPLASTY  [id number]    right total    KNEE ARTHROSCOPY      left    OTHER SURGICAL HISTORY  11/21/12    Biopsy of Lesion in Colostomy    REVISION COLOSTOMY Left 3/13/14    Removal of polyps    ROTATOR CUFF REPAIR      right    TONSILLECTOMY      TOTAL KNEE ARTHROPLASTY      TOTAL KNEE ARTHROPLASTY Left 11/30/2016     CURRENT MEDICATIONS   (This list may include medications prescribed during this encounter as epic can not insert only the list prior to this encounter.)  No current facility-administered medications on file prior to encounter. Current Outpatient Medications on File Prior to Encounter   Medication Sig Dispense Refill    amLODIPine (NORVASC) 10 MG tablet TAKE 1 TABLET (10 MG) BY MOUTH EVERY DAY 30 tablet 0    losartan (COZAAR) 100 MG tablet TAKE 1 TABLET (100 MG) BY MOUTH EVERY DAY 30 tablet 0    levothyroxine (SYNTHROID) 25 MCG tablet TAKE 1 TABLET (25 MCG) BY MOUTH EVERY DAY 30 tablet 2    ibuprofen (ADVIL;MOTRIN) 800 MG tablet TAKE 1 TABLET BY MOUTH EVERY 6 HOURS AS NEEDED FOR PAIN 120 tablet 3    cyclobenzaprine (FLEXERIL) 10 MG tablet TAKE 1 TABLET BY MOUTH NIGHTLY AS NEEDED FOR MUSCLE SPASMS 10 tablet 0    pregabalin (LYRICA) 75 MG capsule One tab hs 1 week, 2 tabs hs 1 week, 1 tab am 2 tabs pm 60 capsule 0    ondansetron (ZOFRAN-ODT) 4 MG disintegrating tablet Take 1 tablet by mouth every 8 hours as needed for Nausea 20 tablet 3    buPROPion (WELLBUTRIN XL) 300 MG XL tablet Take 1 tablet by mouth every morning.  30 tablet 3    amphetamine-dextroamphetamine (ADDERALL) 30 MG tablet Take 60 mg by mouth 2 times daily Two 30 mg tabs bid      alprazolam (XANAX) 0.5 MG tablet Take 1 mg by mouth nightly as needed for Sleep Takes three nightly       ALLERGIES     Allergies   Allergen Reactions    Morphine Sulfate Nausea Only     Causes nightmares     IMMUNIZATIONS     Immunization History   Administered Date(s) Administered    Influenza oriented x 3, Normal motor function, Normal sensory function, No focal deficits noted.    RADIOLOGY/PROCEDURES     Reviewed per [address] reviewed per epic    FOLLOWUP     Screening Colonoscopy          [facility] 3/12/20 11:49 AM EDT    CC:  Erica Jones, NATALEE - CNP

## 2020-03-18 RX ORDER — LOSARTAN POTASSIUM 100 MG/1
TABLET ORAL
Qty: 30 TABLET | Refills: 0 | Status: SHIPPED | OUTPATIENT
Start: 2020-03-18 | End: 2020-04-16

## 2020-03-18 RX ORDER — AMLODIPINE BESYLATE 10 MG/1
TABLET ORAL
Qty: 30 TABLET | Refills: 0 | Status: SHIPPED | OUTPATIENT
Start: 2020-03-18 | End: 2020-04-16

## 2020-03-25 ENCOUNTER — TELEPHONE (OUTPATIENT)
Dept: SURGERY | Age: 60
End: 2020-03-25

## 2020-03-25 NOTE — TELEPHONE ENCOUNTER
Please call patient to see what question he has regarding his recent flex sig? I did recommend a barium enema which does not look like it has been done.

## 2020-03-26 NOTE — TELEPHONE ENCOUNTER
The tests are a little different. A CT is more of a static picture whereas barium studies are generally more functional as they are often taking pictures during contrast administration. Ok hold on barium enema for now.   I will defer to Dr. Lilian Harrison who is going to eventually see him back for possible colostomy reversal.

## 2020-03-30 ENCOUNTER — TELEPHONE (OUTPATIENT)
Dept: SURGERY | Age: 60
End: 2020-03-30

## 2020-03-31 NOTE — TELEPHONE ENCOUNTER
I am not prescribing anything for pain, as I did not operate on him and he does not have any inflammatory process on CT scan. He needs to talk to his PCP if he has chronic pain issues.     We can do a video visit to discuss overall plan, but his case will be undoubtedly be put off until after COVID-19 pandemic

## 2020-04-16 RX ORDER — AMLODIPINE BESYLATE 10 MG/1
TABLET ORAL
Qty: 30 TABLET | Refills: 0 | Status: SHIPPED | OUTPATIENT
Start: 2020-04-16 | End: 2020-05-05

## 2020-04-16 RX ORDER — LOSARTAN POTASSIUM 100 MG/1
TABLET ORAL
Qty: 30 TABLET | Refills: 0 | Status: SHIPPED | OUTPATIENT
Start: 2020-04-16 | End: 2020-05-05

## 2020-04-16 RX ORDER — LEVOTHYROXINE SODIUM 0.03 MG/1
TABLET ORAL
Qty: 30 TABLET | Refills: 2 | Status: SHIPPED | OUTPATIENT
Start: 2020-04-16 | End: 2020-07-13

## 2020-04-30 ENCOUNTER — VIRTUAL VISIT (OUTPATIENT)
Dept: FAMILY MEDICINE CLINIC | Age: 60
End: 2020-04-30
Payer: MEDICARE

## 2020-04-30 ENCOUNTER — TELEPHONE (OUTPATIENT)
Dept: FAMILY MEDICINE CLINIC | Age: 60
End: 2020-04-30

## 2020-04-30 PROCEDURE — 3017F COLORECTAL CA SCREEN DOC REV: CPT | Performed by: NURSE PRACTITIONER

## 2020-04-30 PROCEDURE — G8427 DOCREV CUR MEDS BY ELIG CLIN: HCPCS | Performed by: NURSE PRACTITIONER

## 2020-04-30 PROCEDURE — 99213 OFFICE O/P EST LOW 20 MIN: CPT | Performed by: NURSE PRACTITIONER

## 2020-04-30 ASSESSMENT — ENCOUNTER SYMPTOMS
NAUSEA: 1
ABDOMINAL PAIN: 1
VOMITING: 0

## 2020-04-30 NOTE — PROGRESS NOTES
XL) 300 MG XL tablet Take 1 tablet by mouth every morning. Yes Emani Damico MD   amphetamine-dextroamphetamine (ADDERALL) 30 MG tablet Take 60 mg by mouth 2 times daily Two 30 mg tabs bid Yes Historical Provider, MD   alprazolam (XANAX) 0.5 MG tablet Take 1 mg by mouth nightly as needed for Sleep Takes three nightly Yes Historical Provider, MD       Social History     Tobacco Use    Smoking status: Former Smoker     Packs/day: 1.50     Years: 16.00     Pack years: 24.00     Last attempt to quit: 2000     Years since quittin.3    Smokeless tobacco: Never Used   Substance Use Topics    Alcohol use:  Yes     Alcohol/week: 0.0 standard drinks     Comment: occas    Drug use: No     Comment: has medical MJ card- has not been using that        Allergies   Allergen Reactions    Morphine Sulfate Nausea Only     Causes nightmares   ,   Past Medical History:   Diagnosis Date    Abdominal pain     Acquired hypothyroidism 11/3/2016    ADHD (attention deficit hyperactivity disorder)     Anxiety     Arthritis     Back pain     Chronic pain syndrome     DDD (degenerative disc disease), cervical     DDD (degenerative disc disease), lumbar     Depression     ED (erectile dysfunction) 10/8/2014    FHx: migraine headaches     Hematuria     History of pulmonary embolism     HTN (hypertension) 2012    Joint prosthesis complication (HCC)     Lumbar radiculitis     Narcotic abuse ([address] 3/20/2018    Personal history of PE (pulmonary embolism) 2012    Right shoulder pain     Rotator cuff syndrome of right shoulder     right shoulder rotator cuff release   ,   Past Surgical History:   Procedure Laterality Date    ACHILLES TENDON SURGERY      right    APPENDECTOMY      BLADDER SURGERY      fistula x2    CARPAL TUNNEL RELEASE  12    Release of Right Transverse Carpal Tunnel Ligament    COLON SURGERY      COLONOSCOPY N/A 2020    COLON (9:00) performed by Yolis Huertas MD at [facility]    COLOSTOMY      CYST REMOVAL      CYSTOSCOPY  3/13/14    HERNIA REPAIR  14    lysis of adhesions    JOINT REPLACEMENT      KNEE ARTHROPLASTY  [id number]    right total    KNEE ARTHROSCOPY      left    OTHER SURGICAL HISTORY  12    Biopsy of Lesion in Colostomy    REVISION COLOSTOMY Left 3/13/14    Removal of polyps    ROTATOR CUFF REPAIR      right    SIGMOIDOSCOPY N/A 3/12/2020    SIGMOIDOSCOPY DIAGNOSTIC FLEXIBLE (10:20) performed by Raymond Nuñez MD at [facility] [address] 2016   ,   Social History     Tobacco Use    Smoking status: Former Smoker     Packs/day: 1.50     Years: 16.00     Pack years: 24.00     Last attempt to quit: 2000     Years since quittin.3    Smokeless tobacco: Never Used   Substance Use Topics    Alcohol use: Yes     Alcohol/week: 0.0 standard drinks     Comment: occas    Drug use: No     Comment: has medical MJ card- has not been using that   ,   Family History   Problem Relation Age of Onset    Heart Disease Mother     Heart Disease Father         had bypass    Parkinsonism Father     Asthma Sister     Brain Cancer Brother     Asthma Son        PHYSICAL EXAMINATION:  [ INSTRUCTIONS:  \"[x]\" Indicates a positive item  \"[]\" Indicates a negative item  -- DELETE ALL ITEMS NOT EXAMINED]  Vital Signs: (As obtained by patient/caregiver or practitioner observation)    Blood pressure-  Heart rate-    Respiratory rate-    Temperature-  Pulse oximetry-     Constitutional: [x] Appears well-developed and well-nourished [x] No apparent distress      [] Abnormal-   Mental status  [x] Alert and awake  [x] Oriented to person/place/time [x]Able to follow commands      Eyes:  EOM    []  Normal  [] Abnormal-  Sclera  [x]  Normal  [] Abnormal -         Discharge []  None visible  [] Abnormal -    HENT:   [x] Normocephalic, atraumatic.   [] Abnormal   [] Mouth/Throat: Mucous membranes are moist.     External Ears [x] Normal  [] Abnormal-     Neck: [x] No visualized mass     Pulmonary/Chest: [x] Respiratory effort normal.  [x] No visualized signs of difficulty breathing or respiratory distress        [] Abnormal-      Musculoskeletal:   [] Normal gait with no signs of ataxia         [x] Normal range of motion of neck        [] Abnormal-       Neurological:        [x] No Facial Asymmetry (Cranial nerve 7 motor function) (limited exam to video visit)          [x] No gaze palsy        [] Abnormal-         Skin:        [x] No significant exanthematous lesions or discoloration noted on facial skin         [] Abnormal-            Psychiatric:       [x] Normal Affect [x] No Hallucinations        [] Abnormal-     Other pertinent observable physical exam findings-     ASSESSMENT/PLAN:  1. Left lower quadrant abdominal pain  Patient given Dr. Denisha godwin   - External Referral To Pain Clinic      No follow-ups on file. Alfonzo Tello is a 61 y.o. male being evaluated by a Virtual Visit (video visit) encounter to address concerns as mentioned above. A caregiver was present when appropriate. Due to this being a TeleHealth encounter (During [facility] public health emergency), evaluation of the following organ systems was limited: Vitals/Constitutional/EENT/Resp/CV/GI//MS/Neuro/Skin/Heme-Lymph-Imm. Pursuant to the emergency declaration under the [facility], [address] [place] [address] authority and the [employer] Act, this Virtual Visit was conducted with patient's (and/or legal guardian's) consent, to reduce the patient's risk of exposure to COVID-19 and provide necessary medical care. The patient (and/or legal guardian) has also been advised to contact this office for worsening conditions or problems, and seek emergency medical treatment and/or call 911 if deemed necessary.      Patient

## 2020-05-05 RX ORDER — IBUPROFEN 800 MG/1
TABLET ORAL
Qty: 120 TABLET | Refills: 3 | Status: SHIPPED | OUTPATIENT
Start: 2020-05-05 | End: 2020-09-14

## 2020-05-05 RX ORDER — LOSARTAN POTASSIUM 100 MG/1
TABLET ORAL
Qty: 30 TABLET | Refills: 0 | Status: SHIPPED | OUTPATIENT
Start: 2020-05-05 | End: 2020-06-15

## 2020-05-05 RX ORDER — AMLODIPINE BESYLATE 10 MG/1
TABLET ORAL
Qty: 30 TABLET | Refills: 0 | Status: SHIPPED | OUTPATIENT
Start: 2020-05-05 | End: 2020-06-15

## 2020-05-15 ENCOUNTER — TELEPHONE (OUTPATIENT)
Dept: FAMILY MEDICINE CLINIC | Age: 60
End: 2020-05-15

## 2020-06-10 ENCOUNTER — TELEPHONE (OUTPATIENT)
Dept: FAMILY MEDICINE CLINIC | Age: 60
End: 2020-06-10

## 2020-06-15 RX ORDER — LOSARTAN POTASSIUM 100 MG/1
TABLET ORAL
Qty: 30 TABLET | Refills: 0 | Status: SHIPPED | OUTPATIENT
Start: 2020-06-15 | End: 2020-07-13

## 2020-06-15 RX ORDER — AMLODIPINE BESYLATE 10 MG/1
TABLET ORAL
Qty: 30 TABLET | Refills: 0 | Status: SHIPPED | OUTPATIENT
Start: 2020-06-15 | End: 2020-07-13

## 2020-07-13 RX ORDER — AMLODIPINE BESYLATE 10 MG/1
TABLET ORAL
Qty: 30 TABLET | Refills: 0 | Status: SHIPPED | OUTPATIENT
Start: 2020-07-13 | End: 2020-08-13

## 2020-07-13 RX ORDER — LOSARTAN POTASSIUM 100 MG/1
TABLET ORAL
Qty: 30 TABLET | Refills: 0 | Status: SHIPPED | OUTPATIENT
Start: 2020-07-13 | End: 2020-08-13

## 2020-07-13 RX ORDER — LEVOTHYROXINE SODIUM 0.03 MG/1
TABLET ORAL
Qty: 30 TABLET | Refills: 2 | Status: SHIPPED | OUTPATIENT
Start: 2020-07-13 | End: 2020-10-12

## 2020-09-14 RX ORDER — IBUPROFEN 800 MG/1
TABLET ORAL
Qty: 120 TABLET | Refills: 3 | Status: SHIPPED | OUTPATIENT
Start: 2020-09-14 | End: 2021-01-11

## 2020-10-13 ENCOUNTER — TELEPHONE (OUTPATIENT)
Dept: FAMILY MEDICINE CLINIC | Age: 60
End: 2020-10-13

## 2020-10-26 ENCOUNTER — OFFICE VISIT (OUTPATIENT)
Dept: FAMILY MEDICINE CLINIC | Age: 60
End: 2020-10-26
Payer: MEDICARE

## 2020-10-26 VITALS
HEART RATE: 52 BPM | BODY MASS INDEX: 27.92 KG/M2 | DIASTOLIC BLOOD PRESSURE: 90 MMHG | OXYGEN SATURATION: 94 % | WEIGHT: 195 LBS | HEIGHT: 70 IN | SYSTOLIC BLOOD PRESSURE: 158 MMHG | TEMPERATURE: 98.4 F

## 2020-10-26 LAB
A/G RATIO: 1.9 (ref 1.1–2.2)
ALBUMIN SERPL-MCNC: 4.4 G/DL (ref 3.4–5)
ALP BLD-CCNC: 98 U/L (ref 40–129)
ALT SERPL-CCNC: 7 U/L (ref 10–40)
ANION GAP SERPL CALCULATED.3IONS-SCNC: 9 MMOL/L (ref 3–16)
AST SERPL-CCNC: 9 U/L (ref 15–37)
BASOPHILS ABSOLUTE: 0 K/UL (ref 0–0.2)
BASOPHILS RELATIVE PERCENT: 0.4 %
BILIRUB SERPL-MCNC: 0.4 MG/DL (ref 0–1)
BUN BLDV-MCNC: 14 MG/DL (ref 7–20)
CALCIUM SERPL-MCNC: 9 MG/DL (ref 8.3–10.6)
CHLORIDE BLD-SCNC: 103 MMOL/L (ref 99–110)
CHOLESTEROL, TOTAL: 171 MG/DL (ref 0–199)
CO2: 29 MMOL/L (ref 21–32)
CREAT SERPL-MCNC: 0.8 MG/DL (ref 0.8–1.3)
EOSINOPHILS ABSOLUTE: 0.1 K/UL (ref 0–0.6)
EOSINOPHILS RELATIVE PERCENT: 3 %
GFR AFRICAN AMERICAN: >60
GFR NON-AFRICAN AMERICAN: >60
GLOBULIN: 2.3 G/DL
GLUCOSE BLD-MCNC: 100 MG/DL (ref 70–99)
HCT VFR BLD CALC: 45.2 % (ref 40.5–52.5)
HDLC SERPL-MCNC: 45 MG/DL (ref 40–60)
HEMOGLOBIN: 15.3 G/DL (ref 13.5–17.5)
LDL CHOLESTEROL CALCULATED: 95 MG/DL
LYMPHOCYTES ABSOLUTE: 1.1 K/UL (ref 1–5.1)
LYMPHOCYTES RELATIVE PERCENT: 23 %
MCH RBC QN AUTO: 30.7 PG (ref 26–34)
MCHC RBC AUTO-ENTMCNC: 33.9 G/DL (ref 31–36)
MCV RBC AUTO: 90.5 FL (ref 80–100)
MONOCYTES ABSOLUTE: 0.5 K/UL (ref 0–1.3)
MONOCYTES RELATIVE PERCENT: 11.5 %
NEUTROPHILS ABSOLUTE: 2.9 K/UL (ref 1.7–7.7)
NEUTROPHILS RELATIVE PERCENT: 62.1 %
PDW BLD-RTO: 14.1 % (ref 12.4–15.4)
PLATELET # BLD: 232 K/UL (ref 135–450)
PMV BLD AUTO: 8.5 FL (ref 5–10.5)
POTASSIUM SERPL-SCNC: 4.6 MMOL/L (ref 3.5–5.1)
RBC # BLD: 4.99 M/UL (ref 4.2–5.9)
SODIUM BLD-SCNC: 141 MMOL/L (ref 136–145)
T4 FREE: 1.2 NG/DL (ref 0.9–1.8)
TOTAL PROTEIN: 6.7 G/DL (ref 6.4–8.2)
TRIGL SERPL-MCNC: 155 MG/DL (ref 0–150)
TSH SERPL DL<=0.05 MIU/L-ACNC: 1.37 UIU/ML (ref 0.27–4.2)
VLDLC SERPL CALC-MCNC: 31 MG/DL
WBC # BLD: 4.6 K/UL (ref 4–11)

## 2020-10-26 PROCEDURE — G0438 PPPS, INITIAL VISIT: HCPCS | Performed by: NURSE PRACTITIONER

## 2020-10-26 PROCEDURE — G8431 POS CLIN DEPRES SCRN F/U DOC: HCPCS | Performed by: NURSE PRACTITIONER

## 2020-10-26 PROCEDURE — 3017F COLORECTAL CA SCREEN DOC REV: CPT | Performed by: NURSE PRACTITIONER

## 2020-10-26 PROCEDURE — G8484 FLU IMMUNIZE NO ADMIN: HCPCS | Performed by: NURSE PRACTITIONER

## 2020-10-26 PROCEDURE — 36415 COLL VENOUS BLD VENIPUNCTURE: CPT | Performed by: NURSE PRACTITIONER

## 2020-10-26 RX ORDER — TAMSULOSIN HYDROCHLORIDE 0.4 MG/1
1 CAPSULE ORAL DAILY
COMMUNITY
Start: 2020-10-13

## 2020-10-26 RX ORDER — VERAPAMIL HYDROCHLORIDE 180 MG/1
180 CAPSULE, EXTENDED RELEASE ORAL NIGHTLY
Qty: 30 CAPSULE | Refills: 3 | Status: SHIPPED | OUTPATIENT
Start: 2020-10-26 | End: 2021-03-05

## 2020-10-26 RX ORDER — DOXEPIN HYDROCHLORIDE 50 MG/1
2 CAPSULE ORAL NIGHTLY
COMMUNITY
Start: 2020-08-14 | End: 2022-01-24

## 2020-10-26 ASSESSMENT — PATIENT HEALTH QUESTIONNAIRE - PHQ9
SUM OF ALL RESPONSES TO PHQ9 QUESTIONS 1 & 2: 5
5. POOR APPETITE OR OVEREATING: 3
3. TROUBLE FALLING OR STAYING ASLEEP: 3
6. FEELING BAD ABOUT YOURSELF - OR THAT YOU ARE A FAILURE OR HAVE LET YOURSELF OR YOUR FAMILY DOWN: 1
SUM OF ALL RESPONSES TO PHQ QUESTIONS 1-9: 15
2. FEELING DOWN, DEPRESSED OR HOPELESS: 2
10. IF YOU CHECKED OFF ANY PROBLEMS, HOW DIFFICULT HAVE THESE PROBLEMS MADE IT FOR YOU TO DO YOUR WORK, TAKE CARE OF THINGS AT HOME, OR GET ALONG WITH OTHER PEOPLE: 1
8. MOVING OR SPEAKING SO SLOWLY THAT OTHER PEOPLE COULD HAVE NOTICED. OR THE OPPOSITE, BEING SO FIGETY OR RESTLESS THAT YOU HAVE BEEN MOVING AROUND A LOT MORE THAN USUAL: 0
7. TROUBLE CONCENTRATING ON THINGS, SUCH AS READING THE NEWSPAPER OR WATCHING TELEVISION: 0
SUM OF ALL RESPONSES TO PHQ QUESTIONS 1-9: 15
9. THOUGHTS THAT YOU WOULD BE BETTER OFF DEAD, OR OF HURTING YOURSELF: 0
4. FEELING TIRED OR HAVING LITTLE ENERGY: 3
SUM OF ALL RESPONSES TO PHQ QUESTIONS 1-9: 15
1. LITTLE INTEREST OR PLEASURE IN DOING THINGS: 3

## 2020-10-26 ASSESSMENT — ENCOUNTER SYMPTOMS
CHEST TIGHTNESS: 1
VOMITING: 0
NAUSEA: 0
COUGH: 0
COLOR CHANGE: 0
WHEEZING: 0
BACK PAIN: 1
SHORTNESS OF BREATH: 0
ABDOMINAL PAIN: 1
SORE THROAT: 0

## 2020-10-26 ASSESSMENT — LIFESTYLE VARIABLES: HOW OFTEN DO YOU HAVE A DRINK CONTAINING ALCOHOL: 0

## 2020-10-26 NOTE — PROGRESS NOTES
(SYNTHROID) 25 MCG tablet TAKE 1 TABLET (25 MCG) BY MOUTH EVERY DAY 30 tablet 0    ibuprofen (ADVIL;MOTRIN) 800 MG tablet TAKE 1 TABLET BY MOUTH EVERY 6 HOURS AS NEEDED FOR PAIN WITH FOOD 120 tablet 3    losartan (COZAAR) 100 MG tablet TAKE 1 TABLET (100 MG) BY MOUTH EVERY DAY 30 tablet 5    ondansetron (ZOFRAN-ODT) 4 MG disintegrating tablet Take 1 tablet by mouth every 8 hours as needed for Nausea 20 tablet 3    amphetamine-dextroamphetamine (ADDERALL) 30 MG tablet Take 60 mg by mouth 2 times daily Two 30 mg tabs bid      alprazolam (XANAX) 0.5 MG tablet Take 1 mg by mouth nightly as needed for Sleep Takes three nightly      tamsulosin (FLOMAX) 0.4 MG capsule Take 1 capsule by mouth daily       No current facility-administered medications for this visit. The patient's past medical history, past surgical history, family history, medications, and allergies were all reviewed and updated at appropriate today. Review of Systems   Constitutional: Negative for chills and fever. HENT: Negative for congestion and sore throat. Eyes: Positive for visual disturbance. Respiratory: Positive for chest tightness. Negative for cough, shortness of breath and wheezing. Cardiovascular: Negative for chest pain and palpitations. Gastrointestinal: Positive for abdominal pain. Negative for nausea and vomiting. Has colostomy   Genitourinary: Negative for dysuria, frequency and urgency. Musculoskeletal: Positive for back pain and neck pain (DDD). Skin: Negative for color change and rash. Allergic/Immunologic: Negative for food allergies. Neurological: Positive for dizziness (on and off- more with vision from needing new glasses- sometimes when he gets up has some dizziness). Negative for headaches. Hematological: Bruises/bleeds easily. Psychiatric/Behavioral: Positive for dysphoric mood (sees psychiatrist- every 3 months- sees therapist once a month) and sleep disturbance.  Negative for suicidal ideas. The patient is nervous/anxious (anxiety- xanax PRN). Physical Exam    Assessment:  Encounter Diagnoses   Name Primary?  Essential hypertension Yes    Narcotic abuse ([facility] [address]     Mood disorder ([facility]        Plan:  1. Essential hypertension  ***  - verapamil (VERELAN) 180 MG extended release capsule; Take 1 capsule by mouth nightly  Dispense: 30 capsule; Refill: 3    2. Narcotic abuse (HCC)  ***    3. Mood disorder (HCC)  ***            No follow-ups on file.

## 2020-10-26 NOTE — PATIENT INSTRUCTIONS
Personalized Preventive Plan for Mireya Ray - 10/26/2020  Medicare offers a range of preventive health benefits. Some of the tests and screenings are paid in full while other may be subject to a deductible, co-insurance, and/or copay. Some of these benefits include a comprehensive review of your medical history including lifestyle, illnesses that may run in your family, and various assessments and screenings as appropriate. After reviewing your medical record and screening and assessments performed today your provider may have ordered immunizations, labs, imaging, and/or referrals for you. A list of these orders (if applicable) as well as your Preventive Care list are included within your After Visit Summary for your review. Other Preventive Recommendations:    · A preventive eye exam performed by an eye specialist is recommended every 1-2 years to screen for glaucoma; cataracts, macular degeneration, and other eye disorders. · A preventive dental visit is recommended every 6 months. · Try to get at least 150 minutes of exercise per week or 10,000 steps per day on a pedometer . · Order or download the FREE \"Exercise & Physical Activity: Your Everyday Guide\" from [employer]. Call 5-914.993.1185 or search [employer] online. · You need 0936-8593 mg of calcium and 3633-5922 IU of vitamin D per day. It is possible to meet your calcium requirement with diet alone, but a vitamin D supplement is usually necessary to meet this goal.  · When exposed to the sun, use a sunscreen that protects against both UVA and UVB radiation with an SPF of 30 or greater. Reapply every 2 to 3 hours or after sweating, drying off with a towel, or swimming. · Always wear a seat belt when traveling in a car. Always wear a helmet when riding a bicycle or motorcycle.

## 2020-10-26 NOTE — PROGRESS NOTES
Medicare Annual Wellness Visit  Name: Betty Mohan Date: 10/26/2020   MRN: <> Sex: Male   Age: 61 y.o. Ethnicity: Non-/Non    : 1960 Race: Maikol Campbell is here for Annual Exam    Screenings for behavioral, psychosocial and functional/safety risks, and cognitive dysfunction are all negative except as indicated below. These results, as well as other patient data from the [address] form, are documented in Flowsheets linked to this Encounter. Allergies   Allergen Reactions    Morphine Sulfate Nausea Only     Causes nightmares         Prior to Visit Medications    Medication Sig Taking?  Authorizing Provider   doxepin (SINEQUAN) 50 MG capsule Take 2 capsules by mouth nightly Yes Historical Provider, MD   verapamil (VERELAN) 180 MG extended release capsule Take 1 capsule by mouth nightly Yes Yas Danger, APRN - CNP   levothyroxine (SYNTHROID) 25 MCG tablet TAKE 1 TABLET (25 MCG) BY MOUTH EVERY DAY Yes Beth Prather APRN - CNP   ibuprofen (ADVIL;MOTRIN) 800 MG tablet TAKE 1 TABLET BY MOUTH EVERY 6 HOURS AS NEEDED FOR PAIN WITH FOOD Yes Yas Danger, APRN - CNP   losartan (COZAAR) 100 MG tablet TAKE 1 TABLET (100 MG) BY MOUTH EVERY DAY Yes Yas Danger, APRN - CNP   ondansetron (ZOFRAN-ODT) 4 MG disintegrating tablet Take 1 tablet by mouth every 8 hours as needed for Nausea Yes Yas Danger, APRN - CNP   amphetamine-dextroamphetamine (ADDERALL) 30 MG tablet Take 60 mg by mouth 2 times daily Two 30 mg tabs bid Yes Historical Provider, MD   alprazolam (XANAX) 0.5 MG tablet Take 1 mg by mouth nightly as needed for Sleep Takes three nightly Yes Historical Provider, MD   tamsulosin (FLOMAX) 0.4 MG capsule Take 1 capsule by mouth daily  Historical Provider, MD         Past Medical History:   Diagnosis Date    Abdominal pain     Acquired hypothyroidism 11/3/2016    ADHD (attention deficit hyperactivity disorder)     Anxiety     Arthritis     Back pain     Chronic pain syndrome     DDD (degenerative disc disease), cervical     DDD (degenerative disc disease), lumbar     Depression     ED (erectile dysfunction) 10/8/2014    FHx: migraine headaches     Hematuria     History of pulmonary embolism     HTN (hypertension) 2/8/2012    Joint prosthesis complication (HCC)     Lumbar radiculitis     Narcotic abuse ([address] 3/20/2018    Personal history of PE (pulmonary embolism) 2/8/2012    Right shoulder pain     Rotator cuff syndrome of right shoulder     right shoulder rotator cuff release       Past Surgical History:   Procedure Laterality Date    ACHILLES TENDON SURGERY      right    APPENDECTOMY      BLADDER SURGERY      fistula x2    CARPAL TUNNEL RELEASE  11/20/12    Release of Right Transverse Carpal Tunnel Ligament    COLON SURGERY      COLONOSCOPY N/A 2/25/2020    COLON (9:00) performed by Valentin Perez MD at [address] [place] [address]  3/13/14    HERNIA REPAIR  11/20/14    lysis of adhesions    JOINT REPLACEMENT      KNEE ARTHROPLASTY  [id number]    right total    KNEE ARTHROSCOPY      left    OTHER SURGICAL HISTORY  11/21/12    Biopsy of Lesion in Colostomy    REVISION COLOSTOMY Left 3/13/14    Removal of polyps    ROTATOR CUFF REPAIR      right    SIGMOIDOSCOPY N/A 3/12/2020    SIGMOIDOSCOPY DIAGNOSTIC FLEXIBLE (10:20) performed by Valentin Perez MD at [facility] ARTHROPLASTY Left 11/30/2016         Family History   Problem Relation Age of Onset    Heart Disease Mother     Heart Disease Father         had bypass    Parkinsonism Father     Asthma Sister     Brain Cancer Brother     Asthma Son        CareTeam (Including outside providers/suppliers regularly involved in providing care):   Patient Care Team:  NATALEE Lyle - CNP as PCP - General (Nurse Practitioner Family)  Gavi Tineo NATALEE Hastings - CNP as PCP - [facility]  Freeman Feliz MD (Orthopedic Surgery)  Jason Doe MD (Orthopedic Surgery)  Carolyne Mcdonald as Physician Assistant (Orthopedic Surgery)    Wt Readings from Last 3 Encounters:   10/26/20 195 lb (88.5 kg)   03/12/20 200 lb (90.7 kg)   02/25/20 200 lb (90.7 kg)     Vitals:    10/26/20 0743 10/26/20 0804   BP: (!) 150/80 (!) 158/90   Site:  Left Upper Arm   Pulse: 52    Temp: 98.4 °F (36.9 °C)    TempSrc: Temporal    SpO2: 94%    Weight: 195 lb (88.5 kg)    Height: 5' 10\" (1.778 m)      Body mass index is 27.98 kg/m². Based upon direct observation of the patient, evaluation of cognition reveals recent and remote memory intact.     General Appearance: alert and oriented to person, place and time, well developed and well- nourished, in no acute distress  Skin: warm and dry, no rash or erythema  Head: normocephalic and atraumatic  Eyes: pupils equal, round, and reactive to light, extraocular eye movements intact, conjunctivae normal  ENT: tympanic membrane, external ear and ear canal normal bilaterally, nose without deformity, nasal mucosa and turbinates normal without polyps  Neck: supple and non-tender without mass, no thyromegaly or thyroid nodules, no cervical lymphadenopathy  Pulmonary/Chest: clear to auscultation bilaterally- no wheezes, rales or rhonchi, normal air movement, no respiratory distress  Cardiovascular: normal rate, regular rhythm, normal S1 and S2, no murmurs, rubs, clicks, or gallops, distal pulses intact, no carotid bruits  Abdomen: soft, non-tender, non-distended, normal bowel sounds, no masses or organomegaly  Extremities: no cyanosis, clubbing or edema  Musculoskeletal: normal range of motion, no joint swelling, deformity or tenderness  Neurologic: reflexes normal and symmetric, no cranial nerve deficit, gait, coordination and speech normal    Patient's complete Health Risk Assessment and screening values have been reviewed and are found in [address] [facility]. The following problems were reviewed today and where indicated follow up appointments were made and/or referrals ordered. Positive Risk Factor Screenings with Interventions:     Fall Risk:  Timed Up and Go Test > 12 seconds? (Complete if either Fall Risk answers are Yes): no  2 or more falls in past year?: (!) yes  Fall with injury in past year?: no  Fall Risk Interventions:    · Home safety tips provided    Depression:  PHQ-2 Score: 5  PHQ-9 Total Score: 15    Severity:1-4 = minimal depression, 5-9 = mild depression, 10-14 = moderate depression, 15-19 = moderately severe depression, 20-27 = severe depression  Depression Interventions:  · patient sees his psychiatrist    General Health and ACP:  General  In general, how would you say your health is?: Fair  In the past 7 days, have you experienced any of the following?  New or Increased Pain, New or Increased Fatigue, Loneliness, Social Isolation, Stress or Anger?: (!) New or Increased Pain, New or Increased Fatigue, Loneliness, Social Isolation, Stress, Anger  Do you get the social and emotional support that you need?: (!) No  Do you have a Living Will?: Yes  Advance Directives     Power of  Living Will ACP-Advance Directive ACP-Power of     Not on File Not on File Filed [facility] Risk Interventions:  · Loneliness: sees therapist   · For pain- is going to see pain management    Health Habits/Nutrition:  Health Habits/Nutrition  Do you exercise for at least 20 minutes 2-3 times per week?: Yes  Have you lost any weight without trying in the past 3 months?: (!) Yes  Do you eat fewer than 2 meals per day?: (!) Yes  Have you seen a dentist within the past year?: (!) No  Body mass index: (!) 27.98  Health Habits/Nutrition Interventions:  · Dental exam overdue:  patient encouraged to make appointment with his/her dentist    Hearing/Vision:  No exam data present  Hearing/Vision  Do you or your family notice any trouble with your hearing?: (!) Yes  Do you have difficulty driving, watching TV, or doing any of your daily activities because of your eyesight?: No  Have you had an eye exam within the past year?: Yes  Hearing/Vision Interventions:  · patient states he hears well    Safety:  Safety  Do you have working smoke detectors?: Yes  Have all throw rugs been removed or fastened?: Yes  Do you have non-slip mats or surfaces in all bathtubs/showers?: (!) No  Do all of your stairways have a railing or banister?: Yes  Are your doorways, halls and stairs free of clutter?: Yes  Do you always fasten your seatbelt when you are in a car?: Yes  Safety Interventions:  · shower and tub has rough surface    ADL:  ADLs  In the past 7 days, did you need help from others to perform any of the following everyday activities? Eating, dressing, grooming, bathing, toileting, or walking/balance?: None  In the past 7 days, did you need help from others to take care of any of the following?  Laundry, housekeeping, banking/finances, shopping, telephone use, food preparation, transportation, or taking medications?: [employer], Housekeeping, Laundry  ADL Interventions:  · Patient declines any further evaluation/treatment for this issue    Personalized Preventive Plan   Current Health Maintenance Status  Immunization History   Administered Date(s) Administered    Influenza Virus Vaccine 10/08/2014    Influenza, LOLY Hussein, (6 mo and older Fluzone, Flulaval, Fluarix and 3 yrs and older Afluria) 11/03/2016    Pneumococcal Polysaccharide ([id number]) 11/22/2014, 11/03/2016        Health Maintenance   Topic Date Due    Hepatitis C screen  1960    DTaP/Tdap/Td vaccine (1 - Tdap) 06/25/1979    Shingles Vaccine (1 of 2) 06/25/2010    Annual Wellness Visit (AWV)  06/19/2019    A1C test (Diabetic or Prediabetic)  08/29/2019    TSH testing  08/29/2019    Flu vaccine (1) 09/01/2020    Potassium monitoring  02/03/2021    Creatinine monitoring  02/03/2021    Lipid screen  08/29/2023    Colon cancer screen colonoscopy  02/25/2030    Hepatitis A vaccine  Aged Out    Hepatitis B vaccine  Aged Out    Hib vaccine  Aged Out    Meningococcal (ACWY) vaccine  Aged Out    Pneumococcal 0-64 years Vaccine  Aged Out    HIV screen  Discontinued     Recommendations for [employer] Due: see orders and patient instructions/AVS.  . Recommended screening schedule for the next 5-10 years is provided to the patient in written form: see Patient Instructions/AVS.    Blaire Calderaallegra was seen today for annual exam.    Diagnoses and all orders for this visit:    Essential hypertension  -     verapamil (VERELAN) 180 MG extended release capsule;  Take 1 capsule by mouth nightly    Narcotic abuse ([address]    Mood disorder ([address]    Positive depression screening  -     Positive Screen for Clinical Depression with a Documented Follow-up Plan     Routine general medical examination at a health care facility

## 2020-10-27 ENCOUNTER — TELEPHONE (OUTPATIENT)
Dept: FAMILY MEDICINE CLINIC | Age: 60
End: 2020-10-27

## 2020-10-27 NOTE — TELEPHONE ENCOUNTER
Karen Davila from pharmacy said they only have the   verapamil (VERELAN) 180 MG extended release capsule    In tablet  form no capsules available     Please call [phone]

## 2020-11-11 RX ORDER — LEVOTHYROXINE SODIUM 0.03 MG/1
TABLET ORAL
Qty: 30 TABLET | Refills: 0 | Status: SHIPPED | OUTPATIENT
Start: 2020-11-11 | End: 2020-12-10

## 2020-11-11 RX ORDER — LEVOTHYROXINE SODIUM 0.03 MG/1
TABLET ORAL
Qty: 30 TABLET | Refills: 0 | Status: SHIPPED | OUTPATIENT
Start: 2020-11-11 | End: 2021-12-01

## 2020-12-10 RX ORDER — LEVOTHYROXINE SODIUM 0.03 MG/1
TABLET ORAL
Qty: 30 TABLET | Refills: 0 | Status: SHIPPED | OUTPATIENT
Start: 2020-12-10 | End: 2021-01-11

## 2021-01-11 RX ORDER — IBUPROFEN 800 MG/1
TABLET ORAL
Qty: 120 TABLET | Refills: 3 | Status: SHIPPED | OUTPATIENT
Start: 2021-01-11 | End: 2021-04-06

## 2021-02-09 DIAGNOSIS — I10 ESSENTIAL HYPERTENSION: ICD-10-CM

## 2021-02-09 RX ORDER — LOSARTAN POTASSIUM 100 MG/1
TABLET ORAL
Qty: 30 TABLET | Refills: 5 | Status: SHIPPED | OUTPATIENT
Start: 2021-02-09 | End: 2021-07-06

## 2021-03-08 ENCOUNTER — TELEPHONE (OUTPATIENT)
Dept: FAMILY MEDICINE CLINIC | Age: 61
End: 2021-03-08

## 2021-03-08 NOTE — TELEPHONE ENCOUNTER
Pt said he had a colostomy years ago/said that they tried reversing it 3 - 4 times but he is having discharge now x started 2 months ago. Pt said he is not in any pain and does not feel like he needs to rush to ER but wants to see what a provider advises. He is aware that Carrol López is not in today. Pt said the discharge is more now and reddish last night and is now tan in color today. He said it is just more constant now and an aggravation. Please advise. Pt states he was seeing Megan Heller dr but he is no longer with [address] [facility].

## 2021-03-08 NOTE — TELEPHONE ENCOUNTER
Chart reviewed, rx completed per protocol.    I think he needs to follow up with GI. 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

## 2021-03-09 ENCOUNTER — OFFICE VISIT (OUTPATIENT)
Dept: GASTROENTEROLOGY | Age: 61
End: 2021-03-09
Payer: MEDICARE

## 2021-03-09 VITALS
HEIGHT: 70 IN | WEIGHT: 193.6 LBS | HEART RATE: 73 BPM | BODY MASS INDEX: 27.72 KG/M2 | DIASTOLIC BLOOD PRESSURE: 91 MMHG | TEMPERATURE: 97.5 F | SYSTOLIC BLOOD PRESSURE: 169 MMHG

## 2021-03-09 DIAGNOSIS — Z93.3 STATUS POST COLOSTOMY (HCC): Primary | ICD-10-CM

## 2021-03-09 DIAGNOSIS — R19.8 RECTAL DISCHARGE: ICD-10-CM

## 2021-03-09 PROCEDURE — G8484 FLU IMMUNIZE NO ADMIN: HCPCS | Performed by: INTERNAL MEDICINE

## 2021-03-09 PROCEDURE — 1036F TOBACCO NON-USER: CPT | Performed by: INTERNAL MEDICINE

## 2021-03-09 PROCEDURE — G8419 CALC BMI OUT NRM PARAM NOF/U: HCPCS | Performed by: INTERNAL MEDICINE

## 2021-03-09 PROCEDURE — 99214 OFFICE O/P EST MOD 30 MIN: CPT | Performed by: INTERNAL MEDICINE

## 2021-03-09 PROCEDURE — G8427 DOCREV CUR MEDS BY ELIG CLIN: HCPCS | Performed by: INTERNAL MEDICINE

## 2021-03-09 PROCEDURE — 3017F COLORECTAL CA SCREEN DOC REV: CPT | Performed by: INTERNAL MEDICINE

## 2021-03-09 NOTE — PROGRESS NOTES
[address]  [address] [facility]  Phone: [phone]   SKB:[phone]    CHIEF COMPLAINT     Chief Complaint   Patient presents with    Follow-up     leaking from rectum color changing         HPI     Prior patient of Dr Kong Larson now here to see me as Dr Kong Larson has left the practice. Davida Melo is a 61 y.o. male who presents for complaints of discharge per rectum - different colors sometimes pale and sometimes dark. Has been going on several weeks, used to be infrequent in the past but now appears worse. Scribes a sensation of fullness per rectum feels like there is something moving in and out in the rectal/anal area. Patient has left-sided end colostomy with a Mar's pouch. Notes from Dr. Kong Larson as well as Dr. Jasmeet Hirsch (Colorectal Surgery at [facility]) were reviewed - He has a complex surgical history. He had perforated sigmoid diverticulitis requiring open sigmoid resection and end colostomy creation (Mar's procedure) about 15 years ago. This was complicated by what sounds like drain placement and then a fistula to the bladder from staple line breakdown which required reoperation. He is also had multiple hernia repairs and colostomy revisions. 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 He is now seeing Dr. Jasmeet Hirsch for this issue. He has chronic lower abdominal pain  He says his colostomy is functioning okay and he empties the pouch out regularly without any problems, denies any blood in the ostomy bag  His main complaints currently are related to discharge per rectum as described above    Prior workup: Colonoscopy through colostomy 2/25/2020 with Dr Kong Larson - diverticulosis, mild in degree, involving the descending colon. There was 45cm from colostomy to cecum  Flexible sigmoidoscopy 3/12/2020 with Dr Kong Larson - large stool ball containing hair, barium present in the rectum, able to get around it but not far into the sigmoid due to a fixed sigmoid. Colonoscopy 2013 with Dr René Sanz of [address] [place] [address] -  Normal colonoscopy to the cecum, no evidence of any lesions. PAST MEDICAL HISTORY     Past Medical History:   Diagnosis Date    Abdominal pain     Acquired hypothyroidism 11/3/2016    ADHD (attention deficit hyperactivity disorder)     Anxiety     Arthritis     Back pain     Chronic pain syndrome     DDD (degenerative disc disease), cervical     DDD (degenerative disc disease), lumbar     Depression     ED (erectile dysfunction) 10/8/2014    FHx: migraine headaches     Hematuria     History of pulmonary embolism     HTN (hypertension) 2012    Joint prosthesis complication (HCC)     Lumbar radiculitis     Narcotic abuse ([address] 3/20/2018    Personal history of PE (pulmonary embolism) 2012    Right shoulder pain     Rotator cuff syndrome of right shoulder     right shoulder rotator cuff release     FAMILY HISTORY     Family History   Problem Relation Age of Onset    Heart Disease Mother     Heart Disease Father         had bypass    Parkinsonism Father     Asthma Sister     Brain Cancer Brother     Asthma Son      SOCIAL HISTORY     Social History     Socioeconomic History    Marital status: Single     Spouse name: Jenni Bobo Number of children: Not on file    Years of education: 15    Highest education level: Not on file   Occupational History    Occupation: disabled   Social Needs    Financial resource strain: Not on file    Food insecurity     Worry: Not on file     Inability: Not on file   [employer] needs     Medical: Not on file     Non-medical: Not on file   Tobacco Use    Smoking status: Former Smoker     Packs/day: 1.50     Years: 16.00     Pack years: 24.00     Quit date: 2000     Years since quittin.1    Smokeless tobacco: Never Used   Substance and Sexual Activity    Alcohol use:  Yes Alcohol/week: 0.0 standard drinks     Comment: occas    Drug use: No     Comment: has medical MJ card- has not been using that    Sexual activity: Yes     Partners: Female   Lifestyle    Physical activity     Days per week: Not on file     Minutes per session: Not on file    Stress: Not on file   Relationships    Social connections     Talks on phone: Not on file     Gets together: Not on file     Attends Congregation service: Not on file     Active member of club or organization: Not on file     Attends meetings of clubs or organizations: Not on file     Relationship status: Not on file    Intimate partner violence     Fear of current or ex partner: Not on file     Emotionally abused: Not on file     Physically abused: Not on file     Forced sexual activity: Not on file   Other Topics Concern    Not on file   Social History Narrative    Not on file     SURGICAL HISTORY     Past Surgical History:   Procedure Laterality Date    ACHILLES TENDON SURGERY      right    APPENDECTOMY      BLADDER SURGERY      fistula x2    CARPAL TUNNEL RELEASE  11/20/12    Release of Right Transverse Carpal Tunnel Ligament    COLON SURGERY      COLONOSCOPY N/A 2/25/2020    COLON (9:00) performed by Augusto Hernandez MD at [address] [place] [address]  3/13/14   [address] [facility]  11/20/14    lysis of adhesions    JOINT REPLACEMENT      KNEE ARTHROPLASTY  [id number]    right total    KNEE ARTHROSCOPY      left    OTHER SURGICAL HISTORY  11/21/12    Biopsy of Lesion in Colostomy    REVISION COLOSTOMY Left 3/13/14    Removal of polyps    ROTATOR CUFF REPAIR      right    SIGMOIDOSCOPY N/A 3/12/2020    SIGMOIDOSCOPY DIAGNOSTIC FLEXIBLE (10:20) performed by Augusto Hernandez MD at [facility]. [address] Left 11/30/2016     CURRENT MEDICATIONS   (This list may include medications prescribed during this encounter as epic can not insert only the list prior to this encounter.)  Current Outpatient Rx   Medication Sig Dispense Refill    verapamil (CALAN SR) 180 MG extended release tablet TAKE 1 TABLET (180 MG) BY MOUTH NIGHTLY 30 tablet 3    losartan (COZAAR) 100 MG tablet TAKE 1 TABLET (100 MG) BY MOUTH EVERY DAY 30 tablet 5    levothyroxine (SYNTHROID) 25 MCG tablet TAKE 1 TABLET (25 MCG) BY MOUTH EVERY DAY 30 tablet 5    ibuprofen (ADVIL;MOTRIN) 800 MG tablet TAKE 1 TABLET BY MOUTH EVERY 6 HOURS AS NEEDED FOR PAIN WITH FOOD 120 tablet 3    levothyroxine (SYNTHROID) 25 MCG tablet TAKE 1 TABLET (25 MCG) BY MOUTH EVERY DAY 30 tablet 0    doxepin (SINEQUAN) 50 MG capsule Take 2 capsules by mouth nightly      tamsulosin (FLOMAX) 0.4 MG capsule Take 1 capsule by mouth daily      ondansetron (ZOFRAN-ODT) 4 MG disintegrating tablet Take 1 tablet by mouth every 8 hours as needed for Nausea 20 tablet 3    amphetamine-dextroamphetamine (ADDERALL) 30 MG tablet Take 60 mg by mouth 2 times daily Two 30 mg tabs bid      alprazolam (XANAX) 0.5 MG tablet Take 1 mg by mouth nightly as needed for Sleep Takes three nightly          ALLERGIES     Allergies   Allergen Reactions    Morphine Sulfate Nausea Only     Causes nightmares       IMMUNIZATIONS     Immunization History   Administered Date(s) Administered    Influenza Virus Vaccine 10/08/2014    Influenza, Quadv, IM, (6 mo and older Fluzone, Flulaval, Fluarix and 3 yrs and older Afluria) 11/03/2016    Pneumococcal Polysaccharide ([id number]) 11/22/2014, 11/03/2016       REVIEW OF SYSTEMS     Constitutional: denies fever and unexpected weight change. HENT: Negative for ear pain, hearing loss and nosebleeds. Eyes: Negative for pain and visual disturbance. Respiratory: Negative for cough, shortness of breath and wheezing. Cardiovascular: Negative for chest pain, palpitations and leg swelling. Gastrointestinal: see HPI for details.   Endocrine: Negative for polydipsia, polyphagia and polyuria. Genitourinary: Negative for difficulty urinating, dysuria, hematuria and urgency. Musculoskeletal: Positive for arthralgias and back pain. Skin: Negative for pallor and rash. Allergic/Immunologic: Negative for environmental allergies and immunocompromised state. Neurological: Negative for seizures, syncope. Hematological: Negative for adenopathy. Does not bruise/bleed easily. Psychiatric/Behavioral: Negative for agitation, confusion, hallucinations. PHYSICAL EXAM   VITAL SIGNS: BP (!) 169/91   Pulse 73   Temp 97.5 °F (36.4 °C)   Ht 5' 10\" (1.778 m)   Wt 193 lb 9.6 oz (87.8 kg)   BMI 27.78 kg/m²   Wt Readings from Last 3 Encounters:   03/09/21 193 lb 9.6 oz (87.8 kg)   10/26/20 195 lb (88.5 kg)   03/12/20 200 lb (90.7 kg)     Gen: AAO3, NAD  HEENT: no pallor or icterus  Neck: supple, no adenopathy  RS: clear to auscultation bilaterally  CVS: S1S2 RRR, no murmurs  GI: soft, nontender, not distended, BS+, no hepatosplenomegaly  Ext: no edema or clubbing  Rectal exam: No external lesions, digital exam reveals hard ball of stool/debris in the rectum    CT ABDOMEN PELVIS W IV CONTRAST Additional Contrast? Oral and Rectal  Narrative: EXAMINATION:  CT OF THE ABDOMEN AND PELVIS WITH CONTRAST 2/3/2020 2:40 pm    TECHNIQUE:  CT of the abdomen and pelvis was performed with the administration of  intravenous contrast. Multiplanar reformatted images are provided for review. Dose modulation, iterative reconstruction, and/or weight based adjustment of  the mA/kV was utilized to reduce the radiation dose to as low as reasonably  achievable.     COMPARISON:  CT abdomen and pelvis march 21, 2018    HISTORY:  ORDERING SYSTEM PROVIDED HISTORY: Colostomy present [facility])  TECHNOLOGIST PROVIDED HISTORY:  Additional Contrast?->Oral and Rectal  Reason for exam:->eval colon and hernia  Reason for Exam: eval colon and hernia  Acuity: Unknown  Type of Exam: Ongoing  Relevant Medical/Surgical History: Colostomy present, Recurrent incisional  hernia    FINDINGS:  Lower Chest: No evidence of pneumonia or other acute findings. Organs: No acute abnormality of the organs of the abdomen. No evidence of  pancreatitis. No ureteral stone or hydronephrosis. No evidence of  pyelonephritis. Cyst versus hemangiomata again noted in the liver. Cysts  again noted in the kidneys measuring up to 1.8 cm in size. GI/Bowel: Rectal contrast was given in addition to oral contrast.  Unchanged  normal/expected appearance of the colostomy. No bowel obstruction or other  abnormality. Rectum and distal sigmoid to the resection site appears within  normal limits as well, and unchanged. Suture material associated with a  small bowel loop in the central abdomen again noted. Pelvis: No acute abnormality of the pelvis. No evidence of cystitis. Peritoneum/Retroperitoneum: A flattened fluid collection along the anterior  abdominal wall at the surgery site measuring up to 6 mm in thickness and 5 cm  in diameter is unchanged. No free fluid. No abnormality or significant  findings elsewhere. Bones/Soft Tissues: No fracture or other acute osseous process. Impression: Stable appearance of colectomy with colostomy, with normal/expected  postsurgical appearance. Unchanged 5 cm flattened fluid collection of the anterior abdominal wall  measures up to 6 mm in thickness. No evidence of recurrent incisional hernia. FINAL IMPRESSION     Patient with a complex surgical history of prior perforated diverticulitis leading to end colostomy and Mar's pouch about 15 years ago.   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   Digital exam confirms retained hard ball of debris in the rectum  Recommend Fleets enemas daily for couple of days and can do these intermittently after that  If this does not work would recommend seeing colorectal surgery for further input

## 2021-03-24 ENCOUNTER — OFFICE VISIT (OUTPATIENT)
Dept: SURGERY | Age: 61
End: 2021-03-24
Payer: MEDICARE

## 2021-03-24 VITALS
HEART RATE: 75 BPM | RESPIRATION RATE: 16 BRPM | WEIGHT: 194 LBS | SYSTOLIC BLOOD PRESSURE: 179 MMHG | HEIGHT: 70 IN | DIASTOLIC BLOOD PRESSURE: 99 MMHG | BODY MASS INDEX: 27.77 KG/M2 | TEMPERATURE: 97.9 F

## 2021-03-24 DIAGNOSIS — Z93.3 STATUS POST COLOSTOMY (HCC): ICD-10-CM

## 2021-03-24 DIAGNOSIS — K56.41 FECAL IMPACTION IN RECTUM (HCC): Primary | ICD-10-CM

## 2021-03-24 PROCEDURE — 1036F TOBACCO NON-USER: CPT | Performed by: SURGERY

## 2021-03-24 PROCEDURE — 3017F COLORECTAL CA SCREEN DOC REV: CPT | Performed by: SURGERY

## 2021-03-24 PROCEDURE — G8427 DOCREV CUR MEDS BY ELIG CLIN: HCPCS | Performed by: SURGERY

## 2021-03-24 PROCEDURE — G8484 FLU IMMUNIZE NO ADMIN: HCPCS | Performed by: SURGERY

## 2021-03-24 PROCEDURE — 99213 OFFICE O/P EST LOW 20 MIN: CPT | Performed by: SURGERY

## 2021-03-24 PROCEDURE — G8419 CALC BMI OUT NRM PARAM NOF/U: HCPCS | Performed by: SURGERY

## 2021-03-24 NOTE — Clinical Note
Will plan disimpaction under anesthesia if unable to pass this stool ball with fleets. Thanks!  Melody Malagon

## 2021-03-24 NOTE — PROGRESS NOTES
[address] [address] [address]  Dept: [phone]  Dept Fax: [phone]  Loc: [phone]    Visit Date: 3/24/2021    Lorie Kirkland is a 61 y.o. male who presents today for: Other (rectal mass )      HPI:       Lorie Kirkland is a 61 y.o. male who is actually known to me for colostomy and previous numerous surgeries resulting in large complex hernia. He saw me over a year ago as we started discussing potential colostomy reversal and redo hernia repair with the [facility]. Most recently he has had a hardness in his rectum and was found to have anticipated stool and mucus ball in his rectum that was unable to be removed with enemas.     Past Medical History:   Diagnosis Date    Abdominal pain     Acquired hypothyroidism 11/3/2016    ADHD (attention deficit hyperactivity disorder)     Anxiety     Arthritis     Back pain     Chronic pain syndrome     DDD (degenerative disc disease), cervical     DDD (degenerative disc disease), lumbar     Depression     ED (erectile dysfunction) 10/8/2014    FHx: migraine headaches     Hematuria     History of pulmonary embolism     HTN (hypertension) 2/8/2012    Joint prosthesis complication (HCC)     Lumbar radiculitis     Narcotic abuse ([address] 3/20/2018    Personal history of PE (pulmonary embolism) 2/8/2012    Right shoulder pain     Rotator cuff syndrome of right shoulder     right shoulder rotator cuff release     Past Surgical History:   Procedure Laterality Date    ACHILLES TENDON SURGERY      right    APPENDECTOMY      BLADDER SURGERY      fistula x2    CARPAL TUNNEL RELEASE  11/20/12    Release of Right Transverse Carpal Tunnel Ligament    COLON SURGERY      COLONOSCOPY N/A 2/25/2020    COLON (9:00) performed by Cecily Sr MD at [facility]  3/13/14    HERNIA REPAIR  11/20/14    lysis of adhesions  JOINT REPLACEMENT      KNEE ARTHROPLASTY  [id number]    right total    KNEE ARTHROSCOPY      left    OTHER SURGICAL HISTORY  11/21/12    Biopsy of Lesion in Colostomy    REVISION COLOSTOMY Left 3/13/14    Removal of polyps    ROTATOR CUFF REPAIR      right    SIGMOIDOSCOPY N/A 3/12/2020    SIGMOIDOSCOPY DIAGNOSTIC FLEXIBLE (10:20) [facility] Left 11/30/2016       Current Outpatient Medications:     verapamil (CALAN SR) 180 MG extended release tablet, TAKE 1 TABLET (180 MG) BY MOUTH NIGHTLY, Disp: 30 tablet, Rfl: 3    losartan (COZAAR) 100 MG tablet, TAKE 1 TABLET (100 MG) BY MOUTH EVERY DAY, Disp: 30 tablet, Rfl: 5    levothyroxine (SYNTHROID) 25 MCG tablet, TAKE 1 TABLET (25 MCG) BY MOUTH EVERY DAY, Disp: 30 tablet, Rfl: 5    ibuprofen (ADVIL;MOTRIN) 800 MG tablet, TAKE 1 TABLET BY MOUTH EVERY 6 HOURS AS NEEDED FOR PAIN WITH FOOD, Disp: 120 tablet, Rfl: 3    levothyroxine (SYNTHROID) 25 MCG tablet, TAKE 1 TABLET (25 MCG) BY MOUTH EVERY DAY, Disp: 30 tablet, Rfl: 0    doxepin (SINEQUAN) 50 MG capsule, Take 2 capsules by mouth nightly, Disp: , Rfl:     tamsulosin (FLOMAX) 0.4 MG capsule, Take 1 capsule by mouth daily, Disp: , Rfl:     ondansetron (ZOFRAN-ODT) 4 MG disintegrating tablet, Take 1 tablet by mouth every 8 hours as needed for Nausea, Disp: 20 tablet, Rfl: 3    amphetamine-dextroamphetamine (ADDERALL) 30 MG tablet, Take 60 mg by mouth 2 times daily Two 30 mg tabs bid, Disp: , Rfl:     alprazolam (XANAX) 0.5 MG tablet, Take 1 mg by mouth nightly as needed for Sleep Takes three nightly, Disp: , Rfl:   Allergies   Allergen Reactions    Morphine Sulfate Nausea Only     Causes nightmares     Past Surgical History:   Procedure Laterality Date    ACHILLES TENDON SURGERY      right    APPENDECTOMY      BLADDER SURGERY      fistula x2    CARPAL TUNNEL RELEASE  11/20/12 Release of Right Transverse Carpal Tunnel Ligament    COLON SURGERY      COLONOSCOPY N/A 2020    COLON (9:00) performed by Bethany Ni MD at [address] CYST REMOVAL      CYSTOSCOPY  3/13/14    HERNIA REPAIR  14    lysis of adhesions    JOINT REPLACEMENT      KNEE ARTHROPLASTY  [id number]    right total    KNEE ARTHROSCOPY      left    OTHER SURGICAL HISTORY  12    Biopsy of Lesion in Colostomy    REVISION COLOSTOMY Left 3/13/14    Removal of polyps    ROTATOR CUFF REPAIR      right    SIGMOIDOSCOPY N/A 3/12/2020    SIGMOIDOSCOPY DIAGNOSTIC FLEXIBLE (10:20) performed by Bethany Ni MD at [address] ARTHROPLASTY      TOTAL KNEE ARTHROPLASTY Left 2016     Family History   Problem Relation Age of Onset    Heart Disease Mother     Heart Disease Father         had bypass    Parkinsonism Father     Asthma Sister     Brain Cancer Brother     Asthma Son        Social History:   Social History     Tobacco Use    Smoking status: Former Smoker     Packs/day: 1.50     Years: 16.00     Pack years: 24.00     Quit date: 2000     Years since quittin.2    Smokeless tobacco: Never Used   Substance Use Topics    Alcohol use: Yes     Alcohol/week: 0.0 standard drinks     Comment: occas      Tobacco cessation counseling provided as appropriate. REVIEW OF SYSTEMS:    Pertinent positives and negatives are mentioned in the HPI. Otherwise, all other systems were reviewed and negative. Objective:     Physical Exam   BP (!) 179/99 (Site: Right Upper Arm, Position: Sitting, Cuff Size: Large Adult)   Pulse 75   Temp 97.9 °F (36.6 °C) (Temporal)   Resp 16   Ht 5' 10\" (1.778 m)   Wt 194 lb (88 kg)   BMI 27.84 kg/m²   Constitutional: Appears well-developed and well-nourished. Grooming appropriate. No gross deformities. Body mass index is 27.84 kg/m². Eyes: No scleral icterus.  Conjunctiva/lids normal. Vision intact grossly. Pupils equal/symmetric, reactive bilaterally. ENT: External ears/nose without defect, scars, or masses. Hearing grossly intact. No facial deformity. Lips normal, normal dentition. Neck: No masses. Trachea midline. No crepitus. Thyroid not enlarged. Cardiovascular: Normal rate. No peripheral edema. Abdominal aorta normal size to palpation. Pulmonary/Chest: Effort normal. No respiratory distress. No wheezes. No use of accessory muscles. Musculoskeletal: Normal range of motion of head/neck, without deformity, pain, or crepitus, with normal strength and tone. Normal gait. Nails without clubbing or cyanosis. Neurological: Alert and oriented to person, place, and time. No gross deficits. Sensation intact. Skin: Skin is dry. No rashes noted. No pallor. No induration of nodules. Psychiatric: Normal mood and affect. Behavior normal. Oriented to person, place, and time. Judgment and insight reasonable. Abdominal/wound: Soft, nontender, nondistended    Digital rectal exam with patient in left lateral position. Large stool ball noted at tip of examining finger. Unable to disimpact. Labs reviewed: None     Imaging reviewed: None    Reviewed GI note; reviewed last colonoscopy report    Assessment/Plan:       A/P:  New problem: Large rectal stool ball due to dehydrated mucus, barium  Established problem(s): Colostomy present, complex hernia  Additional workup/treatment planned: Continue fleets enemas, possible disimpaction in operating room  Risk of complications/morbidity: High    At this point Abiola Gutierrez has quite a large rectal stool ball that is likely from mucus, barium, insippated stool that his unable to be passed with fleets enemas. Discussed with him continuing enemas for now to try to continue to soften this spot. I asked him to call me Monday and if he continues to have rectal pain and discomfort, we can plan for EUA and disimpaction under anesthesia.     Continue with current prescription medications    DISPOSITION:  Call Monday, possible EUA    My findings will be relayed to consulting practitioner or PCP via Epic note    Total encounter time:  20 min, including any number of the following: review of labs, imaging, provider notes, outside hospital records; performing examination/evaluation; counseling patient and family; ordering medications/tests; placing referrals and communication with referring physicians; coordination of care, and documentation in the EHR. Note completed using dictation software, please excuse any errors.     Electronically signed by Raymon Contreras MD on 3/24/2021 at 2:38 PM

## 2021-03-30 ENCOUNTER — TELEPHONE (OUTPATIENT)
Dept: SURGERY | Age: 61
End: 2021-03-30

## 2021-03-30 NOTE — TELEPHONE ENCOUNTER
Patient saw Dr Jorge Hernández on March 24  He would like to schedule surgery    Please call patient

## 2021-03-31 ENCOUNTER — PREP FOR PROCEDURE (OUTPATIENT)
Dept: SURGERY | Age: 61
End: 2021-03-31

## 2021-03-31 RX ORDER — SODIUM CHLORIDE 0.9 % (FLUSH) 0.9 %
10 SYRINGE (ML) INJECTION PRN
Status: CANCELLED | OUTPATIENT
Start: 2021-03-31

## 2021-03-31 RX ORDER — SODIUM CHLORIDE 0.9 % (FLUSH) 0.9 %
10 SYRINGE (ML) INJECTION EVERY 12 HOURS SCHEDULED
Status: CANCELLED | OUTPATIENT
Start: 2021-03-31

## 2021-04-01 ENCOUNTER — TELEPHONE (OUTPATIENT)
Dept: SURGERY | Age: 61
End: 2021-04-01

## 2021-04-01 NOTE — TELEPHONE ENCOUNTER
Patient called in returning Zahira's phone call to schedule surgery    Please contact the patient at [phone]

## 2021-04-05 NOTE — PROGRESS NOTES
PRE-OP INSTRUCTIONS FOR THE SURGICAL PATIENT YOU ARE UNABLE TO MAKE CONTACT FOR AN INTERVIEW:       All patients having surgery or anesthesia are required to be Covid tested. You will need to quarantined from the time you are tested until your surgery. 1. Follow all instructions provided to you from your surgeons office, including your ARRIVAL TIME. 2. Enter the MAIN entrance located on [address] and report to the desk. 3. Bring your insurance & photo ID with you. You may also be asked to pay a co-pay, as you may want to bring a check or credit card with you. 4. Leave all other valuables at home. 5. Arrange for someone to drive you home and be with you for the first 24 hours after discharge. 6. Bring your medication list with you day of surgery with doses and frequency listed (including over the counter medications)  7. You must contact your surgeon for Instructions regarding:              - ALL medication instructions, especially if taking blood thinners, aspirin, or diabetic medication.  - IF  there is a change in your physical condition such as a cold, fever, rash, cuts, sores or any other infection, especially near your surgical site. 8. A Pre-op History and Physical for surgery MUST be completed by your Physician or an Urgent Care within 30 days of your procedure date. Please bring a copy with you on the day of your procedure and along with any other testing performed. 9. DO NOT EAT ANYTHING eight hours prior to arrival for surgery. May have up to 8 ounces of water 4 hours prior to arrival for surgery. NOTE: ALL Gastric, Bariatric and Bowel surgery patients MUST follow their surgeon's instructions. 10. No gum, candy, mints, or ice chips day of procedure. 11. Please refrain from drinking alcohol the day before or day of your procedure. 12. Please do not smoke the day of your procedure.     13. Dress in loose, comfortable clothing appropriate for redressing after your procedure. Do not wear jewelry (including body piercings), make-up, fingernail polish, lotion, powders or metal hairclips. 15. Contacts will need to be removed prior to surgery. You may want to bring your eye glasses to wear immediately before and after surgery. 14. Dentures will need to be removed before your procedure. 13. Bring cases for your glasses, contacts, dentures, or hearing aids to protect them while you are in surgery. 16. If you use a CPAP, please bring it with you on the day of your procedure. 17. Do not shave or wax for 72 hours prior to procedure near your operative site  18. FOR WOMAN OF CHILDBEARING AGE ONLY- please make sure we can collect a urine sample on arrival.     If you have further questions, you may contact your surgeon's office or us at [phone]     Left instructions on patient's voicemail. Kerwin Sauceda. 4/5/2021 .4:46 PM

## 2021-04-06 RX ORDER — IBUPROFEN 800 MG/1
TABLET ORAL
Qty: 120 TABLET | Refills: 3 | Status: SHIPPED | OUTPATIENT
Start: 2021-04-06 | End: 2021-08-04

## 2021-04-07 ENCOUNTER — NURSE ONLY (OUTPATIENT)
Dept: PRIMARY CARE CLINIC | Age: 61
End: 2021-04-07
Payer: MEDICARE

## 2021-04-07 DIAGNOSIS — Z01.818 PREOP EXAMINATION: Primary | ICD-10-CM

## 2021-04-07 PROCEDURE — 99211 OFF/OP EST MAY X REQ PHY/QHP: CPT | Performed by: NURSE PRACTITIONER

## 2021-04-08 LAB — SARS-COV-2: NOT DETECTED

## 2021-04-09 NOTE — PROGRESS NOTES
[facility] [employer] / [facility] ([facility]) [address] [facility], [address]    Acknowledgment of Informed Consent for Surgical or Medical Procedure and Sedation  I agree to allow doctor(s) Eve Rosas  and his/her associates or assistants, including residents and/or other qualified medical practitioner to perform the following medical treatment or procedure and to administer or direct the administration of sedation as necessary:  Procedure(s): EXAM UNDER ANESTHESIA, FLEXIBLE SIGMOIDOSCOPY, DISIMPACTION OF RECTUM UNDER ANESTHESIA      My doctor has explained the following regarding the proposed procedure:   the explanation of the procedure   the benefits of the procedure   the potential problems that might occur during recuperation   the risks and side effects of the procedure which could include but are not limited to severe blood loss, infection, stroke or death   the benefits, risks and side effect of alternative procedures including the consequences of declining this procedure or any alternative procedures   the likelihood of achieving satisfactory results. I acknowledge no guarantee or assurance has been made to me regarding the results. I understand that during the course of this treatment/procedure, unforeseen conditions can occur which require an additional or different procedure. I agree to allow my physician or assistants to perform such extension of the original procedure as they may find necessary. I understand that sedation will often result in temporary impairment of memory and fine motor skills and that sedation can occasionally progress to a state of deep sedation or general anesthesia. I understand the risks of anesthesia for surgery include, but are not limited to, sore throat, hoarseness, injury to face, mouth, or teeth; nausea; headache; injury to blood vessels or nerves; death, brain damage, or paralysis.     I understand that if I have a Limitation of Treatment order in effect during my hospitalization, the order may or may not be in effect during this procedure. I give my doctor permission to give me blood or blood products. I understand that there are risks with receiving blood such as hepatitis, AIDS, fever, or allergic reaction. I acknowledge that the risks, benefits, and alternatives of this treatment have been explained to me and that no express or implied warranty has been given by the hospital, any blood bank, or any person or entity as to the blood or blood components transfused. At the discretion of my doctor, I agree to allow observers, equipment/product representatives and allow photographing, and/or televising of the procedure, provided my name or identity is maintained confidentially. I agree the hospital may dispose of or use for scientific or educational purposes any tissue, fluid, or body parts which may be removed.     ________________________________Date________Time______ am/pm  ([place] [facility])  Patient or Signature of Closest Relative or Legal Guardian    ________________________________Date________Time______am/pm      Page 1 of  1  Witness

## 2021-04-12 ENCOUNTER — ANESTHESIA EVENT (OUTPATIENT)
Dept: OPERATING ROOM | Age: 61
End: 2021-04-12
Payer: MEDICARE

## 2021-04-12 ENCOUNTER — TELEPHONE (OUTPATIENT)
Dept: SURGERY | Age: 61
End: 2021-04-12

## 2021-04-12 NOTE — TELEPHONE ENCOUNTER
Spoke with patient to confirm arrival time at [facility] (5:30). Reminded patient NPO after midnight, fleet enema before bed,  needed. Georgina Soliman

## 2021-04-13 ENCOUNTER — ANESTHESIA (OUTPATIENT)
Dept: OPERATING ROOM | Age: 61
End: 2021-04-13
Payer: MEDICARE

## 2021-04-13 ENCOUNTER — HOSPITAL ENCOUNTER (OUTPATIENT)
Age: 61
Setting detail: OUTPATIENT SURGERY
Discharge: HOME | End: 2021-04-13
Attending: SURGERY | Admitting: SURGERY
Payer: MEDICARE

## 2021-04-13 VITALS
OXYGEN SATURATION: 97 % | DIASTOLIC BLOOD PRESSURE: 84 MMHG | SYSTOLIC BLOOD PRESSURE: 166 MMHG | RESPIRATION RATE: 18 BRPM | TEMPERATURE: 97.7 F

## 2021-04-13 VITALS
OXYGEN SATURATION: 96 % | BODY MASS INDEX: 27.92 KG/M2 | HEART RATE: 70 BPM | WEIGHT: 195 LBS | TEMPERATURE: 97.4 F | HEIGHT: 70 IN | DIASTOLIC BLOOD PRESSURE: 82 MMHG | SYSTOLIC BLOOD PRESSURE: 149 MMHG | RESPIRATION RATE: 23 BRPM

## 2021-04-13 DIAGNOSIS — G89.4 CHRONIC PAIN SYNDROME: Primary | ICD-10-CM

## 2021-04-13 PROCEDURE — 2500000003 HC RX 250 WO HCPCS: Performed by: SURGERY

## 2021-04-13 PROCEDURE — 2500000003 HC RX 250 WO HCPCS: Performed by: ANESTHESIOLOGY

## 2021-04-13 PROCEDURE — 3600000013 HC SURGERY LEVEL 3 ADDTL 15MIN: Performed by: SURGERY

## 2021-04-13 PROCEDURE — 2580000003 HC RX 258: Performed by: SURGERY

## 2021-04-13 PROCEDURE — 7100000001 HC PACU RECOVERY - ADDTL 15 MIN: Performed by: SURGERY

## 2021-04-13 PROCEDURE — 6370000000 HC RX 637 (ALT 250 FOR IP): Performed by: ANESTHESIOLOGY

## 2021-04-13 PROCEDURE — 7100000011 HC PHASE II RECOVERY - ADDTL 15 MIN: Performed by: SURGERY

## 2021-04-13 PROCEDURE — 2709999900 HC NON-CHARGEABLE SUPPLY: Performed by: SURGERY

## 2021-04-13 PROCEDURE — 6360000002 HC RX W HCPCS: Performed by: NURSE ANESTHETIST, CERTIFIED REGISTERED

## 2021-04-13 PROCEDURE — 7100000000 HC PACU RECOVERY - FIRST 15 MIN: Performed by: SURGERY

## 2021-04-13 PROCEDURE — 3600000003 HC SURGERY LEVEL 3 BASE: Performed by: SURGERY

## 2021-04-13 PROCEDURE — 7100000010 HC PHASE II RECOVERY - FIRST 15 MIN: Performed by: SURGERY

## 2021-04-13 PROCEDURE — 6360000002 HC RX W HCPCS: Performed by: ANESTHESIOLOGY

## 2021-04-13 PROCEDURE — 2580000003 HC RX 258: Performed by: ANESTHESIOLOGY

## 2021-04-13 PROCEDURE — 3700000000 HC ANESTHESIA ATTENDED CARE: Performed by: SURGERY

## 2021-04-13 PROCEDURE — 2500000003 HC RX 250 WO HCPCS: Performed by: NURSE ANESTHETIST, CERTIFIED REGISTERED

## 2021-04-13 PROCEDURE — 45915 REMOVE RECTAL OBSTRUCTION: CPT | Performed by: SURGERY

## 2021-04-13 PROCEDURE — 3700000001 HC ADD 15 MINUTES (ANESTHESIA): Performed by: SURGERY

## 2021-04-13 RX ORDER — SODIUM CHLORIDE 0.9 % (FLUSH) 0.9 %
5-40 SYRINGE (ML) INJECTION EVERY 12 HOURS SCHEDULED
Status: DISCONTINUED | OUTPATIENT
Start: 2021-04-13 | End: 2021-04-13 | Stop reason: HOSPADM

## 2021-04-13 RX ORDER — ONDANSETRON 2 MG/ML
INJECTION INTRAMUSCULAR; INTRAVENOUS PRN
Status: DISCONTINUED | OUTPATIENT
Start: 2021-04-13 | End: 2021-04-13 | Stop reason: SDUPTHER

## 2021-04-13 RX ORDER — LIDOCAINE HYDROCHLORIDE 20 MG/ML
INJECTION, SOLUTION INTRAVENOUS PRN
Status: DISCONTINUED | OUTPATIENT
Start: 2021-04-13 | End: 2021-04-13 | Stop reason: SDUPTHER

## 2021-04-13 RX ORDER — FENTANYL CITRATE 50 UG/ML
25 INJECTION, SOLUTION INTRAMUSCULAR; INTRAVENOUS EVERY 5 MIN PRN
Status: DISCONTINUED | OUTPATIENT
Start: 2021-04-13 | End: 2021-04-13 | Stop reason: HOSPADM

## 2021-04-13 RX ORDER — ONDANSETRON 2 MG/ML
4 INJECTION INTRAMUSCULAR; INTRAVENOUS
Status: COMPLETED | OUTPATIENT
Start: 2021-04-13 | End: 2021-04-13

## 2021-04-13 RX ORDER — ROCURONIUM BROMIDE 10 MG/ML
INJECTION, SOLUTION INTRAVENOUS PRN
Status: DISCONTINUED | OUTPATIENT
Start: 2021-04-13 | End: 2021-04-13 | Stop reason: SDUPTHER

## 2021-04-13 RX ORDER — LABETALOL HYDROCHLORIDE 5 MG/ML
5 INJECTION, SOLUTION INTRAVENOUS EVERY 10 MIN PRN
Status: DISCONTINUED | OUTPATIENT
Start: 2021-04-13 | End: 2021-04-13 | Stop reason: HOSPADM

## 2021-04-13 RX ORDER — KETOROLAC TROMETHAMINE 30 MG/ML
30 INJECTION, SOLUTION INTRAMUSCULAR; INTRAVENOUS
Status: COMPLETED | OUTPATIENT
Start: 2021-04-13 | End: 2021-04-13

## 2021-04-13 RX ORDER — SODIUM CHLORIDE 9 MG/ML
25 INJECTION, SOLUTION INTRAVENOUS PRN
Status: DISCONTINUED | OUTPATIENT
Start: 2021-04-13 | End: 2021-04-13 | Stop reason: HOSPADM

## 2021-04-13 RX ORDER — HYDRALAZINE HYDROCHLORIDE 20 MG/ML
5 INJECTION INTRAMUSCULAR; INTRAVENOUS EVERY 10 MIN PRN
Status: DISCONTINUED | OUTPATIENT
Start: 2021-04-13 | End: 2021-04-13 | Stop reason: HOSPADM

## 2021-04-13 RX ORDER — DEXAMETHASONE SODIUM PHOSPHATE 4 MG/ML
INJECTION, SOLUTION INTRA-ARTICULAR; INTRALESIONAL; INTRAMUSCULAR; INTRAVENOUS; SOFT TISSUE PRN
Status: DISCONTINUED | OUTPATIENT
Start: 2021-04-13 | End: 2021-04-13 | Stop reason: SDUPTHER

## 2021-04-13 RX ORDER — FENTANYL CITRATE 50 UG/ML
INJECTION, SOLUTION INTRAMUSCULAR; INTRAVENOUS PRN
Status: DISCONTINUED | OUTPATIENT
Start: 2021-04-13 | End: 2021-04-13 | Stop reason: SDUPTHER

## 2021-04-13 RX ORDER — SODIUM CHLORIDE, SODIUM LACTATE, POTASSIUM CHLORIDE, CALCIUM CHLORIDE 600; 310; 30; 20 MG/100ML; MG/100ML; MG/100ML; MG/100ML
INJECTION, SOLUTION INTRAVENOUS CONTINUOUS
Status: DISCONTINUED | OUTPATIENT
Start: 2021-04-13 | End: 2021-04-13 | Stop reason: HOSPADM

## 2021-04-13 RX ORDER — PROPOFOL 10 MG/ML
INJECTION, EMULSION INTRAVENOUS PRN
Status: DISCONTINUED | OUTPATIENT
Start: 2021-04-13 | End: 2021-04-13 | Stop reason: SDUPTHER

## 2021-04-13 RX ORDER — SODIUM CHLORIDE 0.9 % (FLUSH) 0.9 %
10 SYRINGE (ML) INJECTION EVERY 12 HOURS SCHEDULED
Status: DISCONTINUED | OUTPATIENT
Start: 2021-04-13 | End: 2021-04-13 | Stop reason: HOSPADM

## 2021-04-13 RX ORDER — SODIUM CHLORIDE 0.9 % (FLUSH) 0.9 %
10 SYRINGE (ML) INJECTION PRN
Status: DISCONTINUED | OUTPATIENT
Start: 2021-04-13 | End: 2021-04-13 | Stop reason: HOSPADM

## 2021-04-13 RX ORDER — SUCCINYLCHOLINE/SOD CL,ISO/PF 200MG/10ML
SYRINGE (ML) INTRAVENOUS PRN
Status: DISCONTINUED | OUTPATIENT
Start: 2021-04-13 | End: 2021-04-13 | Stop reason: SDUPTHER

## 2021-04-13 RX ORDER — OXYCODONE HYDROCHLORIDE AND ACETAMINOPHEN 5; 325 MG/1; MG/1
1 TABLET ORAL EVERY 6 HOURS PRN
Qty: 20 TABLET | Refills: 0 | Status: SHIPPED | OUTPATIENT
Start: 2021-04-13 | End: 2021-04-13 | Stop reason: HOSPADM

## 2021-04-13 RX ORDER — SODIUM CHLORIDE 0.9 % (FLUSH) 0.9 %
5-40 SYRINGE (ML) INJECTION PRN
Status: DISCONTINUED | OUTPATIENT
Start: 2021-04-13 | End: 2021-04-13 | Stop reason: HOSPADM

## 2021-04-13 RX ORDER — LIDOCAINE HYDROCHLORIDE 10 MG/ML
1 INJECTION, SOLUTION EPIDURAL; INFILTRATION; INTRACAUDAL; PERINEURAL
Status: DISCONTINUED | OUTPATIENT
Start: 2021-04-13 | End: 2021-04-13 | Stop reason: HOSPADM

## 2021-04-13 RX ORDER — OXYCODONE HYDROCHLORIDE AND ACETAMINOPHEN 5; 325 MG/1; MG/1
2 TABLET ORAL PRN
Status: COMPLETED | OUTPATIENT
Start: 2021-04-13 | End: 2021-04-13

## 2021-04-13 RX ORDER — BUPIVACAINE HYDROCHLORIDE 5 MG/ML
INJECTION, SOLUTION EPIDURAL; INTRACAUDAL PRN
Status: DISCONTINUED | OUTPATIENT
Start: 2021-04-13 | End: 2021-04-13 | Stop reason: ALTCHOICE

## 2021-04-13 RX ORDER — MAGNESIUM HYDROXIDE 1200 MG/15ML
LIQUID ORAL CONTINUOUS PRN
Status: COMPLETED | OUTPATIENT
Start: 2021-04-13 | End: 2021-04-13

## 2021-04-13 RX ORDER — OXYCODONE HYDROCHLORIDE AND ACETAMINOPHEN 5; 325 MG/1; MG/1
1 TABLET ORAL PRN
Status: COMPLETED | OUTPATIENT
Start: 2021-04-13 | End: 2021-04-13

## 2021-04-13 RX ORDER — FENTANYL CITRATE 50 UG/ML
50 INJECTION, SOLUTION INTRAMUSCULAR; INTRAVENOUS EVERY 5 MIN PRN
Status: DISCONTINUED | OUTPATIENT
Start: 2021-04-13 | End: 2021-04-13 | Stop reason: HOSPADM

## 2021-04-13 RX ORDER — OXYCODONE AND ACETAMINOPHEN 7.5; 325 MG/1; MG/1
1 TABLET ORAL EVERY 6 HOURS PRN
Qty: 20 TABLET | Refills: 0 | Status: SHIPPED | OUTPATIENT
Start: 2021-04-13 | End: 2021-04-18

## 2021-04-13 RX ORDER — LOSARTAN POTASSIUM 100 MG/1
100 TABLET ORAL ONCE
Status: COMPLETED | OUTPATIENT
Start: 2021-04-13 | End: 2021-04-13

## 2021-04-13 RX ADMIN — PROPOFOL 200 MG: 10 INJECTION, EMULSION INTRAVENOUS at 07:16

## 2021-04-13 RX ADMIN — LABETALOL HYDROCHLORIDE 5 MG: 5 INJECTION, SOLUTION INTRAVENOUS at 09:26

## 2021-04-13 RX ADMIN — LIDOCAINE HYDROCHLORIDE 50 MG: 20 INJECTION, SOLUTION INTRAVENOUS at 07:16

## 2021-04-13 RX ADMIN — ONDANSETRON 4 MG: 2 INJECTION INTRAMUSCULAR; INTRAVENOUS at 07:29

## 2021-04-13 RX ADMIN — FENTANYL CITRATE 50 MCG: 50 INJECTION, SOLUTION INTRAMUSCULAR; INTRAVENOUS at 07:37

## 2021-04-13 RX ADMIN — HYDROMORPHONE HYDROCHLORIDE 0.5 MG: 1 INJECTION, SOLUTION INTRAMUSCULAR; INTRAVENOUS; SUBCUTANEOUS at 08:44

## 2021-04-13 RX ADMIN — KETOROLAC TROMETHAMINE 30 MG: 30 INJECTION, SOLUTION INTRAMUSCULAR at 09:13

## 2021-04-13 RX ADMIN — ONDANSETRON 4 MG: 2 INJECTION INTRAMUSCULAR; INTRAVENOUS at 08:31

## 2021-04-13 RX ADMIN — FENTANYL CITRATE 50 MCG: 50 INJECTION, SOLUTION INTRAMUSCULAR; INTRAVENOUS at 07:15

## 2021-04-13 RX ADMIN — LOSARTAN POTASSIUM 100 MG: 100 TABLET, FILM COATED ORAL at 09:56

## 2021-04-13 RX ADMIN — Medication 140 MG: at 07:16

## 2021-04-13 RX ADMIN — SODIUM CHLORIDE, POTASSIUM CHLORIDE, SODIUM LACTATE AND CALCIUM CHLORIDE: 600; 310; 30; 20 INJECTION, SOLUTION INTRAVENOUS at 06:30

## 2021-04-13 RX ADMIN — OXYCODONE AND ACETAMINOPHEN 2 TABLET: 5; 325 TABLET ORAL at 10:03

## 2021-04-13 RX ADMIN — HYDROMORPHONE HYDROCHLORIDE 0.5 MG: 1 INJECTION, SOLUTION INTRAMUSCULAR; INTRAVENOUS; SUBCUTANEOUS at 08:35

## 2021-04-13 RX ADMIN — HYDROMORPHONE HYDROCHLORIDE 0.5 MG: 1 INJECTION, SOLUTION INTRAMUSCULAR; INTRAVENOUS; SUBCUTANEOUS at 09:53

## 2021-04-13 RX ADMIN — ROCURONIUM BROMIDE 5 MG: 10 INJECTION INTRAVENOUS at 07:16

## 2021-04-13 RX ADMIN — DEXAMETHASONE SODIUM PHOSPHATE 4 MG: 4 INJECTION, SOLUTION INTRAMUSCULAR; INTRAVENOUS at 07:29

## 2021-04-13 RX ADMIN — VERAPAMIL HYDROCHLORIDE 180 MG: 180 TABLET, FILM COATED, EXTENDED RELEASE ORAL at 09:57

## 2021-04-13 ASSESSMENT — PAIN DESCRIPTION - ONSET
ONSET: ON-GOING
ONSET: AWAKENED FROM SLEEP

## 2021-04-13 ASSESSMENT — PULMONARY FUNCTION TESTS
PIF_VALUE: 4
PIF_VALUE: 24
PIF_VALUE: 30
PIF_VALUE: 21
PIF_VALUE: 27
PIF_VALUE: 28
PIF_VALUE: 4
PIF_VALUE: 35
PIF_VALUE: 27
PIF_VALUE: 3
PIF_VALUE: 4
PIF_VALUE: 7
PIF_VALUE: 24

## 2021-04-13 ASSESSMENT — PAIN SCALES - GENERAL
PAINLEVEL_OUTOF10: 8
PAINLEVEL_OUTOF10: 4
PAINLEVEL_OUTOF10: 7

## 2021-04-13 ASSESSMENT — LIFESTYLE VARIABLES: SMOKING_STATUS: 0

## 2021-04-13 ASSESSMENT — PAIN DESCRIPTION - FREQUENCY
FREQUENCY: INTERMITTENT
FREQUENCY: CONTINUOUS
FREQUENCY: CONTINUOUS

## 2021-04-13 ASSESSMENT — PAIN DESCRIPTION - PAIN TYPE
TYPE: ACUTE PAIN
TYPE: SURGICAL PAIN

## 2021-04-13 ASSESSMENT — PAIN DESCRIPTION - PROGRESSION: CLINICAL_PROGRESSION: GRADUALLY IMPROVING

## 2021-04-13 ASSESSMENT — PAIN DESCRIPTION - ORIENTATION
ORIENTATION: ANTERIOR
ORIENTATION: ANTERIOR

## 2021-04-13 ASSESSMENT — ENCOUNTER SYMPTOMS: SHORTNESS OF BREATH: 0

## 2021-04-13 ASSESSMENT — PAIN DESCRIPTION - DESCRIPTORS: DESCRIPTORS: SHARP

## 2021-04-13 ASSESSMENT — PAIN - FUNCTIONAL ASSESSMENT: PAIN_FUNCTIONAL_ASSESSMENT: 0-10

## 2021-04-13 NOTE — PROGRESS NOTES
PT and SO in waiting room comment that he has a on going hernia and will discuss same with Dr Ayala Led at post op visit and they are requesting pain medication to help him through till this visit RN will contact Surgical resident with info and chart response

## 2021-04-13 NOTE — OP NOTE
Operative Note      Patient: Bro Rodriguez  YOB: 1960  MRN: [id number]    Date of Procedure: 4/13/2021    Pre-Op Diagnosis: Fecal impaction ([address] [K56.41]    Post-Op Diagnosis: Same       Procedure(s):  EXAM UNDER ANESTHESIA, FLEXIBLE SIGMOIDOSCOPY, DISIMPACTION OF RECTUM UNDER ANESTHESIA    Surgeon(s):  Hillary Pearce MD    Surgeon Popeye:  Resident: Dav Jeronimo MD    Anesthesia: General    Estimated Blood Loss (mL): Minimal    Complications: None    Specimens:   * No specimens in log *    Implants:  * No implants in log *      Drains:   Colostomy LUQ (Active)       Findings: Large amount of mucous stool    Indications:   Patient status post colostomy creation with blind end rectal pouch in need of fecal disimpaction. Detailed Description of Procedure:   Patient was placed under general anesthesia and transferred to the OR table in prone positioning. He was prepped and draped in the normal sterile fashion. A digital rectal exam was performed and stool was found in the rectal vault. Manual disimpaction was performed with evacuation of a large stool ball. A flexible sigmoidoscopy was performed showing a cleared rectum and intact stable line stump from previous surgery. A digital rectal exam was again performed without abnormalities. Local anesthetic injected perianal skin. The patient was transferred back to a stretcher and woken up without complication. He was transferred to the PACU in stable, good condition. Patient's fiance updated on operative findings. Electronically signed by Dav Jeronimo MD on 4/13/2021 at 7:51 AM     ATTENDING ATTESTATION    I was present and scrubbed for the entirety of the procedure. I agree with the resident documentation, with edits made as necessary.     Hillary Pearce MD

## 2021-04-13 NOTE — PROGRESS NOTES
Discussed with pt POC that if next BP at 1015 is close to his admit BP and pain has not increase we will progress to SDS for DC home pt agrees with POC

## 2021-04-13 NOTE — PROGRESS NOTES
Ambulatory Surgery/Procedure Discharge Note    Vitals:    04/13/21 1102   BP: (!) 149/82   Pulse: 70   Resp: 23   Temp: 97.4 °F (36.3 °C)   SpO2: 96%       In: 1575 [P.O.:600; I.V.:975]  Out: 300 [Urine:300]    Restroom use offered before discharge. Yes    Pain assessment:  {Pain assessment:  Pain Level: 5     Underpants on and fluffs at anal area are clean and dry. He states itching is less and pain is less. \"it's really subsided. I feel no pelvic pain now and just a little pain at anus. Its really not bad,. Voided and dressed self. Stable when up walking. Made aware and written down when last dose percocet given. Soft abdomen. Unable to view stoma because of bag. BP improved and ware not to repeat his BP medication  Sent with dressings  BP within 20% of pre op    (NOTE: this note is from [employer]. I badged in and computer was open under [facility] [place] and I charted. )    Patient discharged to home/self care.  Patient discharged via wheel chair by me to waiting family/S.O.       4/13/2021 11:50 AM

## 2021-04-13 NOTE — ANESTHESIA PRE PROCEDURE
Department of Anesthesiology  Preprocedure Note       Name:  Stormy Kemp   Age:  61 y.o.  :  1960                                          MRN:  [id number]         Date:  2021      Surgeon: Tana Fischer):  Leobardo Mary MD    Procedure: Procedure(s):  EXAM UNDER ANESTHESIA, FLEXIBLE SIGMOIDOSCOPY, DISIMPACTION OF RECTUM UNDER ANESTHESIA  . Medications prior to admission:   Prior to Admission medications    Medication Sig Start Date End Date Taking?  Authorizing Provider   verapamil (CALAN SR) 180 MG extended release tablet TAKE 1 TABLET (180 MG) BY MOUTH NIGHTLY 3/5/21  Yes NATALEE Munoz CNP   losartan (COZAAR) 100 MG tablet TAKE 1 TABLET (100 MG) BY MOUTH EVERY DAY 21  Yes NATALEE Lawson CNP   levothyroxine (SYNTHROID) 25 MCG tablet TAKE 1 TABLET (25 MCG) BY MOUTH EVERY DAY 21  Yes NATALEE Lawson CNP   tamsulosin (FLOMAX) 0.4 MG capsule Take 1 capsule by mouth daily 10/13/20  Yes Historical Provider, MD   ondansetron (ZOFRAN-ODT) 4 MG disintegrating tablet Take 1 tablet by mouth every 8 hours as needed for Nausea 19  Yes NATALEE Lawson CNP   amphetamine-dextroamphetamine (ADDERALL) 30 MG tablet Take 60 mg by mouth 2 times daily Two 30 mg tabs bid   Yes Historical Provider, MD   alprazolam (XANAX) 0.5 MG tablet Take 1 mg by mouth nightly as needed for Sleep Takes three nightly   Yes Historical Provider, MD   ibuprofen (ADVIL;MOTRIN) 800 MG tablet TAKE 1 TABLET BY MOUTH EVERY 6 HOURS AS NEEDED FOR PAIN WITH FOOD 21   NATALEE Lawson CNP   levothyroxine (SYNTHROID) 25 MCG tablet TAKE 1 TABLET (25 MCG) BY MOUTH EVERY DAY 20   NATALEE Tristan CNP   doxepin (SINEQUAN) 50 MG capsule Take 2 capsules by mouth nightly 20   Historical Provider, MD       Current medications:    Current Facility-Administered Medications   Medication Dose Route Frequency Provider Last Rate Last Admin    lactated ringers infusion Intravenous Continuous Janet Bennett  mL/hr at 04/13/21 0630 New Bag at 04/13/21 0630    sodium chloride flush 0.9 % injection 5-40 mL  5-40 mL Intravenous 2 times per day Janet Bennett MD        sodium chloride flush 0.9 % injection 5-40 mL  5-40 mL Intravenous PRN Janet Bennett MD        0.9 % sodium chloride infusion  25 mL Intravenous PRN Janet Bennett MD        lidocaine PF 1 % injection 1 mL  1 mL Intradermal Once PRN Janet Bennett MD        sodium chloride flush 0.9 % injection 10 mL  10 mL Intravenous 2 times per day Ana Mejias MD        sodium chloride flush 0.9 % injection 10 mL  10 mL Intravenous PRN Ana Mejias MD           Allergies: Allergies   Allergen Reactions    Morphine Sulfate Nausea Only     Causes nightmares       Problem List:    Patient Active Problem List   Diagnosis Code    Mood disorder ([address] F39    Anxiety F41.9    Low back pain M54.5    Personal history of PE (pulmonary embolism)     Chronic pain syndrome G89.4    DDD (degenerative disc disease), lumbar M51.36    DDD (degenerative disc disease), cervical M50.30    Lumbar radiculitis M54.16    Abdominal pain R10.9    Joint prosthesis complication ([address] U54. 9XXA    HTN (hypertension) I10    ADHD (attention deficit hyperactivity disorder) F90.9    Right shoulder pain M25.511    Rotator cuff syndrome of right shoulder M75. 101    Hematuria     Calculus in bladder N21.0    History of urinary tract infection Z87.440    Psychosexual dysfunction with inhibited sexual excitement F52.8    Urethral stricture N35.919    ED (erectile dysfunction) N52.9    H/O urinary tract infection Z87.440    Insomnia G47.00    Constipation K59.00    Left knee DJD M17.12    Pain in right foot M79.671    Bladder calculi N21.0    Acquired hypothyroidism E03.9    Myofascial pain M79.18    Status post total left knee replacement Z96.652    Localized osteoarthrosis, lower leg M17.10    Narcotic abuse Former Smoker     Packs/day: 1.50     Years: 16.00     Pack years: 24.00     Quit date: 2000     Years since quittin.2    Smokeless tobacco: Never Used   Substance Use Topics    Alcohol use: Yes     Alcohol/week: 0.0 standard drinks     Comment: occas                                Counseling given: Not Answered      Vital Signs (Current):   Vitals:    21 0607   BP: (!) 161/96   Pulse: 63   Resp: 14   Temp: 98.2 °F (36.8 °C)   TempSrc: Oral   SpO2: 97%   Weight: 195 lb (88.5 kg)   Height: 5' 10\" (1.778 m)                                              BP Readings from Last 3 Encounters:   21 (!) 161/96   21 (!) 179/99   21 (!) 169/91       NPO Status: Time of last liquid consumption:                         Time of last solid consumption:                         Date of last liquid consumption: 21                        Date of last solid food consumption: 21    BMI:   Wt Readings from Last 3 Encounters:   21 195 lb (88.5 kg)   21 194 lb (88 kg)   21 193 lb 9.6 oz (87.8 kg)     Body mass index is 27.98 kg/m². CBC:   Lab Results   Component Value Date    WBC 4.6 10/26/2020    RBC 4.99 10/26/2020    HGB 15.3 10/26/2020    HCT 45.2 10/26/2020    MCV 90.5 10/26/2020    RDW 14.1 10/26/2020     10/26/2020       CMP:   Lab Results   Component Value Date     10/26/2020    K 4.6 10/26/2020     10/26/2020    CO2 29 10/26/2020    BUN 14 10/26/2020    CREATININE 0.8 10/26/2020    GFRAA >60 10/26/2020    GFRAA >60 06/15/2013    AGRATIO 1.9 10/26/2020    LABGLOM >60 10/26/2020    GLUCOSE 100 10/26/2020    PROT 6.7 10/26/2020    PROT 7.4 2012    CALCIUM 9.0 10/26/2020    BILITOT 0.4 10/26/2020    ALKPHOS 98 10/26/2020    AST 9 10/26/2020    ALT 7 10/26/2020       POC Tests: No results for input(s): POCGLU, POCNA, POCK, POCCL, POCBUN, POCHEMO, POCHCT in the last 72 hours.     Coags:   Lab Results   Component Value Date    PROTIME 10.7 11/23/2016    INR 0.94 11/23/2016    APTT 31.0 11/23/2016       HCG (If Applicable): No results found for: PREGTESTUR, PREGSERUM, HCG, HCGQUANT     ABGs: No results found for: PHART, PO2ART, HHG8INR, NBE3KKB, BEART, W9PMHRXW     Type & Screen (If Applicable):  Lab Results   Component Value Date    LABABO AB 02/07/2012    LABRH Positive 02/07/2012       Drug/Infectious Status (If Applicable):  No results found for: HIV, HEPCAB    COVID-19 Screening (If Applicable):   Lab Results   Component Value Date    COVID19 Not Detected 04/07/2021           Anesthesia Evaluation  Patient summary reviewed  Airway: Mallampati: II  TM distance: >3 FB   Neck ROM: full  Mouth opening: > = 3 FB Dental: normal exam         Pulmonary: breath sounds clear to auscultation      (-) asthma, shortness of breath, sleep apnea and not a current smoker                           Cardiovascular:  Exercise tolerance: good (>4 METS),   (+) hypertension:,     (-) past MI, CAD, CABG/stent, dysrhythmias,  angina,  CHF, orthopnea and PND    ECG reviewed  Rhythm: regular  Rate: normal           Beta Blocker:  Not on Beta Blocker         Neuro/Psych:   (+) psychiatric history:            GI/Hepatic/Renal:   (+) bowel prep,      (-) GERD, hepatitis and liver disease       Endo/Other:    (+) hypothyroidism::., no malignancy/cancer. (-) diabetes mellitus, no malignancy/cancer               Abdominal:           Vascular:   + PE (2016 following colostomy surgery, not on blood thinner). Anesthesia Plan      general     ASA 3       Induction: intravenous. MIPS: Postoperative opioids intended and Prophylactic antiemetics administered. Anesthetic plan and risks discussed with patient. Plan discussed with CRNA.                   Martha Vogel MD   4/13/2021

## 2021-04-13 NOTE — BRIEF OP NOTE
Brief Postoperative Note      Patient: Constanza Roland  YOB: 1960  MRN: [id number]    Date of Procedure: 4/13/2021    Pre-Op Diagnosis: Fecal impaction ([address] [K56.41]    Post-Op Diagnosis: Same       Procedure(s):  EXAM UNDER ANESTHESIA, FLEXIBLE SIGMOIDOSCOPY, DISIMPACTION OF RECTUM UNDER ANESTHESIA    Surgeon(s):  Davon Jiménez MD    Assistant:  Resident: Toni Porter MD    Anesthesia: General    Estimated Blood Loss (mL): Minimal    Complications: None    Specimens:   * No specimens in log *    Implants:  * No implants in log *      Drains:   Colostomy LUQ (Active)       Findings: Large amounts of mucous stool disimpacted    Electronically signed by Toni Potrer MD on 4/13/2021 at 7:50 AM

## 2021-04-13 NOTE — H&P
Wali Zavala    [id number]    [facility] Same Day Surgery Update H & P  Department of General Surgery   Surgical Service   Pre-operative History and Physical  Last H & P within the last 30 days. DIAGNOSIS:   Fecal impaction ([address] [K56.41]    Procedure(s):  EXAM UNDER ANESTHESIA, FLEXIBLE SIGMOIDOSCOPY, DISIMPACTION OF RECTUM UNDER ANESTHESIA  . HISTORY OF PRESENT ILLNESS:   Patient has a fecal impaction     Covid 19:  Patient denies fever, chills, cough or known exposure to Covid-19.        Past Medical History:        Diagnosis Date    Abdominal pain     Acquired hypothyroidism 11/3/2016    ADHD (attention deficit hyperactivity disorder)     Anxiety     Arthritis     Back pain     Chronic pain syndrome     DDD (degenerative disc disease), cervical     DDD (degenerative disc disease), lumbar     Depression     ED (erectile dysfunction) 10/8/2014    FHx: migraine headaches     Hematuria     History of pulmonary embolism     HTN (hypertension) 2/8/2012    Hx of blood clots     Joint prosthesis complication (HCC)     Lumbar radiculitis     Narcotic abuse ([facility] [address] [facility] 3/20/2018    Personal history of PE (pulmonary embolism) 2/8/2012    Right shoulder pain     Rotator cuff syndrome of right shoulder     right shoulder rotator cuff release     Past Surgical History:        Procedure Laterality Date    ACHILLES TENDON SURGERY      right    APPENDECTOMY      BLADDER SURGERY      fistula x2    CARPAL TUNNEL RELEASE  11/20/12    Release of Right Transverse Carpal Tunnel Ligament    COLON SURGERY      COLONOSCOPY N/A 2/25/2020    COLON (9:00) performed by Nidia Padilla MD at [address]  3/13/14    HERNIA REPAIR  11/20/14    lysis of adhesions    JOINT REPLACEMENT Bilateral     KNEE ARTHROPLASTY  [id number]    right total    KNEE ARTHROSCOPY      left    OTHER SURGICAL HISTORY  11/21/12    Biopsy of Lesion in Colostomy    REVISION COLOSTOMY Left 3/13/14    Removal of polyps    ROTATOR CUFF REPAIR      right    SIGMOIDOSCOPY N/A 3/12/2020    SIGMOIDOSCOPY DIAGNOSTIC FLEXIBLE (10:20) performed by Anayeli Talavera MD at [address] [place] [address] Dr      TOTAL KNEE ARTHROPLASTY Left 2016     Past Social History:  Social History     Socioeconomic History    Marital status: Single     Spouse name: Kamran Rhoades Number of children: None    Years of education: 15    Highest education level: None   Occupational History    Occupation: disabled   Social Needs    Financial resource strain: None    Food insecurity     Worry: None     Inability: None    Transportation needs     Medical: None     Non-medical: None   Tobacco Use    Smoking status: Former Smoker     Packs/day: 1.50     Years: 16.00     Pack years: 24.00     Quit date: 2000     Years since quittin.2    Smokeless tobacco: Never Used   Substance and Sexual Activity    Alcohol use: Yes     Alcohol/week: 0.0 standard drinks     Comment: occas    Drug use: No     Comment: has medical MJ card- has not been using that    Sexual activity: Yes     Partners: Female   Lifestyle    Physical activity     Days per week: None     Minutes per session: None    Stress: None   Relationships    Social connections     Talks on phone: None     Gets together: None     Attends [employer] service: None     Active member of club or organization: None     Attends meetings of clubs or organizations: None     Relationship status: None    Intimate partner violence     Fear of current or ex partner: None     Emotionally abused: None     Physically abused: None     Forced sexual activity: None   Other Topics Concern    None   Social History Narrative    None         Medications Prior to Admission:      Prior to Admission medications    Medication Sig Start Date End Date Taking?  Authorizing Provider   verapamil (CALAN SR) 180 MG extended release tablet TAKE 1 TABLET (180 MG) BY MOUTH NIGHTLY 3/5/21  Yes NATALEE Mejias CNP   losartan (COZAAR) 100 MG tablet TAKE 1 TABLET (100 MG) BY MOUTH EVERY DAY 2/9/21  Yes NATALEE Hernandez CNP   levothyroxine (SYNTHROID) 25 MCG tablet TAKE 1 TABLET (25 MCG) BY MOUTH EVERY DAY 1/11/21  Yes NATALEE Hernandez CNP   tamsulosin (FLOMAX) 0.4 MG capsule Take 1 capsule by mouth daily 10/13/20  Yes Historical Provider, MD   ondansetron (ZOFRAN-ODT) 4 MG disintegrating tablet Take 1 tablet by mouth every 8 hours as needed for Nausea 2/8/19  Yes NATALEE Hernandez CNP   amphetamine-dextroamphetamine (ADDERALL) 30 MG tablet Take 60 mg by mouth 2 times daily Two 30 mg tabs bid   Yes Historical Provider, MD   alprazolam (XANAX) 0.5 MG tablet Take 1 mg by mouth nightly as needed for Sleep Takes three nightly   Yes Historical Provider, MD   ibuprofen (ADVIL;MOTRIN) 800 MG tablet TAKE 1 TABLET BY MOUTH EVERY 6 HOURS AS NEEDED FOR PAIN WITH FOOD 4/6/21   NATALEE Hernandez CNP   levothyroxine (SYNTHROID) 25 MCG tablet TAKE 1 TABLET (25 MCG) BY MOUTH EVERY DAY 11/11/20   NATALEE Domínguez CNP   doxepin (SINEQUAN) 50 MG capsule Take 2 capsules by mouth nightly 8/14/20   Historical Provider, MD         Allergies:  Morphine sulfate    PHYSICAL EXAM:      BP (!) 161/96   Pulse 63   Temp 98.2 °F (36.8 °C) (Oral)   Resp 14   Ht 5' 10\" (1.778 m)   Wt 195 lb (88.5 kg)   SpO2 97%   BMI 27.98 kg/m²      Airway:  Airway patent with no audible stridor    Heart:  regular rate and rhythm, No murmur noted    Lungs:  No increased work of breathing, good air exchange, clear to auscultation bilaterally, no crackles or wheezing    Abdomen:  soft, non-distended, non-tender, no rebound tenderness or guarding, normal active bowel sounds and no masses palpated    ASSESSMENT AND PLAN     Patient is a 61 y.o. male with above specified procedure planned.     1.  The patients history and physical was obtained and signed off by the pre-admission testing department. Patient seen and focused exam done today- no new changes since last physical exam on 3-    2. Access to ancillary services are available per request of the provider.     Adrián Harris     4/13/2021

## 2021-04-13 NOTE — PROGRESS NOTES
PACU Transfer to [facility]    Vitals:    04/13/21 1036   BP: (!) 173/95   Pulse: 67   Resp: 23   Temp: 97 °F (36.1 °C)   SpO2: 95%     BP with in 20% of admit     Intake/Output Summary (Last 24 hours) at 4/13/2021 1052  Last data filed at 4/13/2021 1031  Gross per 24 hour   Intake 1215 ml   Output --   Net 1215 ml       Pain assessment:  receiving treatment  Pain Level: 8    Patient transferred to care of BETTY RN.    4/13/2021 10:52 AM

## 2021-04-13 NOTE — PROGRESS NOTES
Pt states pain in his pelvic area 8/10 which is his normal pain and has been in pain management for this pain that is unrelated to is current surgery/visit today

## 2021-04-13 NOTE — PROGRESS NOTES
Ambulatory Surgery/Procedure Discharge Note    Vitals:    04/13/21 1102   BP: (!) 149/82   Pulse: 70   Resp: 23   Temp: 97.4 °F (36.3 °C)   SpO2: 96%       In: 1575 [P.O.:600; I.V.:975]  Out: 300 [Urine:300]    Restroom use offered before discharge. Yes    Pain assessment:  {Pain assessment:  Pain Level: 5     Underpants on and fluffs at anal area are clean and dry. He states itching is less and pain is less. \"its sukumar;lly subsided. I feel no pelvic pain mnow and gama a little pain at anus. Its really not bad,. Voided and dressed self        Patient discharged to home/self care.  Patient discharged via wheel chair by transporter to waiting family/S.O.       4/13/2021 11:39 AM

## 2021-04-21 ENCOUNTER — OFFICE VISIT (OUTPATIENT)
Dept: SURGERY | Age: 61
End: 2021-04-21

## 2021-04-21 VITALS
DIASTOLIC BLOOD PRESSURE: 99 MMHG | BODY MASS INDEX: 28.35 KG/M2 | OXYGEN SATURATION: 100 % | HEART RATE: 78 BPM | TEMPERATURE: 98 F | WEIGHT: 198 LBS | SYSTOLIC BLOOD PRESSURE: 189 MMHG | HEIGHT: 70 IN

## 2021-04-21 DIAGNOSIS — Z93.3 STATUS POST COLOSTOMY (HCC): ICD-10-CM

## 2021-04-21 DIAGNOSIS — K56.41 FECAL IMPACTION IN RECTUM (HCC): Primary | ICD-10-CM

## 2021-04-21 PROCEDURE — 99024 POSTOP FOLLOW-UP VISIT: CPT | Performed by: SURGERY

## 2021-04-21 NOTE — PROGRESS NOTES
[address] [address] [address] [facility]  Dept: [phone]  Dept Fax: [phone]  Loc: [phone]    Visit Date: 4/21/2021    Eyad Sinclair is a 61 y.o. male who presents today for: Post-Op Check (Fecal impaction   4-13-21)      Subjective:     Eyad Sinclair is a 61 y.o. male here for postoperative visit after disimpaction of rectal stump 2 wks ago. Overall doing great. Patient's problem list, medications, past medical, surgical, family, and social histories were reviewed and updated in the chart as indicated today. Objective:     BP (!) 189/99   Pulse 78   Temp 98 °F (36.7 °C) (Oral)   Ht 5' 10\" (1.778 m)   Wt 198 lb (89.8 kg)   SpO2 100%   BMI 28.41 kg/m²       Assessment/Plan:       ASSESSMENT/PLAN:    Doing well. Feeling better after disimpaction. DISPOSITION:  F/u PRN    Note completed using dictation software, please excuse any errors. Referring/primary care physician updated through [employer] note if PCP was listed.     Electronically signed by Juan Carlos Steel MD on 4/21/2021 at 2:28 PM

## 2021-06-04 DIAGNOSIS — I10 ESSENTIAL HYPERTENSION: ICD-10-CM

## 2021-06-04 DIAGNOSIS — E03.9 ACQUIRED HYPOTHYROIDISM: ICD-10-CM

## 2021-06-07 RX ORDER — LEVOTHYROXINE SODIUM 0.03 MG/1
TABLET ORAL
Qty: 30 TABLET | Refills: 5 | Status: SHIPPED | OUTPATIENT
Start: 2021-06-07 | End: 2021-06-15

## 2021-06-15 ENCOUNTER — OFFICE VISIT (OUTPATIENT)
Dept: FAMILY MEDICINE CLINIC | Age: 61
End: 2021-06-15
Payer: MEDICARE

## 2021-06-15 ENCOUNTER — APPOINTMENT (OUTPATIENT)
Dept: GENERAL RADIOLOGY | Age: 61
End: 2021-06-15
Payer: MEDICARE

## 2021-06-15 ENCOUNTER — HOSPITAL ENCOUNTER (EMERGENCY)
Age: 61
Discharge: HOME | End: 2021-06-15
Payer: MEDICARE

## 2021-06-15 VITALS
RESPIRATION RATE: 16 BRPM | BODY MASS INDEX: 28.2 KG/M2 | DIASTOLIC BLOOD PRESSURE: 90 MMHG | HEART RATE: 98 BPM | WEIGHT: 197 LBS | HEIGHT: 70 IN | OXYGEN SATURATION: 98 % | SYSTOLIC BLOOD PRESSURE: 144 MMHG | TEMPERATURE: 98.3 F

## 2021-06-15 VITALS
SYSTOLIC BLOOD PRESSURE: 122 MMHG | OXYGEN SATURATION: 98 % | HEART RATE: 73 BPM | BODY MASS INDEX: 28.12 KG/M2 | DIASTOLIC BLOOD PRESSURE: 84 MMHG | WEIGHT: 196 LBS

## 2021-06-15 DIAGNOSIS — M79.10 MUSCULAR ACHES: ICD-10-CM

## 2021-06-15 DIAGNOSIS — R53.83 FATIGUE, UNSPECIFIED TYPE: Primary | ICD-10-CM

## 2021-06-15 DIAGNOSIS — U07.1 COVID-19: Primary | ICD-10-CM

## 2021-06-15 DIAGNOSIS — R53.83 OTHER FATIGUE: ICD-10-CM

## 2021-06-15 DIAGNOSIS — R06.02 SOB (SHORTNESS OF BREATH): ICD-10-CM

## 2021-06-15 DIAGNOSIS — R05.9 COUGH: ICD-10-CM

## 2021-06-15 LAB
A/G RATIO: 1.1 (ref 1.1–2.2)
ALBUMIN SERPL-MCNC: 4.1 G/DL (ref 3.4–5)
ALP BLD-CCNC: 98 U/L (ref 40–129)
ALT SERPL-CCNC: 14 U/L (ref 10–40)
ANION GAP SERPL CALCULATED.3IONS-SCNC: 9 MMOL/L (ref 3–16)
AST SERPL-CCNC: 20 U/L (ref 15–37)
BASOPHILS ABSOLUTE: 0 K/UL (ref 0–0.2)
BASOPHILS RELATIVE PERCENT: 0.3 %
BILIRUB SERPL-MCNC: 0.5 MG/DL (ref 0–1)
BILIRUBIN URINE: NEGATIVE
BLOOD, URINE: NEGATIVE
BUN BLDV-MCNC: 15 MG/DL (ref 7–20)
CALCIUM SERPL-MCNC: 8.9 MG/DL (ref 8.3–10.6)
CHLORIDE BLD-SCNC: 102 MMOL/L (ref 99–110)
CLARITY: CLEAR
CO2: 27 MMOL/L (ref 21–32)
COLOR: YELLOW
CREAT SERPL-MCNC: 0.9 MG/DL (ref 0.8–1.3)
EKG ATRIAL RATE: 65 BPM
EKG DIAGNOSIS: NORMAL
EKG P AXIS: 53 DEGREES
EKG P-R INTERVAL: 200 MS
EKG Q-T INTERVAL: 398 MS
EKG QRS DURATION: 76 MS
EKG QTC CALCULATION (BAZETT): 413 MS
EKG R AXIS: -3 DEGREES
EKG T AXIS: 34 DEGREES
EKG VENTRICULAR RATE: 65 BPM
EOSINOPHILS ABSOLUTE: 0 K/UL (ref 0–0.6)
EOSINOPHILS RELATIVE PERCENT: 0.4 %
GFR AFRICAN AMERICAN: >60
GFR NON-AFRICAN AMERICAN: >60
GLOBULIN: 3.6 G/DL
GLUCOSE BLD-MCNC: 94 MG/DL (ref 70–99)
GLUCOSE URINE: NEGATIVE MG/DL
HCT VFR BLD CALC: 46.3 % (ref 40.5–52.5)
HEMOGLOBIN: 16.2 G/DL (ref 13.5–17.5)
KETONES, URINE: 15 MG/DL
LEUKOCYTE ESTERASE, URINE: NEGATIVE
LYMPHOCYTES ABSOLUTE: 0.5 K/UL (ref 1–5.1)
LYMPHOCYTES RELATIVE PERCENT: 16.1 %
MCH RBC QN AUTO: 30.7 PG (ref 26–34)
MCHC RBC AUTO-ENTMCNC: 35 G/DL (ref 31–36)
MCV RBC AUTO: 87.7 FL (ref 80–100)
MICROSCOPIC EXAMINATION: ABNORMAL
MONOCYTES ABSOLUTE: 0.4 K/UL (ref 0–1.3)
MONOCYTES RELATIVE PERCENT: 14.2 %
NEUTROPHILS ABSOLUTE: 2.1 K/UL (ref 1.7–7.7)
NEUTROPHILS RELATIVE PERCENT: 69 %
NITRITE, URINE: NEGATIVE
PDW BLD-RTO: 13.3 % (ref 12.4–15.4)
PH UA: 7 (ref 5–8)
PLATELET # BLD: 143 K/UL (ref 135–450)
PMV BLD AUTO: 8.2 FL (ref 5–10.5)
POTASSIUM REFLEX MAGNESIUM: 4.1 MMOL/L (ref 3.5–5.1)
PROTEIN UA: NEGATIVE MG/DL
RBC # BLD: 5.27 M/UL (ref 4.2–5.9)
SARS-COV-2, NAAT: DETECTED
SODIUM BLD-SCNC: 138 MMOL/L (ref 136–145)
SPECIFIC GRAVITY UA: 1.01 (ref 1–1.03)
TOTAL PROTEIN: 7.7 G/DL (ref 6.4–8.2)
URINE TYPE: ABNORMAL
UROBILINOGEN, URINE: 1 E.U./DL
WBC # BLD: 3.1 K/UL (ref 4–11)

## 2021-06-15 PROCEDURE — 2580000003 HC RX 258: Performed by: EMERGENCY MEDICINE

## 2021-06-15 PROCEDURE — 99213 OFFICE O/P EST LOW 20 MIN: CPT | Performed by: NURSE PRACTITIONER

## 2021-06-15 PROCEDURE — 87635 SARS-COV-2 COVID-19 AMP PRB: CPT

## 2021-06-15 PROCEDURE — 3017F COLORECTAL CA SCREEN DOC REV: CPT | Performed by: NURSE PRACTITIONER

## 2021-06-15 PROCEDURE — G8427 DOCREV CUR MEDS BY ELIG CLIN: HCPCS | Performed by: NURSE PRACTITIONER

## 2021-06-15 PROCEDURE — 1036F TOBACCO NON-USER: CPT | Performed by: NURSE PRACTITIONER

## 2021-06-15 PROCEDURE — 71046 X-RAY EXAM CHEST 2 VIEWS: CPT

## 2021-06-15 PROCEDURE — 6360000002 HC RX W HCPCS: Performed by: EMERGENCY MEDICINE

## 2021-06-15 PROCEDURE — 81003 URINALYSIS AUTO W/O SCOPE: CPT

## 2021-06-15 PROCEDURE — 96375 TX/PRO/DX INJ NEW DRUG ADDON: CPT

## 2021-06-15 PROCEDURE — 93005 ELECTROCARDIOGRAM TRACING: CPT | Performed by: EMERGENCY MEDICINE

## 2021-06-15 PROCEDURE — G8419 CALC BMI OUT NRM PARAM NOF/U: HCPCS | Performed by: NURSE PRACTITIONER

## 2021-06-15 PROCEDURE — 93010 ELECTROCARDIOGRAM REPORT: CPT | Performed by: INTERNAL MEDICINE

## 2021-06-15 PROCEDURE — 85025 COMPLETE CBC W/AUTO DIFF WBC: CPT

## 2021-06-15 PROCEDURE — 99284 EMERGENCY DEPT VISIT MOD MDM: CPT

## 2021-06-15 PROCEDURE — 80053 COMPREHEN METABOLIC PANEL: CPT

## 2021-06-15 PROCEDURE — 6370000000 HC RX 637 (ALT 250 FOR IP): Performed by: EMERGENCY MEDICINE

## 2021-06-15 PROCEDURE — 96374 THER/PROPH/DIAG INJ IV PUSH: CPT

## 2021-06-15 RX ORDER — 0.9 % SODIUM CHLORIDE 0.9 %
1000 INTRAVENOUS SOLUTION INTRAVENOUS ONCE
Status: COMPLETED | OUTPATIENT
Start: 2021-06-15 | End: 2021-06-15

## 2021-06-15 RX ORDER — ONDANSETRON 2 MG/ML
8 INJECTION INTRAMUSCULAR; INTRAVENOUS ONCE
Status: COMPLETED | OUTPATIENT
Start: 2021-06-15 | End: 2021-06-15

## 2021-06-15 RX ORDER — KETOROLAC TROMETHAMINE 30 MG/ML
15 INJECTION, SOLUTION INTRAMUSCULAR; INTRAVENOUS ONCE
Status: COMPLETED | OUTPATIENT
Start: 2021-06-15 | End: 2021-06-15

## 2021-06-15 RX ORDER — ACETAMINOPHEN 325 MG/1
650 TABLET ORAL ONCE
Status: COMPLETED | OUTPATIENT
Start: 2021-06-15 | End: 2021-06-15

## 2021-06-15 RX ADMIN — SODIUM CHLORIDE 1000 ML: 9 INJECTION, SOLUTION INTRAVENOUS at 13:51

## 2021-06-15 RX ADMIN — KETOROLAC TROMETHAMINE 15 MG: 30 INJECTION, SOLUTION INTRAMUSCULAR at 14:03

## 2021-06-15 RX ADMIN — ACETAMINOPHEN 650 MG: 325 TABLET ORAL at 13:50

## 2021-06-15 RX ADMIN — ONDANSETRON 8 MG: 2 INJECTION INTRAMUSCULAR; INTRAVENOUS at 14:03

## 2021-06-15 ASSESSMENT — PATIENT HEALTH QUESTIONNAIRE - PHQ9
SUM OF ALL RESPONSES TO PHQ9 QUESTIONS 1 & 2: 2
1. LITTLE INTEREST OR PLEASURE IN DOING THINGS: 1
2. FEELING DOWN, DEPRESSED OR HOPELESS: 1
SUM OF ALL RESPONSES TO PHQ QUESTIONS 1-9: 2

## 2021-06-15 ASSESSMENT — PAIN SCALES - GENERAL
PAINLEVEL_OUTOF10: 7
PAINLEVEL_OUTOF10: 6
PAINLEVEL_OUTOF10: 7
PAINLEVEL_OUTOF10: 7

## 2021-06-15 ASSESSMENT — ENCOUNTER SYMPTOMS
COUGH: 1
VOMITING: 0
ABDOMINAL PAIN: 1
NAUSEA: 1
WHEEZING: 0
BACK PAIN: 1
GASTROINTESTINAL NEGATIVE: 1
COUGH: 1
SHORTNESS OF BREATH: 1

## 2021-06-15 NOTE — PROGRESS NOTES
Patient: Constanza Roland is a 61 y.o. male who presents today with the following Chief Complaint(s):  Chief Complaint   Patient presents with    Cough     raspy cough     Fatigue     very weak, had a fever the first night, chills, very dizzy, nausea, x3 days          HPI   Started 3 days ago with a bad dry cough but felt like he had something in his throat. Had fever of 100 and took tylenol with that. Still body aches, SOB, really fatigued, dizzy, nausea- no vomiting. Has been trying to drink fluids   Current Outpatient Medications   Medication Sig Dispense Refill    verapamil (CALAN SR) 180 MG extended release tablet TAKE 1 TABLET (180 MG) BY MOUTH NIGHTLY 30 tablet 3    ibuprofen (ADVIL;MOTRIN) 800 MG tablet TAKE 1 TABLET BY MOUTH EVERY 6 HOURS AS NEEDED FOR PAIN WITH FOOD 120 tablet 3    losartan (COZAAR) 100 MG tablet TAKE 1 TABLET (100 MG) BY MOUTH EVERY DAY 30 tablet 5    levothyroxine (SYNTHROID) 25 MCG tablet TAKE 1 TABLET (25 MCG) BY MOUTH EVERY DAY 30 tablet 0    doxepin (SINEQUAN) 50 MG capsule Take 2 capsules by mouth nightly      tamsulosin (FLOMAX) 0.4 MG capsule Take 1 capsule by mouth daily      ondansetron (ZOFRAN-ODT) 4 MG disintegrating tablet Take 1 tablet by mouth every 8 hours as needed for Nausea 20 tablet 3    amphetamine-dextroamphetamine (ADDERALL) 30 MG tablet Take 60 mg by mouth 2 times daily Two 30 mg tabs bid      alprazolam (XANAX) 0.5 MG tablet Take 1 mg by mouth nightly as needed for Sleep Takes three nightly       No current facility-administered medications for this visit. Patient's past medical history, surgical history, family history, medications,  andallergies  were all reviewed and updated as appropriate today. Review of Systems   Constitutional: Positive for appetite change, fatigue and fever. Chills: having to force water and food. Respiratory: Positive for cough and shortness of breath. Negative for wheezing.     Cardiovascular: Positive for chest pain (off and on-not severe) and palpitations. Gastrointestinal: Positive for abdominal pain and nausea. Negative for vomiting. Musculoskeletal: Positive for back pain. Neurological: Positive for dizziness. Psychiatric/Behavioral: Positive for sleep disturbance. Physical Exam  Vitals and nursing note reviewed. Constitutional:       Appearance: He is well-developed. He is ill-appearing. HENT:      Head: Normocephalic and atraumatic. Right Ear: External ear normal.      Left Ear: External ear normal.      Nose: Nose normal.      Mouth/Throat:      Pharynx: No oropharyngeal exudate or posterior oropharyngeal erythema. Eyes:      Conjunctiva/sclera: Conjunctivae normal.   Cardiovascular:      Rate and Rhythm: Normal rate and regular rhythm. Heart sounds: Normal heart sounds. No murmur heard. Pulmonary:      Effort: Pulmonary effort is normal. No respiratory distress. Breath sounds: Normal breath sounds. No wheezing or rales. Musculoskeletal:         General: Normal range of motion. Cervical back: Normal range of motion. Lymphadenopathy:      Cervical: No cervical adenopathy. Skin:     General: Skin is warm and dry. Neurological:      General: No focal deficit present. Mental Status: He is alert and oriented to person, place, and time. Deep Tendon Reflexes: Reflexes are normal and symmetric. Psychiatric:         Behavior: Behavior normal.         Thought Content: Thought content normal.         Judgment: Judgment normal.      Comments: Seemed very fatigued and weak- flat affect       Vitals:    06/15/21 1135   BP: 122/84   Pulse: 73   SpO2: 98%       Assessment:  Encounter Diagnoses   Name Primary?  Fatigue, unspecified type Yes    SOB (shortness of breath)        Plan:  1.  Fatigue, unspecified type  Vitals were stable but with him being so fatigued sent him to ER- wife was with him and felt comfortable to drive him there   Possible pneumonia or other illness     2. SOB (shortness of breath)          No follow-ups on file.

## 2021-06-15 NOTE — ED NOTES
I called pt wife made her aware that she could not come back into the Hospital. Pt positive for COVID.      Rosaura Schultz, LPN  [id number]/[phone]

## 2021-06-15 NOTE — ED PROVIDER NOTES
Pack years: 24.00     Quit date: 2000     Years since quittin.4    Smokeless tobacco: Never Used   Vaping Use    Vaping Use: Never used   Substance and Sexual Activity    Alcohol use: Yes     Alcohol/week: 0.0 standard drinks     Comment: occas    Drug use: No     Comment: has medical MJ card- has not been using that    Sexual activity: Yes     Partners: Female   Other Topics Concern    None   Social History Narrative    None     Social Determinants of Health     Financial Resource Strain:     Difficulty of Paying Living Expenses:    Food Insecurity:     Worried About Running Out of Food in the Last Year:     Ran Out of Food in the Last Year:    Transportation Needs:     Lack of Transportation (Medical):  Lack of Transportation (Non-Medical):    Physical Activity:     Days of Exercise per Week:     Minutes of Exercise per Session:    Stress:     Feeling of Stress :    Social Connections:     Frequency of Communication with Friends and Family:     Frequency of Social Gatherings with Friends and Family:     Attends Scientologist Services:     Active Member of Clubs or Organizations:     Attends Club or Organization Meetings:     Marital Status:    Intimate Partner Violence:     Fear of Current or Ex-Partner:     Emotionally Abused:     Physically Abused:     Sexually Abused:      SCREENINGS         PHYSICAL EXAM  (up to 7 for level 4, 8 or more for level 5)   INITIAL VITALS: BP: (!) 158/89, Temp: 98.3 °F (36.8 °C), Pulse: 68, Resp: 16, SpO2: 100 %   Physical Exam  Vitals reviewed. Constitutional:       Appearance: He is ill-appearing (appears to feel unwell). He is not toxic-appearing or diaphoretic. HENT:      Head: Normocephalic and atraumatic. Right Ear: External ear normal.      Left Ear: External ear normal.   Eyes:      General: No scleral icterus. Right eye: No discharge. Left eye: No discharge.       Conjunctiva/sclera: Conjunctivae normal.   Neck: Trachea: No tracheal deviation. Cardiovascular:      Rate and Rhythm: Normal rate. Pulses: Normal pulses. Pulmonary:      Effort: Pulmonary effort is normal. No respiratory distress. Breath sounds: No wheezing, rhonchi or rales. Abdominal:      General: There is no distension. Tenderness: There is no abdominal tenderness. There is no right CVA tenderness, left CVA tenderness or guarding. Musculoskeletal:      Cervical back: Normal range of motion. Right lower leg: No edema. Left lower leg: No edema. Skin:     General: Skin is warm and dry. Capillary Refill: Capillary refill takes 2 to 3 seconds. Neurological:      General: No focal deficit present. Mental Status: He is alert and oriented to person, place, and time. Psychiatric:         Mood and Affect: Mood normal.         Behavior: Behavior normal.       DIAGNOSTIC RESULTS   EKG: interpreted by the Emergency Department Physician who either signs or Co-signs this chart in the absence of a cardiologist.  Indication: fatigue  Interpretation: Rate 65, rhythm sinus with first-degree AV block, axis normal.  AK prolonged 200, QRS/QTc within normal limits. No T/ST changes consistent with acute ischemia. Comparison to prior EKG from April 18, 2016 shows no significant change. RADIOLOGY:   Interpretation per Radiologist below, if available at the time of this note:  XR CHEST (2 VW)   Final Result   No radiographic evidence of acute pulmonary disease.            LABS:  Labs Reviewed   COVID-19, RAPID - Abnormal; Notable for the following components:       Result Value    SARS-CoV-2, NAAT DETECTED (*)     All other components within normal limits    Narrative:     Brian Rowan tel. [phone],  Microbiology results called to and read back by myla bennett, 06/15/2021  14:24, by Calixto Hayden  Performed at:  [address]   Phone [phone]   CBC WITH AUTO DIFFERENTIAL - Abnormal; Notable for the following components:    WBC 3.1 (*)     Lymphocytes Absolute 0.5 (*)     All other components within normal limits    Narrative:     Performed at:  [address], [facility] [place][note 2] [employer]   Phone [phone] - Abnormal; Notable for the following components:    Ketones, Urine 15 (*)     All other components within normal limits    Narrative:     Performed at:  [address], [facility] [place]note 2] [employer]   Phone [phone]   COMPREHENSIVE METABOLIC PANEL W/ REFLEX TO MG FOR LOW K    Narrative:     Performed at:  [address], [facility] [place]note 2] [employer]   Phone [phone]       EMERGENCY DEPARTMENT COURSE and DIFFERENTIAL DIAGNOSIS/MDM:   Patient was given the following medications:  Orders Placed This Encounter   Medications    0.9 % sodium chloride bolus    ketorolac (TORADOL) injection 15 mg    ondansetron (ZOFRAN) injection 8 mg    acetaminophen (TYLENOL) tablet 650 mg     CONSULTS:  None    INITIAL VITALS: BP: (!) 158/89, Temp: 98.3 °F (36.8 °C), Pulse: 68, Resp: 16, SpO2: 100 %     Natalie Curiel is a 61 y.o. male who presents to the emergency department secondary to concern for feeling unwell for the last 3 days. On arrival he is awake, alert, oriented. Vitals notable for blood pressure 158/89, he does have a history of hypertension. Physical exam with clear lungs, benign abdomen, no peripheral edema noted. Does have a mildly delayed capillary refill proximally 3 seconds. He does appear to feel unwell. A peripheral IV was placed, labs were ordered in triage. They had resulted by the time I evaluated patient. Notable for low white count otherwise unremarkable. Given his fatigue, age, and risk factors I did add on an EKG which was unchanged. Chest x-ray unremarkable.   After my evaluation I added on a liter of fluids, Toradol, Tylenol, Zofran, as well as a Covid test, and he was p.o. challenged. On reassessment patient reported he was still feeling relatively unwell. We discussed his Covid test had returned positive for Covid. Repeat O2 saturation remained 99% on room air and did not decrease even with ambulation. Blood pressure remained mildly elevated though improved now 144/90. We discussed symptomatic treatment as well as natural progression of Covid and isolation/quarantine which he expressed understanding of prior to discharge. At that time he had been in p.o. challenged without any issues. Wife was also updated over the phone. FINAL IMPRESSION      1. COVID-19    2. Other fatigue    3. Cough    4.  Muscular aches        DISPOSITION/PLAN   DISPOSITION        PATIENT REFERRED TO:  NATALEE Posada - [facility]  [address] [address]  [phone]    Schedule an appointment as soon as possible for a visit   For follow up appointment      DISCHARGE MEDICATIONS:  New Prescriptions    No medications on file            (Please note that portions of this note were completed with a voice recognition program. Efforts were made to edit the dictations but occasionally words are mis-transcribed.)    Santiago Mora MD (electronically signed)  Attending Emergency Physician         Santiago Mora MD  06/15/21 1778

## 2021-06-16 ENCOUNTER — CARE COORDINATION (OUTPATIENT)
Dept: CARE COORDINATION | Age: 61
End: 2021-06-16

## 2021-06-16 NOTE — CARE COORDINATION
Placed call to patient to follow up on recent ED visit. HIPPA compliant message left with RN contact information.     Garland Dunham RN, MSN  Ambulatory Care Manager  [phone]

## 2021-06-17 ENCOUNTER — CARE COORDINATION (OUTPATIENT)
Dept: CARE COORDINATION | Age: 61
End: 2021-06-17

## 2021-06-17 NOTE — CARE COORDINATION
ACM attempted ER  outreach. Left message and sent education and resources via [employer]. Contact information for call back provided. 2nd attempt at outreach.

## 2021-06-18 ENCOUNTER — CARE COORDINATION (OUTPATIENT)
Dept: CARE COORDINATION | Age: 61
End: 2021-06-18

## 2021-07-03 DIAGNOSIS — I10 ESSENTIAL HYPERTENSION: ICD-10-CM

## 2021-07-06 RX ORDER — LOSARTAN POTASSIUM 100 MG/1
TABLET ORAL
Qty: 30 TABLET | Refills: 5 | Status: SHIPPED | OUTPATIENT
Start: 2021-07-06 | End: 2021-12-29

## 2021-08-04 RX ORDER — IBUPROFEN 800 MG/1
TABLET ORAL
Qty: 120 TABLET | Refills: 3 | Status: SHIPPED | OUTPATIENT
Start: 2021-08-04 | End: 2021-12-01

## 2021-10-04 DIAGNOSIS — I10 ESSENTIAL HYPERTENSION: ICD-10-CM

## 2021-12-01 RX ORDER — IBUPROFEN 800 MG/1
TABLET ORAL
Qty: 120 TABLET | Refills: 3 | Status: SHIPPED | OUTPATIENT
Start: 2021-12-01 | End: 2022-03-24

## 2021-12-01 NOTE — TELEPHONE ENCOUNTER
Last Office Visit  -  6/15/21  Next Office Visit  -  n/a    Last Filled  -    Last UDS -    Contract -

## 2021-12-29 DIAGNOSIS — I10 ESSENTIAL HYPERTENSION: ICD-10-CM

## 2021-12-29 RX ORDER — LOSARTAN POTASSIUM 100 MG/1
TABLET ORAL
Qty: 30 TABLET | Refills: 5 | Status: SHIPPED | OUTPATIENT
Start: 2021-12-29 | End: 2022-07-18

## 2021-12-29 NOTE — TELEPHONE ENCOUNTER
Last Office Visit  -  6/15/21  Next Office Visit  -  none    Last Filled  -    Last UDS -    Contract -

## 2022-01-24 ENCOUNTER — OFFICE VISIT (OUTPATIENT)
Dept: FAMILY MEDICINE CLINIC | Age: 62
End: 2022-01-24
Payer: MEDICARE

## 2022-01-24 VITALS
DIASTOLIC BLOOD PRESSURE: 92 MMHG | HEIGHT: 70 IN | BODY MASS INDEX: 27.03 KG/M2 | SYSTOLIC BLOOD PRESSURE: 162 MMHG | HEART RATE: 61 BPM | OXYGEN SATURATION: 99 % | WEIGHT: 188.8 LBS

## 2022-01-24 DIAGNOSIS — I10 PRIMARY HYPERTENSION: ICD-10-CM

## 2022-01-24 DIAGNOSIS — Z93.3 COLOSTOMY PRESENT (HCC): ICD-10-CM

## 2022-01-24 DIAGNOSIS — R10.84 GENERALIZED ABDOMINAL PAIN: ICD-10-CM

## 2022-01-24 DIAGNOSIS — E03.9 HYPOTHYROIDISM, UNSPECIFIED TYPE: ICD-10-CM

## 2022-01-24 DIAGNOSIS — I10 ESSENTIAL HYPERTENSION: ICD-10-CM

## 2022-01-24 DIAGNOSIS — R73.03 PREDIABETES: ICD-10-CM

## 2022-01-24 DIAGNOSIS — R11.0 NAUSEA: ICD-10-CM

## 2022-01-24 DIAGNOSIS — M54.42 CHRONIC BILATERAL LOW BACK PAIN WITH BILATERAL SCIATICA: ICD-10-CM

## 2022-01-24 DIAGNOSIS — Z00.00 ROUTINE GENERAL MEDICAL EXAMINATION AT A HEALTH CARE FACILITY: Primary | ICD-10-CM

## 2022-01-24 DIAGNOSIS — Z13.1 SCREENING FOR DIABETES MELLITUS: ICD-10-CM

## 2022-01-24 DIAGNOSIS — G89.29 CHRONIC RIGHT SHOULDER PAIN: ICD-10-CM

## 2022-01-24 DIAGNOSIS — G89.29 CHRONIC BILATERAL LOW BACK PAIN WITH BILATERAL SCIATICA: ICD-10-CM

## 2022-01-24 DIAGNOSIS — Z13.220 SCREENING FOR HYPERLIPIDEMIA: ICD-10-CM

## 2022-01-24 DIAGNOSIS — M25.511 CHRONIC RIGHT SHOULDER PAIN: ICD-10-CM

## 2022-01-24 DIAGNOSIS — M54.41 CHRONIC BILATERAL LOW BACK PAIN WITH BILATERAL SCIATICA: ICD-10-CM

## 2022-01-24 PROCEDURE — G0439 PPPS, SUBSEQ VISIT: HCPCS | Performed by: NURSE PRACTITIONER

## 2022-01-24 PROCEDURE — G8484 FLU IMMUNIZE NO ADMIN: HCPCS | Performed by: NURSE PRACTITIONER

## 2022-01-24 PROCEDURE — 3017F COLORECTAL CA SCREEN DOC REV: CPT | Performed by: NURSE PRACTITIONER

## 2022-01-24 RX ORDER — ONDANSETRON 4 MG/1
TABLET, FILM COATED ORAL
COMMUNITY
Start: 2021-12-29 | End: 2022-01-24 | Stop reason: ALTCHOICE

## 2022-01-24 RX ORDER — ALPRAZOLAM 1 MG/1
TABLET ORAL
COMMUNITY
Start: 2021-12-29

## 2022-01-24 RX ORDER — SILDENAFIL CITRATE 20 MG/1
TABLET ORAL
COMMUNITY
Start: 2021-12-24

## 2022-01-24 RX ORDER — ONDANSETRON 4 MG/1
4 TABLET, ORALLY DISINTEGRATING ORAL EVERY 8 HOURS PRN
Qty: 30 TABLET | Refills: 11 | Status: SHIPPED | OUTPATIENT
Start: 2022-01-24

## 2022-01-24 RX ORDER — VERAPAMIL HYDROCHLORIDE 240 MG/1
240 TABLET, FILM COATED, EXTENDED RELEASE ORAL NIGHTLY
Qty: 90 TABLET | Refills: 2 | Status: SHIPPED | OUTPATIENT
Start: 2022-01-24

## 2022-01-24 ASSESSMENT — PATIENT HEALTH QUESTIONNAIRE - PHQ9
SUM OF ALL RESPONSES TO PHQ QUESTIONS 1-9: 2
2. FEELING DOWN, DEPRESSED OR HOPELESS: 2
SUM OF ALL RESPONSES TO PHQ QUESTIONS 1-9: 2
SUM OF ALL RESPONSES TO PHQ QUESTIONS 1-9: 2
SUM OF ALL RESPONSES TO PHQ9 QUESTIONS 1 & 2: 2
SUM OF ALL RESPONSES TO PHQ QUESTIONS 1-9: 2
1. LITTLE INTEREST OR PLEASURE IN DOING THINGS: 0

## 2022-01-24 ASSESSMENT — LIFESTYLE VARIABLES: HOW OFTEN DO YOU HAVE A DRINK CONTAINING ALCOHOL: 0

## 2022-01-24 NOTE — PATIENT INSTRUCTIONS
Personalized Preventive Plan for Doreen Failing - 1/24/2022  Medicare offers a range of preventive health benefits. Some of the tests and screenings are paid in full while other may be subject to a deductible, co-insurance, and/or copay. Some of these benefits include a comprehensive review of your medical history including lifestyle, illnesses that may run in your family, and various assessments and screenings as appropriate. After reviewing your medical record and screening and assessments performed today your provider may have ordered immunizations, labs, imaging, and/or referrals for you. A list of these orders (if applicable) as well as your Preventive Care list are included within your After Visit Summary for your review. Other Preventive Recommendations:    · A preventive eye exam performed by an eye specialist is recommended every 1-2 years to screen for glaucoma; cataracts, macular degeneration, and other eye disorders. · A preventive dental visit is recommended every 6 months. · Try to get at least 150 minutes of exercise per week or 10,000 steps per day on a pedometer . · Order or download the FREE \"Exercise & Physical Activity: Your Everyday Guide\" from [employer]. Call 6-758.591.2736 or search [employer] online. · You need 8757-6567 mg of calcium and 6227-3021 IU of vitamin D per day. It is possible to meet your calcium requirement with diet alone, but a vitamin D supplement is usually necessary to meet this goal.  · When exposed to the sun, use a sunscreen that protects against both UVA and UVB radiation with an SPF of 30 or greater. Reapply every 2 to 3 hours or after sweating, drying off with a towel, or swimming. · Always wear a seat belt when traveling in a car. Always wear a helmet when riding a bicycle or motorcycle.

## 2022-01-24 NOTE — PROGRESS NOTES
Medicare Annual Wellness Visit  Name: Reynaldo Ludwig Date: 2022   MRN: <> Sex: Male   Age: 64 y.o. Ethnicity: Non- / Non    : 1960 Race: White (non-)      Hina Calderon is here for Medicare AWV (AWV)    Screenings for behavioral, psychosocial and functional/safety risks, and cognitive dysfunction are all negative except as indicated below. These results, as well as other patient data from the [address] form, are documented in Flowsheets linked to this Encounter. Allergies   Allergen Reactions    Morphine Sulfate Nausea Only     Causes nightmares         Prior to Visit Medications    Medication Sig Taking? Authorizing Provider   sildenafil (REVATIO) 20 MG tablet TAKE 1-5 TABLET BY MOUTH AS DIRECTED 30 MINUTES PRIOR TO INTERCOURSE. TAKE 6HRS APART FROM FLOMAX.  Yes Historical Provider, MD   ALPRAZolam (XANAX) 1 MG tablet TAKE ONE TABLET (1 MG) BY MOUTH TWICE DAILY AND THREE TABLET (3 MG) BY MOUTH AT BEDTIME Yes Historical Provider, MD   ondansetron (ZOFRAN-ODT) 4 MG disintegrating tablet Take 1 tablet by mouth every 8 hours as needed for Nausea or Vomiting Yes NATALEE Martinez CNP   verapamil (CALAN SR) 240 MG extended release tablet Take 1 tablet by mouth nightly Yes NATALEE Martinez CNP   levothyroxine (SYNTHROID) 25 MCG tablet TAKE 1 TABLET (25 MCG) BY MOUTH EVERY DAY Yes NATALEE Martinez CNP   losartan (COZAAR) 100 MG tablet TAKE 1 TABLET (100 MG) BY MOUTH EVERY DAY Yes NATALEE Martinez CNP   ibuprofen (ADVIL;MOTRIN) 800 MG tablet TAKE 1 TABLET BY MOUTH EVERY 6 HOURS AS NEEDED FOR PAIN WITH FOOD Yes NATALEE Barillas CNP   tamsulosin (FLOMAX) 0.4 MG capsule Take 1 capsule by mouth daily Yes Historical Provider, MD   amphetamine-dextroamphetamine (ADDERALL) 30 MG tablet Take 60 mg by mouth 2 times daily Two 30 mg tabs bid Yes Historical Provider, MD         Past Medical History:   Diagnosis Date    Abdominal pain     Acquired hypothyroidism 11/03/2016    ADHD (attention deficit hyperactivity disorder)     Anxiety     Arthritis     Back pain     Chronic pain syndrome     COVID-19 06/15/2021    DDD (degenerative disc disease), cervical     DDD (degenerative disc disease), lumbar     Depression     ED (erectile dysfunction) 10/08/2014    FHx: migraine headaches     Hematuria     History of pulmonary embolism     HTN (hypertension) 02/08/2012    Hx of blood clots     Joint prosthesis complication (HCC)     Lumbar radiculitis     Narcotic abuse ([address] 03/20/2018    Personal history of PE (pulmonary embolism) 02/08/2012    Right shoulder pain     Rotator cuff syndrome of right shoulder     right shoulder rotator cuff release       Past Surgical History:   Procedure Laterality Date    ACHILLES TENDON SURGERY      right    APPENDECTOMY      BLADDER SURGERY      fistula x2    CARPAL TUNNEL RELEASE  11/20/12    Release of Right Transverse Carpal Tunnel Ligament    COLON SURGERY      COLONOSCOPY N/A 2/25/2020    COLON (9:00) performed by Mehreen Marie MD at [address] [facility]  3/13/14    HERNIA REPAIR  11/20/14    lysis of adhesions    JOINT REPLACEMENT Bilateral     KNEE ARTHROPLASTY  [id number]    right total    KNEE ARTHROSCOPY      left    OTHER SURGICAL HISTORY  11/21/12    Biopsy of Lesion in Colostomy    REMOVAL OF FECAL IMPACTION N/A 4/13/2021    EXAM UNDER ANESTHESIA, FLEXIBLE SIGMOIDOSCOPY, DISIMPACTION OF RECTUM UNDER ANESTHESIA performed by Valerio Ryan MD at [facility] Left 3/13/14    Removal of polyps    ROTATOR CUFF REPAIR      right    SIGMOIDOSCOPY N/A 3/12/2020    SIGMOIDOSCOPY DIAGNOSTIC FLEXIBLE (10:20) [facility] 11/30/2016         Family History   Problem Relation Age of Onset    Heart Disease Mother     Heart Disease Father         had bypass    Parkinsonism Father     Asthma Sister     Brain Cancer Brother     Asthma Son        CareTeam (Including outside providers/suppliers regularly involved in providing care):   Patient Care Team:  NATALEE Phillips CNP as PCP - General (Nurse Practitioner Family)  NATALEE Phillips CNP as PCP - [facility] Empaneled Provider  Karlo Maciel MD (Orthopedic Surgery)  Judit Crowley MD (Orthopedic Surgery)  Carolyne Rodrigez as Physician Assistant (Orthopedic Surgery)    Wt Readings from Last 3 Encounters:   01/24/22 188 lb 12.8 oz (85.6 kg)   06/15/21 197 lb (89.4 kg)   06/15/21 196 lb (88.9 kg)     Vitals:    01/24/22 1056 01/24/22 1122   BP: (!) 158/98 (!) 162/92   Site:  Left Upper Arm   Pulse: 61    SpO2: 99%    Weight: 188 lb 12.8 oz (85.6 kg)    Height: 5' 10\" (1.778 m)      Body mass index is 27.09 kg/m². Based upon direct observation of the patient, evaluation of cognition reveals recent and remote memory intact. Patient's complete Health Risk Assessment and screening values have been reviewed and are found in Flowsheets. The following problems were reviewed today and where indicated follow up appointments were made and/or referrals ordered.       Positive Risk Factor Screenings with Interventions:             Health Habits/Nutrition:  Health Habits/Nutrition  Do you exercise for at least 20 minutes 2-3 times per week?: Yes  Have you lost any weight without trying in the past 3 months?: (!) Yes  Do you eat only one meal per day?: (!) Yes  Have you seen the dentist within the past year?: (!) No  Body mass index: (!) 27.09  Health Habits/Nutrition Interventions:  · from nausea    Hearing/Vision:  No exam data present  Hearing/Vision  Do you or your family notice any trouble with your hearing that hasn't been managed with hearing aids?: (!) Yes  Do you have difficulty driving, watching TV, or doing any of your daily activities because of your eyesight?: No  Have you had an eye exam within the past year?: (!) No  Hearing/Vision Interventions:  · Hearing concerns:  patient declines any further evaluation/treatment for hearing issues   · Will make eye exam     Safety:  Safety  Do you have working smoke detectors?: Yes  Have all throw rugs been removed or fastened?: Yes  Do you have non-slip mats or surfaces in all bathtubs/showers?: (!) No  Do all of your stairways have a railing or banister?: Yes  Are your doorways, halls and stairs free of clutter?: Yes  Do you always fasten your seatbelt when you are in a car?: Yes  Safety Interventions:  · Home safety tips provided       Personalized Preventive Plan   Current Health Maintenance Status  Immunization History   Administered Date(s) Administered    Influenza Virus Vaccine 10/08/2014    Influenza, Kelvin Alicia IM, (6 mo and older Fluzone, Flulaval, Fluarix and 3 yrs and older Afluria) 11/03/2016    Pneumococcal Polysaccharide ([id number]) 11/22/2014, 11/03/2016        Health Maintenance   Topic Date Due    COVID-19 Vaccine (1) Never done    Shingles Vaccine (1 of 2) Never done    A1C test (Diabetic or Prediabetic)  08/29/2019    TSH testing  10/26/2021    Annual Wellness Visit (AWV)  10/27/2021    DTaP/Tdap/Td vaccine (1 - Tdap) 01/24/2023 (Originally 6/25/1979)    Flu vaccine (1) 01/24/2023 (Originally 9/1/2021)    Depression Screen  06/15/2022    Potassium monitoring  06/15/2022    Creatinine monitoring  06/15/2022    Lipid screen  10/26/2025    Colon cancer screen colonoscopy  02/25/2030    Hepatitis A vaccine  Aged Out    Hepatitis B vaccine  Aged Out    Hib vaccine  Aged Out    Meningococcal (ACWY) vaccine  Aged Out    Pneumococcal 0-64 years Vaccine  Aged Out    Hepatitis C screen  Discontinued    HIV screen  Discontinued     Recommendations for Preventive Services Due: see orders and patient instructions/AVS.  .   Recommended screening schedule for the next 5-10 years is provided to the patient in written form: see Patient Instructions/AVS.    Can Sanchez was seen today for medicare awv. Diagnoses and all orders for this visit:    Chronic right shoulder pain  -     Mary Rao MD, Orthopedic Surgery, [facility]    Nausea  -     ondansetron (ZOFRAN-ODT) 4 MG disintegrating tablet; Take 1 tablet by mouth every 8 hours as needed for Nausea or Vomiting    Chronic bilateral low back pain with bilateral sciatica  -     AFL ([facility]) - Lorena Paris MD, Pain Management, [place]    Hypothyroidism, unspecified type  -     TSH without Reflex  -     T4, Free    Primary hypertension  -     CBC Auto Differential  -     Comprehensive Metabolic Panel    Screening for hyperlipidemia  -     Lipid Panel    Generalized abdominal pain  -     AFL - Sully Be MD, Gastroenterology, [facility]    Colostomy present [facility])  -     Nettie Murry MD, Gastroenterology, [facility]    Essential hypertension  -     verapamil (CALAN SR) 240 MG extended release tablet;  Take 1 tablet by mouth nightly    Routine general medical examination at a health care facility    Screening for diabetes mellitus    Prediabetes  -     Hemoglobin A1C    Other orders  -     Cancel: Zoster recombinant [facility])

## 2022-01-26 DIAGNOSIS — E03.9 ACQUIRED HYPOTHYROIDISM: ICD-10-CM

## 2022-01-26 RX ORDER — LEVOTHYROXINE SODIUM 0.03 MG/1
TABLET ORAL
Qty: 30 TABLET | Refills: 0 | Status: SHIPPED | OUTPATIENT
Start: 2022-01-26 | End: 2022-02-25

## 2022-01-26 NOTE — TELEPHONE ENCOUNTER
Last Office Visit  -  1/24/22  Next Office Visit  -  2/24/22    Last Filled  -    Last UDS -    Contract -

## 2022-01-28 ENCOUNTER — TELEPHONE (OUTPATIENT)
Dept: FAMILY MEDICINE CLINIC | Age: 62
End: 2022-01-28

## 2022-01-28 DIAGNOSIS — M54.9 CHRONIC BILATERAL BACK PAIN, UNSPECIFIED BACK LOCATION: Primary | ICD-10-CM

## 2022-01-28 DIAGNOSIS — G89.29 CHRONIC BILATERAL BACK PAIN, UNSPECIFIED BACK LOCATION: Primary | ICD-10-CM

## 2022-01-28 NOTE — TELEPHONE ENCOUNTER
Patient's wife called to let Chelsea Bentley know that they went to see Mike Rader and he told him there is nothing he can do for him. Lorna Bach began to look for other physicians for pain management by their home because she said she is watching the patient's quality of life deteriorate. She is requesting a referral to Reji Rayo on [address] with the office notes from his last 3 visits.   Fax #[phone]

## 2022-01-31 NOTE — TELEPHONE ENCOUNTER
I placed a referral for external referral pain management- please fax to number from message along with the last 3 notes from here

## 2022-02-24 DIAGNOSIS — E03.9 ACQUIRED HYPOTHYROIDISM: ICD-10-CM

## 2022-02-25 RX ORDER — LEVOTHYROXINE SODIUM 0.03 MG/1
TABLET ORAL
Qty: 30 TABLET | Refills: 0 | Status: SHIPPED | OUTPATIENT
Start: 2022-02-25 | End: 2022-03-24

## 2022-03-24 DIAGNOSIS — E03.9 ACQUIRED HYPOTHYROIDISM: ICD-10-CM

## 2022-03-24 RX ORDER — IBUPROFEN 800 MG/1
TABLET ORAL
Qty: 120 TABLET | Refills: 3 | Status: SHIPPED | OUTPATIENT
Start: 2022-03-24 | End: 2022-07-18

## 2022-03-24 RX ORDER — LEVOTHYROXINE SODIUM 0.03 MG/1
TABLET ORAL
Qty: 30 TABLET | Refills: 0 | Status: SHIPPED | OUTPATIENT
Start: 2022-03-24 | End: 2022-04-22

## 2022-03-24 NOTE — TELEPHONE ENCOUNTER
Last Office Visit  -  1/24/22  Next Office Visit  -  n/a     Last Filled  -    Last UDS -    Contract -

## 2022-04-21 DIAGNOSIS — E03.9 ACQUIRED HYPOTHYROIDISM: ICD-10-CM

## 2022-04-22 RX ORDER — LEVOTHYROXINE SODIUM 0.03 MG/1
TABLET ORAL
Qty: 30 TABLET | Refills: 5 | Status: SHIPPED | OUTPATIENT
Start: 2022-04-22 | End: 2022-10-14

## 2022-05-12 ENCOUNTER — TELEPHONE (OUTPATIENT)
Dept: FAMILY MEDICINE CLINIC | Age: 62
End: 2022-05-12

## 2022-07-18 DIAGNOSIS — I10 ESSENTIAL HYPERTENSION: ICD-10-CM

## 2022-07-18 RX ORDER — LOSARTAN POTASSIUM 100 MG/1
TABLET ORAL
Qty: 30 TABLET | Refills: 5 | Status: SHIPPED | OUTPATIENT
Start: 2022-07-18

## 2022-07-18 RX ORDER — IBUPROFEN 800 MG/1
TABLET ORAL
Qty: 120 TABLET | Refills: 3 | Status: SHIPPED | OUTPATIENT
Start: 2022-07-18

## 2022-10-14 DIAGNOSIS — E03.9 ACQUIRED HYPOTHYROIDISM: ICD-10-CM

## 2022-10-14 RX ORDER — LEVOTHYROXINE SODIUM 0.03 MG/1
TABLET ORAL
Qty: 30 TABLET | Refills: 5 | Status: SHIPPED | OUTPATIENT
Start: 2022-10-14

## 2022-11-14 RX ORDER — IBUPROFEN 800 MG/1
TABLET ORAL
Qty: 120 TABLET | Refills: 3 | Status: SHIPPED | OUTPATIENT
Start: 2022-11-14

## 2022-11-14 NOTE — TELEPHONE ENCOUNTER
Last Office Visit  -  01/24/2022  Next Office Visit  -  n/a    Last Filled  -    Last UDS -    Contract -

## 2022-12-12 DIAGNOSIS — I10 ESSENTIAL HYPERTENSION: ICD-10-CM

## 2022-12-12 DIAGNOSIS — R11.0 NAUSEA: ICD-10-CM

## 2022-12-12 RX ORDER — LOSARTAN POTASSIUM 100 MG/1
TABLET ORAL
Qty: 30 TABLET | Refills: 5 | Status: SHIPPED | OUTPATIENT
Start: 2022-12-12

## 2022-12-12 RX ORDER — LOSARTAN POTASSIUM 100 MG/1
TABLET ORAL
Qty: 30 TABLET | Refills: 5 | Status: SHIPPED | OUTPATIENT
Start: 2022-12-12 | End: 2022-12-12

## 2022-12-12 RX ORDER — ONDANSETRON 4 MG/1
TABLET, ORALLY DISINTEGRATING ORAL
Qty: 30 TABLET | Refills: 11 | Status: SHIPPED | OUTPATIENT
Start: 2022-12-12

## 2022-12-12 RX ORDER — ONDANSETRON 4 MG/1
TABLET, ORALLY DISINTEGRATING ORAL
Qty: 30 TABLET | Refills: 11 | Status: SHIPPED | OUTPATIENT
Start: 2022-12-12 | End: 2022-12-12

## 2023-04-12 DIAGNOSIS — E03.9 ACQUIRED HYPOTHYROIDISM: ICD-10-CM

## 2023-04-12 RX ORDER — LEVOTHYROXINE SODIUM 0.03 MG/1
TABLET ORAL
Qty: 30 TABLET | Refills: 0 | Status: SHIPPED | OUTPATIENT
Start: 2023-04-12

## 2023-05-10 DIAGNOSIS — E03.9 ACQUIRED HYPOTHYROIDISM: ICD-10-CM

## 2023-05-10 RX ORDER — LEVOTHYROXINE SODIUM 0.03 MG/1
TABLET ORAL
Qty: 30 TABLET | Refills: 0 | Status: SHIPPED | OUTPATIENT
Start: 2023-05-10

## 2023-06-01 ASSESSMENT — PATIENT HEALTH QUESTIONNAIRE - PHQ9
SUM OF ALL RESPONSES TO PHQ9 QUESTIONS 1 & 2: 5
SUM OF ALL RESPONSES TO PHQ QUESTIONS 1-9: 21
SUM OF ALL RESPONSES TO PHQ9 QUESTIONS 1 & 2: 5
4. FEELING TIRED OR HAVING LITTLE ENERGY: 3
4. FEELING TIRED OR HAVING LITTLE ENERGY: NEARLY EVERY DAY
7. TROUBLE CONCENTRATING ON THINGS, SUCH AS READING THE NEWSPAPER OR WATCHING TELEVISION: NEARLY EVERY DAY
5. POOR APPETITE OR OVEREATING: 3
8. MOVING OR SPEAKING SO SLOWLY THAT OTHER PEOPLE COULD HAVE NOTICED. OR THE OPPOSITE, BEING SO FIGETY OR RESTLESS THAT YOU HAVE BEEN MOVING AROUND A LOT MORE THAN USUAL: 2
5. POOR APPETITE OR OVEREATING: NEARLY EVERY DAY
2. FEELING DOWN, DEPRESSED OR HOPELESS: 2
SUM OF ALL RESPONSES TO PHQ QUESTIONS 1-9: 22
10. IF YOU CHECKED OFF ANY PROBLEMS, HOW DIFFICULT HAVE THESE PROBLEMS MADE IT FOR YOU TO DO YOUR WORK, TAKE CARE OF THINGS AT HOME, OR GET ALONG WITH OTHER PEOPLE: VERY DIFFICULT
2. FEELING DOWN, DEPRESSED OR HOPELESS: MORE THAN HALF THE DAYS
8. MOVING OR SPEAKING SO SLOWLY THAT OTHER PEOPLE COULD HAVE NOTICED. OR THE OPPOSITE - BEING SO FIDGETY OR RESTLESS THAT YOU HAVE BEEN MOVING AROUND A LOT MORE THAN USUAL: MORE THAN HALF THE DAYS
3. TROUBLE FALLING OR STAYING ASLEEP: NEARLY EVERY DAY
1. LITTLE INTEREST OR PLEASURE IN DOING THINGS: 3
6. FEELING BAD ABOUT YOURSELF - OR THAT YOU ARE A FAILURE OR HAVE LET YOURSELF OR YOUR FAMILY DOWN: MORE THAN HALF THE DAYS
1. LITTLE INTEREST OR PLEASURE IN DOING THINGS: NEARLY EVERY DAY
6. FEELING BAD ABOUT YOURSELF - OR THAT YOU ARE A FAILURE OR HAVE LET YOURSELF OR YOUR FAMILY DOWN: 2
7. TROUBLE CONCENTRATING ON THINGS, SUCH AS READING THE NEWSPAPER OR WATCHING TELEVISION: 3
SUM OF ALL RESPONSES TO PHQ QUESTIONS 1-9: 22
9. THOUGHTS THAT YOU WOULD BE BETTER OFF DEAD, OR OF HURTING YOURSELF: 1
9. THOUGHTS THAT YOU WOULD BE BETTER OFF DEAD, OR OF HURTING YOURSELF: SEVERAL DAYS
10. IF YOU CHECKED OFF ANY PROBLEMS, HOW DIFFICULT HAVE THESE PROBLEMS MADE IT FOR YOU TO DO YOUR WORK, TAKE CARE OF THINGS AT HOME, OR GET ALONG WITH OTHER PEOPLE: 2
SUM OF ALL RESPONSES TO PHQ QUESTIONS 1-9: 22
SUM OF ALL RESPONSES TO PHQ QUESTIONS 1-9: 22
3. TROUBLE FALLING OR STAYING ASLEEP: 3

## 2023-06-01 ASSESSMENT — COLUMBIA-SUICIDE SEVERITY RATING SCALE - C-SSRS
2. IN THE PAST MONTH, HAVE YOU ACTUALLY HAD ANY THOUGHTS OF KILLING YOURSELF?: NO
1. IN THE PAST MONTH, HAVE YOU WISHED YOU WERE DEAD OR WISHED YOU COULD GO TO SLEEP AND NOT WAKE UP?: NO
6. IN YOUR LIFETIME, HAVE YOU EVER DONE ANYTHING, STARTED TO DO ANYTHING, OR PREPARED TO DO ANYTHING TO END YOUR LIFE?: NO

## 2023-06-06 ENCOUNTER — OFFICE VISIT (OUTPATIENT)
Dept: FAMILY MEDICINE CLINIC | Age: 63
End: 2023-06-06
Payer: MEDICARE

## 2023-06-06 VITALS
HEIGHT: 69 IN | DIASTOLIC BLOOD PRESSURE: 88 MMHG | HEART RATE: 84 BPM | OXYGEN SATURATION: 96 % | BODY MASS INDEX: 30.96 KG/M2 | SYSTOLIC BLOOD PRESSURE: 150 MMHG | WEIGHT: 209 LBS

## 2023-06-06 DIAGNOSIS — I10 ESSENTIAL HYPERTENSION: ICD-10-CM

## 2023-06-06 DIAGNOSIS — E55.9 VITAMIN D DEFICIENCY: ICD-10-CM

## 2023-06-06 DIAGNOSIS — R53.83 FATIGUE, UNSPECIFIED TYPE: ICD-10-CM

## 2023-06-06 DIAGNOSIS — R07.9 CHEST PAIN, UNSPECIFIED TYPE: ICD-10-CM

## 2023-06-06 DIAGNOSIS — Z00.00 MEDICARE ANNUAL WELLNESS VISIT, SUBSEQUENT: Primary | ICD-10-CM

## 2023-06-06 DIAGNOSIS — E03.9 HYPOTHYROIDISM, UNSPECIFIED TYPE: ICD-10-CM

## 2023-06-06 DIAGNOSIS — F32.A DEPRESSION, UNSPECIFIED DEPRESSION TYPE: ICD-10-CM

## 2023-06-06 DIAGNOSIS — E03.9 ACQUIRED HYPOTHYROIDISM: ICD-10-CM

## 2023-06-06 DIAGNOSIS — I10 PRIMARY HYPERTENSION: ICD-10-CM

## 2023-06-06 DIAGNOSIS — E78.00 HIGH CHOLESTEROL: ICD-10-CM

## 2023-06-06 DIAGNOSIS — R73.03 PREDIABETES: ICD-10-CM

## 2023-06-06 DIAGNOSIS — F90.9 ATTENTION DEFICIT HYPERACTIVITY DISORDER (ADHD), UNSPECIFIED ADHD TYPE: ICD-10-CM

## 2023-06-06 DIAGNOSIS — K94.00 COLOSTOMY COMPLICATION (HCC): ICD-10-CM

## 2023-06-06 PROCEDURE — 1036F TOBACCO NON-USER: CPT | Performed by: NURSE PRACTITIONER

## 2023-06-06 PROCEDURE — 3077F SYST BP >= 140 MM HG: CPT | Performed by: NURSE PRACTITIONER

## 2023-06-06 PROCEDURE — 3017F COLORECTAL CA SCREEN DOC REV: CPT | Performed by: NURSE PRACTITIONER

## 2023-06-06 PROCEDURE — G8417 CALC BMI ABV UP PARAM F/U: HCPCS | Performed by: NURSE PRACTITIONER

## 2023-06-06 PROCEDURE — G8427 DOCREV CUR MEDS BY ELIG CLIN: HCPCS | Performed by: NURSE PRACTITIONER

## 2023-06-06 PROCEDURE — 93000 ELECTROCARDIOGRAM COMPLETE: CPT | Performed by: NURSE PRACTITIONER

## 2023-06-06 PROCEDURE — 3079F DIAST BP 80-89 MM HG: CPT | Performed by: NURSE PRACTITIONER

## 2023-06-06 PROCEDURE — 99214 OFFICE O/P EST MOD 30 MIN: CPT | Performed by: NURSE PRACTITIONER

## 2023-06-06 PROCEDURE — G0439 PPPS, SUBSEQ VISIT: HCPCS | Performed by: NURSE PRACTITIONER

## 2023-06-06 RX ORDER — VERAPAMIL HYDROCHLORIDE 240 MG/1
240 TABLET, FILM COATED, EXTENDED RELEASE ORAL NIGHTLY
Qty: 90 TABLET | Refills: 3 | Status: SHIPPED | OUTPATIENT
Start: 2023-06-06

## 2023-06-06 RX ORDER — LEVOTHYROXINE SODIUM 0.03 MG/1
TABLET ORAL
Qty: 30 TABLET | Refills: 0 | OUTPATIENT
Start: 2023-06-06

## 2023-06-06 RX ORDER — DOXEPIN HYDROCHLORIDE 50 MG/1
CAPSULE ORAL
COMMUNITY
Start: 2023-05-17

## 2023-06-06 RX ORDER — LOSARTAN POTASSIUM 100 MG/1
TABLET ORAL
Qty: 90 TABLET | Refills: 3 | Status: SHIPPED | OUTPATIENT
Start: 2023-06-06

## 2023-06-06 ASSESSMENT — PATIENT HEALTH QUESTIONNAIRE - PHQ9
SUM OF ALL RESPONSES TO PHQ QUESTIONS 1-9: 9
SUM OF ALL RESPONSES TO PHQ QUESTIONS 1-9: 9
SUM OF ALL RESPONSES TO PHQ9 QUESTIONS 1 & 2: 3
4. FEELING TIRED OR HAVING LITTLE ENERGY: 3
SUM OF ALL RESPONSES TO PHQ QUESTIONS 1-9: 9
2. FEELING DOWN, DEPRESSED OR HOPELESS: 1
9. THOUGHTS THAT YOU WOULD BE BETTER OFF DEAD, OR OF HURTING YOURSELF: 0
8. MOVING OR SPEAKING SO SLOWLY THAT OTHER PEOPLE COULD HAVE NOTICED. OR THE OPPOSITE, BEING SO FIGETY OR RESTLESS THAT YOU HAVE BEEN MOVING AROUND A LOT MORE THAN USUAL: 1
3. TROUBLE FALLING OR STAYING ASLEEP: 0
5. POOR APPETITE OR OVEREATING: 0
1. LITTLE INTEREST OR PLEASURE IN DOING THINGS: 2
6. FEELING BAD ABOUT YOURSELF - OR THAT YOU ARE A FAILURE OR HAVE LET YOURSELF OR YOUR FAMILY DOWN: 1
10. IF YOU CHECKED OFF ANY PROBLEMS, HOW DIFFICULT HAVE THESE PROBLEMS MADE IT FOR YOU TO DO YOUR WORK, TAKE CARE OF THINGS AT HOME, OR GET ALONG WITH OTHER PEOPLE: 1
SUM OF ALL RESPONSES TO PHQ QUESTIONS 1-9: 9
7. TROUBLE CONCENTRATING ON THINGS, SUCH AS READING THE NEWSPAPER OR WATCHING TELEVISION: 1

## 2023-06-06 ASSESSMENT — LIFESTYLE VARIABLES
HOW OFTEN DO YOU HAVE A DRINK CONTAINING ALCOHOL: MONTHLY OR LESS
HOW MANY STANDARD DRINKS CONTAINING ALCOHOL DO YOU HAVE ON A TYPICAL DAY: 1 OR 2

## 2023-06-06 NOTE — PROGRESS NOTES
TAKE 1 TABLET BY MOUTH EVERY 6 HOURS AS NEEDED FOR PAIN WITH FOOD Yes NATALEE Pickard CNP   sildenafil (REVATIO) 20 MG tablet TAKE 1-5 TABLET BY MOUTH AS DIRECTED 30 MINUTES PRIOR TO INTERCOURSE. TAKE 6HRS APART FROM FLOMAX. Yes Historical Provider, MD   ALPRAZolam (XANAX) 1 MG tablet TAKE ONE TABLET (1 MG) BY MOUTH TWICE DAILY AND THREE TABLET (3 MG) BY MOUTH AT BEDTIME Yes Historical Provider, MD   tamsulosin (FLOMAX) 0.4 MG capsule Take 1 capsule by mouth daily Yes Historical Provider, MD   amphetamine-dextroamphetamine (ADDERALL) 30 MG tablet Take 2 tablets by mouth 2 times daily.  Two 30 mg tabs bid Yes Historical Provider, MD [place] (Including outside providers/suppliers regularly involved in providing care):   Patient Care Team:  NATALEE Pickard CNP as PCP - General (Nurse Practitioner Family)  NATALEE Pickard CNP as PCP - Empaneled Provider  Princess Maria C MD (Orthopedic Surgery)  Krysta Griffin MD (Orthopedic Surgery)  Tabatha Serrano [place] as Physician Assistant (Orthopedic Surgery)     Reviewed and updated this visit:  Tobacco  Allergies  Meds  Problems  Med Hx  Surg Hx  Soc Hx  Fam Hx

## 2023-06-06 NOTE — PATIENT INSTRUCTIONS
liability for your use of this information. Starting a Weight Loss Plan: Care Instructions  Overview     If you're thinking about losing weight, it can be hard to know where to start. Your doctor can help you set up a weight loss plan that best meets your needs. You may want to take a class on nutrition or exercise, or you could join a weight loss support group. If you have questions about how to make changes to your eating or exercise habits, ask your doctor about seeing a registered dietitian or an exercise specialist.  It can be a big challenge to lose weight. But you don't have to make huge changes at once. Make small changes, and stick with them. When those changes become habit, add a few more changes. If you don't think you're ready to make changes right now, try to pick a date in the future. Make an appointment to see your doctor to discuss whether the time is right for you to start a plan. Follow-up care is a key part of your treatment and safety. Be sure to make and go to all appointments, and call your doctor if you are having problems. It's also a good idea to know your test results and keep a list of the medicines you take. How can you care for yourself at home? Set realistic goals. Many people expect to lose much more weight than is likely. A weight loss of 5% to 10% of your body weight may be enough to improve your health. Get family and friends involved to provide support. Talk to them about why you are trying to lose weight, and ask them to help. They can help by participating in exercise and having meals with you, even if they may be eating something different. Find what works best for you. If you do not have time or do not like to cook, a program that offers meal replacement bars or shakes may be better for you.  Or if you like to prepare meals, finding a plan that includes daily menus and recipes may be best.  Ask your doctor about other health professionals who can help you achieve

## 2023-06-07 LAB
25(OH)D3 SERPL-MCNC: 33.1 NG/ML
ALBUMIN SERPL-MCNC: 4.5 G/DL (ref 3.4–5)
ALBUMIN/GLOB SERPL: 1.8 {RATIO} (ref 1.1–2.2)
ALP SERPL-CCNC: 109 U/L (ref 40–129)
ALT SERPL-CCNC: 11 U/L (ref 10–40)
ANION GAP SERPL CALCULATED.3IONS-SCNC: 14 MMOL/L (ref 3–16)
AST SERPL-CCNC: 13 U/L (ref 15–37)
BASOPHILS # BLD: 0 K/UL (ref 0–0.2)
BASOPHILS NFR BLD: 0.4 %
BILIRUB SERPL-MCNC: <0.2 MG/DL (ref 0–1)
BUN SERPL-MCNC: 17 MG/DL (ref 7–20)
CALCIUM SERPL-MCNC: 9.4 MG/DL (ref 8.3–10.6)
CHLORIDE SERPL-SCNC: 104 MMOL/L (ref 99–110)
CHOLEST SERPL-MCNC: 172 MG/DL (ref 0–199)
CO2 SERPL-SCNC: 23 MMOL/L (ref 21–32)
CREAT SERPL-MCNC: 0.9 MG/DL (ref 0.8–1.3)
DEPRECATED RDW RBC AUTO: 13.5 % (ref 12.4–15.4)
EOSINOPHIL # BLD: 0.1 K/UL (ref 0–0.6)
EOSINOPHIL NFR BLD: 2 %
EST. AVERAGE GLUCOSE BLD GHB EST-MCNC: 114 MG/DL
GFR SERPLBLD CREATININE-BSD FMLA CKD-EPI: >60 ML/MIN/{1.73_M2}
GLUCOSE SERPL-MCNC: 102 MG/DL (ref 70–99)
HBA1C MFR BLD: 5.6 %
HCT VFR BLD AUTO: 48.6 % (ref 40.5–52.5)
HDLC SERPL-MCNC: 43 MG/DL (ref 40–60)
HGB BLD-MCNC: 16.3 G/DL (ref 13.5–17.5)
LDLC SERPL CALC-MCNC: 101 MG/DL
LYMPHOCYTES # BLD: 1.5 K/UL (ref 1–5.1)
LYMPHOCYTES NFR BLD: 23.7 %
MCH RBC QN AUTO: 31 PG (ref 26–34)
MCHC RBC AUTO-ENTMCNC: 33.5 G/DL (ref 31–36)
MCV RBC AUTO: 92.6 FL (ref 80–100)
MONOCYTES # BLD: 0.7 K/UL (ref 0–1.3)
MONOCYTES NFR BLD: 11.7 %
NEUTROPHILS # BLD: 4 K/UL (ref 1.7–7.7)
NEUTROPHILS NFR BLD: 62.2 %
PLATELET # BLD AUTO: 216 K/UL (ref 135–450)
PMV BLD AUTO: 8.8 FL (ref 5–10.5)
POTASSIUM SERPL-SCNC: 4.3 MMOL/L (ref 3.5–5.1)
PROT SERPL-MCNC: 7 G/DL (ref 6.4–8.2)
RBC # BLD AUTO: 5.25 M/UL (ref 4.2–5.9)
SODIUM SERPL-SCNC: 141 MMOL/L (ref 136–145)
T4 FREE SERPL-MCNC: 1.4 NG/DL (ref 0.9–1.8)
TRIGL SERPL-MCNC: 139 MG/DL (ref 0–150)
TSH SERPL DL<=0.005 MIU/L-ACNC: 3.04 UIU/ML (ref 0.27–4.2)
VLDLC SERPL CALC-MCNC: 28 MG/DL
WBC # BLD AUTO: 6.4 K/UL (ref 4–11)

## 2023-07-06 DIAGNOSIS — E03.9 ACQUIRED HYPOTHYROIDISM: ICD-10-CM

## 2023-07-06 RX ORDER — LEVOTHYROXINE SODIUM 0.03 MG/1
TABLET ORAL
Qty: 30 TABLET | Refills: 5 | Status: SHIPPED | OUTPATIENT
Start: 2023-07-06

## 2023-12-02 DIAGNOSIS — R11.0 NAUSEA: ICD-10-CM

## 2023-12-02 DIAGNOSIS — E03.9 ACQUIRED HYPOTHYROIDISM: ICD-10-CM

## 2023-12-04 RX ORDER — ONDANSETRON 4 MG/1
TABLET, ORALLY DISINTEGRATING ORAL
Qty: 30 TABLET | Refills: 11 | Status: SHIPPED | OUTPATIENT
Start: 2023-12-04

## 2023-12-04 RX ORDER — LEVOTHYROXINE SODIUM 0.03 MG/1
TABLET ORAL
Qty: 30 TABLET | Refills: 5 | Status: SHIPPED | OUTPATIENT
Start: 2023-12-04

## 2024-04-01 ENCOUNTER — APPOINTMENT (OUTPATIENT)
Dept: CT IMAGING | Age: 64
DRG: 177 | End: 2024-04-01
Payer: MEDICARE

## 2024-04-01 ENCOUNTER — HOSPITAL ENCOUNTER (INPATIENT)
Age: 64
LOS: 1 days | Discharge: HOME | DRG: 177 | End: 2024-04-02
Attending: STUDENT IN AN ORGANIZED HEALTH CARE EDUCATION/TRAINING PROGRAM | Admitting: INTERNAL MEDICINE
Payer: MEDICARE

## 2024-04-01 ENCOUNTER — APPOINTMENT (OUTPATIENT)
Dept: GENERAL RADIOLOGY | Age: 64
DRG: 177 | End: 2024-04-01
Payer: MEDICARE

## 2024-04-01 DIAGNOSIS — R07.9 CHEST PAIN, UNSPECIFIED TYPE: ICD-10-CM

## 2024-04-01 DIAGNOSIS — U07.1 COVID-19: ICD-10-CM

## 2024-04-01 DIAGNOSIS — J96.01 ACUTE RESPIRATORY FAILURE WITH HYPOXIA (HCC): Primary | ICD-10-CM

## 2024-04-01 LAB
ALBUMIN SERPL-MCNC: 4.1 G/DL (ref 3.4–5)
ALBUMIN/GLOB SERPL: 1.5 {RATIO} (ref 1.1–2.2)
ALP SERPL-CCNC: 92 U/L (ref 40–129)
ALT SERPL-CCNC: 7 U/L (ref 10–40)
ANION GAP SERPL CALCULATED.3IONS-SCNC: 13 MMOL/L (ref 3–16)
AST SERPL-CCNC: 15 U/L (ref 15–37)
BASE EXCESS BLDV CALC-SCNC: 0.3 MMOL/L (ref -3–3)
BASOPHILS # BLD: 0 K/UL (ref 0–0.2)
BASOPHILS NFR BLD: 0.5 %
BILIRUB SERPL-MCNC: 0.7 MG/DL (ref 0–1)
BUN SERPL-MCNC: 14 MG/DL (ref 7–20)
CALCIUM SERPL-MCNC: 8.6 MG/DL (ref 8.3–10.6)
CHLORIDE SERPL-SCNC: 101 MMOL/L (ref 99–110)
CO2 BLDV-SCNC: 25 MMOL/L
CO2 SERPL-SCNC: 27 MMOL/L (ref 21–32)
COHGB MFR BLDV: 3.7 % (ref 0–1.5)
CREAT SERPL-MCNC: 0.7 MG/DL (ref 0.8–1.3)
DEPRECATED RDW RBC AUTO: 13.6 % (ref 12.4–15.4)
EOSINOPHIL # BLD: 0 K/UL (ref 0–0.6)
EOSINOPHIL NFR BLD: 0.5 %
FLUAV RNA RESP QL NAA+PROBE: NOT DETECTED
FLUBV RNA RESP QL NAA+PROBE: NOT DETECTED
GFR SERPLBLD CREATININE-BSD FMLA CKD-EPI: >90 ML/MIN/{1.73_M2}
GLUCOSE SERPL-MCNC: 106 MG/DL (ref 70–99)
HCO3 BLDV-SCNC: 24.2 MMOL/L (ref 23–29)
HCT VFR BLD AUTO: 43.5 % (ref 40.5–52.5)
HGB BLD-MCNC: 14.8 G/DL (ref 13.5–17.5)
LYMPHOCYTES # BLD: 0.6 K/UL (ref 1–5.1)
LYMPHOCYTES NFR BLD: 8.3 %
MAGNESIUM SERPL-MCNC: 1.6 MG/DL (ref 1.8–2.4)
MCH RBC QN AUTO: 30.7 PG (ref 26–34)
MCHC RBC AUTO-ENTMCNC: 34.1 G/DL (ref 31–36)
MCV RBC AUTO: 90.1 FL (ref 80–100)
METHGB MFR BLDV: 0.3 %
MONOCYTES # BLD: 0.3 K/UL (ref 0–1.3)
MONOCYTES NFR BLD: 3.6 %
NEUTROPHILS # BLD: 6.2 K/UL (ref 1.7–7.7)
NEUTROPHILS NFR BLD: 87.1 %
NT-PROBNP SERPL-MCNC: 138 PG/ML (ref 0–124)
O2 CT VFR BLDV CALC: 9 VOL %
O2 THERAPY: ABNORMAL
PCO2 BLDV: 37.1 MMHG (ref 40–50)
PH BLDV: 7.43 [PH] (ref 7.35–7.45)
PLATELET # BLD AUTO: 161 K/UL (ref 135–450)
PMV BLD AUTO: 7.6 FL (ref 5–10.5)
PO2 BLDV: 22.7 MMHG (ref 25–40)
POTASSIUM SERPL-SCNC: 3.2 MMOL/L (ref 3.5–5.1)
PROT SERPL-MCNC: 6.8 G/DL (ref 6.4–8.2)
RBC # BLD AUTO: 4.83 M/UL (ref 4.2–5.9)
SAO2 % BLDV: 42 %
SARS-COV-2 RNA RESP QL NAA+PROBE: DETECTED
SODIUM SERPL-SCNC: 141 MMOL/L (ref 136–145)
TROPONIN, HIGH SENSITIVITY: 7 NG/L (ref 0–22)
TROPONIN, HIGH SENSITIVITY: 7 NG/L (ref 0–22)
WBC # BLD AUTO: 7.1 K/UL (ref 4–11)

## 2024-04-01 PROCEDURE — 82803 BLOOD GASES ANY COMBINATION: CPT

## 2024-04-01 PROCEDURE — 96365 THER/PROPH/DIAG IV INF INIT: CPT

## 2024-04-01 PROCEDURE — 71260 CT THORAX DX C+: CPT

## 2024-04-01 PROCEDURE — 80053 COMPREHEN METABOLIC PANEL: CPT

## 2024-04-01 PROCEDURE — 83735 ASSAY OF MAGNESIUM: CPT

## 2024-04-01 PROCEDURE — 6360000002 HC RX W HCPCS: Performed by: STUDENT IN AN ORGANIZED HEALTH CARE EDUCATION/TRAINING PROGRAM

## 2024-04-01 PROCEDURE — 6370000000 HC RX 637 (ALT 250 FOR IP): Performed by: STUDENT IN AN ORGANIZED HEALTH CARE EDUCATION/TRAINING PROGRAM

## 2024-04-01 PROCEDURE — 93005 ELECTROCARDIOGRAM TRACING: CPT | Performed by: STUDENT IN AN ORGANIZED HEALTH CARE EDUCATION/TRAINING PROGRAM

## 2024-04-01 PROCEDURE — 96366 THER/PROPH/DIAG IV INF ADDON: CPT

## 2024-04-01 PROCEDURE — 96375 TX/PRO/DX INJ NEW DRUG ADDON: CPT

## 2024-04-01 PROCEDURE — 99285 EMERGENCY DEPT VISIT HI MDM: CPT

## 2024-04-01 PROCEDURE — 6360000004 HC RX CONTRAST MEDICATION: Performed by: STUDENT IN AN ORGANIZED HEALTH CARE EDUCATION/TRAINING PROGRAM

## 2024-04-01 PROCEDURE — 36415 COLL VENOUS BLD VENIPUNCTURE: CPT

## 2024-04-01 PROCEDURE — 71045 X-RAY EXAM CHEST 1 VIEW: CPT

## 2024-04-01 PROCEDURE — 87636 SARSCOV2 & INF A&B AMP PRB: CPT

## 2024-04-01 PROCEDURE — 1200000000 HC SEMI PRIVATE

## 2024-04-01 PROCEDURE — 84484 ASSAY OF TROPONIN QUANT: CPT

## 2024-04-01 PROCEDURE — 83880 ASSAY OF NATRIURETIC PEPTIDE: CPT

## 2024-04-01 PROCEDURE — 85025 COMPLETE CBC W/AUTO DIFF WBC: CPT

## 2024-04-01 RX ORDER — ASPIRIN 325 MG
325 TABLET ORAL ONCE
Status: COMPLETED | OUTPATIENT
Start: 2024-04-01 | End: 2024-04-01

## 2024-04-01 RX ORDER — ONDANSETRON 2 MG/ML
4 INJECTION INTRAMUSCULAR; INTRAVENOUS EVERY 6 HOURS PRN
Status: DISCONTINUED | OUTPATIENT
Start: 2024-04-01 | End: 2024-04-02 | Stop reason: HOSPADM

## 2024-04-01 RX ORDER — DEXAMETHASONE 4 MG/1
6 TABLET ORAL ONCE
Status: COMPLETED | OUTPATIENT
Start: 2024-04-01 | End: 2024-04-01

## 2024-04-01 RX ORDER — IPRATROPIUM BROMIDE AND ALBUTEROL SULFATE 2.5; .5 MG/3ML; MG/3ML
1 SOLUTION RESPIRATORY (INHALATION)
Status: DISCONTINUED | OUTPATIENT
Start: 2024-04-01 | End: 2024-04-01

## 2024-04-01 RX ORDER — POTASSIUM CHLORIDE 20 MEQ/1
40 TABLET, EXTENDED RELEASE ORAL ONCE
Status: COMPLETED | OUTPATIENT
Start: 2024-04-01 | End: 2024-04-01

## 2024-04-01 RX ORDER — MAGNESIUM SULFATE IN WATER 40 MG/ML
2000 INJECTION, SOLUTION INTRAVENOUS ONCE
Status: COMPLETED | OUTPATIENT
Start: 2024-04-01 | End: 2024-04-01

## 2024-04-01 RX ADMIN — IOPAMIDOL 85 ML: 755 INJECTION, SOLUTION INTRAVENOUS at 17:26

## 2024-04-01 RX ADMIN — MAGNESIUM SULFATE HEPTAHYDRATE 2000 MG: 40 INJECTION, SOLUTION INTRAVENOUS at 18:12

## 2024-04-01 RX ADMIN — ASPIRIN 325 MG: 325 TABLET ORAL at 16:52

## 2024-04-01 RX ADMIN — POTASSIUM CHLORIDE 40 MEQ: 1500 TABLET, EXTENDED RELEASE ORAL at 18:11

## 2024-04-01 RX ADMIN — IPRATROPIUM BROMIDE AND ALBUTEROL SULFATE 1 DOSE: 2.5; .5 SOLUTION RESPIRATORY (INHALATION) at 16:53

## 2024-04-01 RX ADMIN — DEXAMETHASONE 6 MG: 4 TABLET ORAL at 19:41

## 2024-04-01 RX ADMIN — IPRATROPIUM BROMIDE AND ALBUTEROL SULFATE 1 DOSE: 2.5; .5 SOLUTION RESPIRATORY (INHALATION) at 19:44

## 2024-04-01 RX ADMIN — ONDANSETRON 4 MG: 2 INJECTION INTRAMUSCULAR; INTRAVENOUS at 16:52

## 2024-04-01 ASSESSMENT — PAIN - FUNCTIONAL ASSESSMENT: PAIN_FUNCTIONAL_ASSESSMENT: 0-10

## 2024-04-01 ASSESSMENT — PAIN DESCRIPTION - ORIENTATION: ORIENTATION: LEFT

## 2024-04-01 ASSESSMENT — PAIN DESCRIPTION - PAIN TYPE: TYPE: ACUTE PAIN

## 2024-04-01 ASSESSMENT — PAIN DESCRIPTION - LOCATION: LOCATION: CHEST

## 2024-04-01 ASSESSMENT — PAIN DESCRIPTION - FREQUENCY: FREQUENCY: INTERMITTENT

## 2024-04-01 ASSESSMENT — PAIN DESCRIPTION - DESCRIPTORS: DESCRIPTORS: SHARP;ACHING

## 2024-04-01 ASSESSMENT — PAIN SCALES - GENERAL: PAINLEVEL_OUTOF10: 5

## 2024-04-01 NOTE — ED PROVIDER NOTES
ondansetron (ZOFRAN) injection 4 mg (4 mg IntraVENous Given 4/1/24 1652)   ipratropium 0.5 mg-albuterol 2.5 mg (DUONEB) nebulizer solution 1 Dose (1 Dose Inhalation Given 4/1/24 1653)   magnesium sulfate 2000 mg in 50 mL IVPB premix (has no administration in time range)   potassium chloride (KLOR-CON M) extended release tablet 40 mEq (has no administration in time range)   aspirin tablet 325 mg (325 mg Oral Given 4/1/24 1652)   iopamidol (ISOVUE-370) 76 % injection 85 mL (85 mLs IntraVENous Given 4/1/24 1726)        REASSESSMENT:  On reassessment, patient did have oxygen desaturation in the emergency department.  Placed on 2 L.  Workup was markable for COVID.  Patient received Decadron.. At this time, do feel the patient requires admission for further work-up and management.  Patient agrees to admission. Discussed the patient with hospital team, Dr Christiansen, patient to be admitted to [facility].    Vitals:    Vitals:    04/01/24 2005 04/01/24 2130 04/01/24 2233 04/01/24 2306   BP: (!) 144/65 (!) 147/68 (!) 154/78 (!) 148/70   Pulse: 72 68 64 62   Resp: 17 30     Temp:       TempSrc:       SpO2: 98% 90% 94% 96%   Weight:       Height:           CRITICAL CARE TIME     I personally spent a total of 33 minutes of critical care time in obtaining history, performing a physical exam, bedside monitoring of interventions, collecting and interpreting tests and discussion with consultants but excluding time spent performing procedures, treating other patients and teaching time.                   FINAL IMPRESSION      1. Acute respiratory failure with hypoxia (HCC)    2. Chest pain, unspecified type    3. COVID-19          DISPOSITION/PLAN   Disposition: DISPOSITION Admitted 04/01/2024 11:50:14 PM    Condition: stable     DISCLAIMER: This chart was created using Dragon dictation software.  Efforts were made by me to ensure accuracy, however some errors may be present due to limitations of this technology and occasionally  words are not transcribed correctly.    MD Aden Barriga Erica J, MD  04/02/24 0003

## 2024-04-02 VITALS
BODY MASS INDEX: 27.94 KG/M2 | HEART RATE: 69 BPM | HEIGHT: 70 IN | WEIGHT: 195.2 LBS | RESPIRATION RATE: 16 BRPM | SYSTOLIC BLOOD PRESSURE: 156 MMHG | OXYGEN SATURATION: 97 % | DIASTOLIC BLOOD PRESSURE: 74 MMHG | TEMPERATURE: 98.2 F

## 2024-04-02 PROBLEM — R06.02 SHORTNESS OF BREATH: Status: ACTIVE | Noted: 2024-04-02

## 2024-04-02 PROBLEM — J45.21 MILD INTERMITTENT ASTHMA WITH ACUTE EXACERBATION: Status: ACTIVE | Noted: 2024-04-02

## 2024-04-02 LAB
25(OH)D3 SERPL-MCNC: 20.3 NG/ML
CRP SERPL-MCNC: 69.2 MG/L (ref 0–5.1)
EKG ATRIAL RATE: 60 BPM
EKG DIAGNOSIS: NORMAL
EKG P AXIS: 67 DEGREES
EKG P-R INTERVAL: 204 MS
EKG Q-T INTERVAL: 446 MS
EKG QRS DURATION: 90 MS
EKG QTC CALCULATION (BAZETT): 446 MS
EKG R AXIS: 21 DEGREES
EKG T AXIS: 51 DEGREES
EKG VENTRICULAR RATE: 60 BPM
FERRITIN SERPL IA-MCNC: 297 NG/ML (ref 30–400)

## 2024-04-02 PROCEDURE — 6360000002 HC RX W HCPCS: Performed by: INTERNAL MEDICINE

## 2024-04-02 PROCEDURE — 82728 ASSAY OF FERRITIN: CPT

## 2024-04-02 PROCEDURE — G0378 HOSPITAL OBSERVATION PER HR: HCPCS

## 2024-04-02 PROCEDURE — 36415 COLL VENOUS BLD VENIPUNCTURE: CPT

## 2024-04-02 PROCEDURE — 2580000003 HC RX 258: Performed by: INTERNAL MEDICINE

## 2024-04-02 PROCEDURE — 6370000000 HC RX 637 (ALT 250 FOR IP): Performed by: INTERNAL MEDICINE

## 2024-04-02 PROCEDURE — 82306 VITAMIN D 25 HYDROXY: CPT

## 2024-04-02 PROCEDURE — 86140 C-REACTIVE PROTEIN: CPT

## 2024-04-02 PROCEDURE — 99238 HOSP IP/OBS DSCHRG MGMT 30/<: CPT | Performed by: INTERNAL MEDICINE

## 2024-04-02 PROCEDURE — XW0DXM6 INTRODUCTION OF BARICITINIB INTO MOUTH AND PHARYNX, EXTERNAL APPROACH, NEW TECHNOLOGY GROUP 6: ICD-10-PCS | Performed by: INTERNAL MEDICINE

## 2024-04-02 PROCEDURE — 99222 1ST HOSP IP/OBS MODERATE 55: CPT | Performed by: INTERNAL MEDICINE

## 2024-04-02 PROCEDURE — 93010 ELECTROCARDIOGRAM REPORT: CPT | Performed by: INTERNAL MEDICINE

## 2024-04-02 RX ORDER — DOXEPIN HYDROCHLORIDE 25 MG/1
50 CAPSULE ORAL NIGHTLY
Status: DISCONTINUED | OUTPATIENT
Start: 2024-04-02 | End: 2024-04-02

## 2024-04-02 RX ORDER — ALBUTEROL SULFATE 90 UG/1
2 AEROSOL, METERED RESPIRATORY (INHALATION) 4 TIMES DAILY PRN
Qty: 18 G | Refills: 0 | Status: SHIPPED | OUTPATIENT
Start: 2024-04-02

## 2024-04-02 RX ORDER — ALPRAZOLAM 1 MG/1
2 TABLET ORAL NIGHTLY
Status: DISCONTINUED | OUTPATIENT
Start: 2024-04-02 | End: 2024-04-02 | Stop reason: HOSPADM

## 2024-04-02 RX ORDER — ALPRAZOLAM 1 MG/1
1 TABLET ORAL EVERY MORNING
Status: DISCONTINUED | OUTPATIENT
Start: 2024-04-02 | End: 2024-04-02 | Stop reason: HOSPADM

## 2024-04-02 RX ORDER — DEXTROAMPHETAMINE SACCHARATE, AMPHETAMINE ASPARTATE, DEXTROAMPHETAMINE SULFATE AND AMPHETAMINE SULFATE 2.5; 2.5; 2.5; 2.5 MG/1; MG/1; MG/1; MG/1
60 TABLET ORAL 2 TIMES DAILY
Status: DISCONTINUED | OUTPATIENT
Start: 2024-04-02 | End: 2024-04-02 | Stop reason: HOSPADM

## 2024-04-02 RX ORDER — LEVOTHYROXINE SODIUM 0.03 MG/1
25 TABLET ORAL DAILY
Status: DISCONTINUED | OUTPATIENT
Start: 2024-04-02 | End: 2024-04-02 | Stop reason: HOSPADM

## 2024-04-02 RX ORDER — POLYETHYLENE GLYCOL 3350 17 G/17G
17 POWDER, FOR SOLUTION ORAL DAILY PRN
Status: DISCONTINUED | OUTPATIENT
Start: 2024-04-02 | End: 2024-04-02 | Stop reason: HOSPADM

## 2024-04-02 RX ORDER — ALPRAZOLAM 1 MG/1
TABLET ORAL
COMMUNITY

## 2024-04-02 RX ORDER — LOSARTAN POTASSIUM 100 MG/1
100 TABLET ORAL DAILY
Status: DISCONTINUED | OUTPATIENT
Start: 2024-04-02 | End: 2024-04-02 | Stop reason: HOSPADM

## 2024-04-02 RX ORDER — ACETAMINOPHEN 325 MG/1
650 TABLET ORAL EVERY 6 HOURS PRN
Status: DISCONTINUED | OUTPATIENT
Start: 2024-04-02 | End: 2024-04-02 | Stop reason: HOSPADM

## 2024-04-02 RX ORDER — TAMSULOSIN HYDROCHLORIDE 0.4 MG/1
0.4 CAPSULE ORAL DAILY
Status: DISCONTINUED | OUTPATIENT
Start: 2024-04-02 | End: 2024-04-02 | Stop reason: HOSPADM

## 2024-04-02 RX ORDER — SODIUM CHLORIDE 9 MG/ML
INJECTION, SOLUTION INTRAVENOUS PRN
Status: DISCONTINUED | OUTPATIENT
Start: 2024-04-02 | End: 2024-04-02 | Stop reason: HOSPADM

## 2024-04-02 RX ORDER — SODIUM CHLORIDE 0.9 % (FLUSH) 0.9 %
5-40 SYRINGE (ML) INJECTION EVERY 12 HOURS SCHEDULED
Status: DISCONTINUED | OUTPATIENT
Start: 2024-04-02 | End: 2024-04-02 | Stop reason: HOSPADM

## 2024-04-02 RX ORDER — SODIUM CHLORIDE 0.9 % (FLUSH) 0.9 %
5-40 SYRINGE (ML) INJECTION PRN
Status: DISCONTINUED | OUTPATIENT
Start: 2024-04-02 | End: 2024-04-02 | Stop reason: HOSPADM

## 2024-04-02 RX ORDER — ONDANSETRON 2 MG/ML
4 INJECTION INTRAMUSCULAR; INTRAVENOUS EVERY 6 HOURS PRN
Status: DISCONTINUED | OUTPATIENT
Start: 2024-04-02 | End: 2024-04-02 | Stop reason: HOSPADM

## 2024-04-02 RX ORDER — VERAPAMIL HYDROCHLORIDE 240 MG/1
240 TABLET, FILM COATED, EXTENDED RELEASE ORAL NIGHTLY
Status: DISCONTINUED | OUTPATIENT
Start: 2024-04-02 | End: 2024-04-02 | Stop reason: HOSPADM

## 2024-04-02 RX ORDER — ONDANSETRON 4 MG/1
4 TABLET, ORALLY DISINTEGRATING ORAL EVERY 8 HOURS PRN
Status: DISCONTINUED | OUTPATIENT
Start: 2024-04-02 | End: 2024-04-02 | Stop reason: HOSPADM

## 2024-04-02 RX ORDER — ALPRAZOLAM 1 MG/1
1 TABLET ORAL 2 TIMES DAILY
Status: DISCONTINUED | OUTPATIENT
Start: 2024-04-02 | End: 2024-04-02

## 2024-04-02 RX ORDER — ENOXAPARIN SODIUM 100 MG/ML
40 INJECTION SUBCUTANEOUS DAILY
Status: DISCONTINUED | OUTPATIENT
Start: 2024-04-02 | End: 2024-04-02 | Stop reason: HOSPADM

## 2024-04-02 RX ORDER — DEXAMETHASONE 6 MG/1
6 TABLET ORAL
Qty: 3 TABLET | Refills: 0 | Status: SHIPPED | OUTPATIENT
Start: 2024-04-03 | End: 2024-04-06

## 2024-04-02 RX ORDER — ACETAMINOPHEN 650 MG/1
650 SUPPOSITORY RECTAL EVERY 6 HOURS PRN
Status: DISCONTINUED | OUTPATIENT
Start: 2024-04-02 | End: 2024-04-02 | Stop reason: HOSPADM

## 2024-04-02 RX ADMIN — LOSARTAN POTASSIUM 100 MG: 100 TABLET, FILM COATED ORAL at 08:36

## 2024-04-02 RX ADMIN — ALPRAZOLAM 1 MG: 1 TABLET ORAL at 08:37

## 2024-04-02 RX ADMIN — DEXAMETHASONE 6 MG: 4 TABLET ORAL at 08:36

## 2024-04-02 RX ADMIN — LEVOTHYROXINE SODIUM 25 MCG: 25 TABLET ORAL at 06:36

## 2024-04-02 RX ADMIN — Medication 10 ML: at 08:36

## 2024-04-02 RX ADMIN — TAMSULOSIN HYDROCHLORIDE 0.4 MG: 0.4 CAPSULE ORAL at 08:37

## 2024-04-02 NOTE — CONSULTS
BARICITINIB INITIATION    Parameters (must answer yes)  COVID + yes   Oxygen Requirement NO   Elevated Inflammatory Marker Lab in process   Steroid Use yes     Parameters (must answer no)  Tocilizumab in last 14 days no   Pregnant no   Hepatic Impairment (severe) no   Chronic/Recurrent Infections no   Hemoglobin <8.0 g/dL no   Chronically immunosuppressed (via drug or disease) no   ANC <500 cells/mcL no   ALC <200 cells/mcL no       Patient's current SPO2 @ 98% on room air and therefore does not meet the oxygen requirement to begin baricitinib. If patient's condition worsens, another consult may be ordered.     Azra Reynolds, GenovevaD, [facility], 4/2/2024 6:44 AM

## 2024-04-02 NOTE — PLAN OF CARE
Problem: Discharge Planning  Goal: Discharge to home or other facility with appropriate resources  4/2/2024 0925 by Umm Eaton, RN  Outcome: Progressing  4/2/2024 0242 by Petty Goodson, RN  Outcome: Progressing     Problem: Chronic Conditions and Co-morbidities  Goal: Patient's chronic conditions and co-morbidity symptoms are monitored and maintained or improved  4/2/2024 0242 by Petty Goodson, RN  Outcome: Progressing

## 2024-04-02 NOTE — DISCHARGE INSTR - COC
Continuity of Care Form    Patient Name: Gabe Guzman Jr   :  1960  MRN:  [id number]    Admit date:  2024  Discharge date:  24    Code Status Order: Full Code   Advance Directives:     Admitting Physician:  Chris Christiansen MD  PCP: Kristie Barrow APRN - CNP    Discharging Nurse: BRIAN Salomon  Discharging Hospital Unit/Room#: 0219/0219-01  Discharging Unit Phone Number: [phone]    Emergency Contact:   Extended Emergency Contact Information  Primary Emergency Contact: Candelaria luu  Home Phone: [phone]  Mobile Phone: [phone]  Relation: Other  Secondary Emergency Contact: Gabe Gonzalez  Mobile Phone: [phone]  Relation: Child  Preferred language: English    Past Surgical History:  Past Surgical History:   Procedure Laterality Date    ACHILLES TENDON SURGERY      right    APPENDECTOMY      BLADDER SURGERY      fistula x2    CARPAL TUNNEL RELEASE  12    Release of Right Transverse Carpal Tunnel Ligament    COLON SURGERY      COLONOSCOPY N/A 2020    COLON (9:00) performed by Phil Varela MD at [facility] ENDOSCOPY    COLOSTOMY      CYST REMOVAL      CYSTOSCOPY  3/13/14    HERNIA REPAIR  14    lysis of adhesions    JOINT REPLACEMENT Bilateral     KNEE ARTHROPLASTY  [id number]    right total    KNEE ARTHROSCOPY      left    OTHER SURGICAL HISTORY  12    Biopsy of Lesion in Colostomy    REMOVAL OF FECAL IMPACTION N/A 2021    EXAM UNDER ANESTHESIA, FLEXIBLE SIGMOIDOSCOPY, DISIMPACTION OF RECTUM UNDER ANESTHESIA performed by Edy Espinoza MD at [facility] OR    REVISION COLOSTOMY Left 3/13/14    Removal of polyps    ROTATOR CUFF REPAIR      right    SIGMOIDOSCOPY N/A 3/12/2020    SIGMOIDOSCOPY DIAGNOSTIC FLEXIBLE (10:20) performed by Phil Varela MD at [facility] ENDOSCOPY    TONSILLECTOMY      TOTAL KNEE ARTHROPLASTY      TOTAL KNEE ARTHROPLASTY Left 2016       Immunization History:   Immunization History   Administered Date(s) Administered

## 2024-04-02 NOTE — DISCHARGE SUMMARY
Hospital Medicine Discharge Summary    Patient: Gabe Guzman Jr     Gender: male  : 1960   Age: 63 y.o.  MRN: [id number]    Admitting Physician: Chris Christiansen MD  Discharge Physician: Sandy Victor DO    Code Status: Full Code     Admit Date: 2024   Discharge Date: 2024      Discharge Diagnoses:    Active Hospital Problems    Diagnosis Date Noted    Chest pain [R07.9] 2024    COVID [U07.1] 2024    Acute hypoxemic respiratory failure (HCC) [J96.01] 2024     Condition at Discharge: Stable    Hospital Course:   Gabe Guzman Jr is a 63 y.o. 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     Patient states that for 2 weeks he has noticed fatigue, decreased taste, myalgia.  In the last 24 hours has had notable shortness of breath, chills and sweats.  His son noticed that he was not feeling well and urged him to come to the emergency room.  Patient tested positive for COVID.  Patient currently is not wearing any oxygen but there are 2 documented oxygen saturations low enough that admission for treatment of COVID was recommended.  Magnesium and potassium were low and these were supplemented.  Patient did not self test himself in the prior 2 weeks. CTPA negative for acute findings.     Mr Guzman is doing much better today. He has been on room air with normal O2 saturations since coming up to the floor from the ER. He does have childhood history of asthma but has not needed any treatment for this for many years. He has had some wheezing and cough prior to coming in. He is now feeling much better, suspect mild asthma exacerbation in the setting of Covid infection. He does not like to take steroids but is agreeable to a brief course, as well as inhaler and  He does have abnormal EKG. Discussed with cardiology. Plan is for close follow-up with cardiology        Radiology:  CT CHEST PULMONARY EMBOLISM W CONTRAST    Result Date: 4/1/2024  EXAMINATION: CTA OF THE CHEST 4/1/2024 5:26 pm TECHNIQUE: CTA of the chest was performed after the administration of intravenous contrast.  Multiplanar reformatted images are provided for review.  MIP images are provided for review. Automated exposure control, iterative reconstruction, and/or weight based adjustment of the mA/kV was utilized to reduce the radiation dose to as low as reasonably achievable. COMPARISON: CT abdomen pelvis done 02/03/2020. HISTORY: ORDERING SYSTEM PROVIDED HISTORY: History of PE, not on anticoagulation, chest pain, shortness of breath TECHNOLOGIST PROVIDED HISTORY: Reason for exam:->History of PE, not on anticoagulation, chest pain, shortness of breath Additional Contrast?->1 Reason for Exam: History of PE, not on anticoagulation, chest pain, shortness of breath FINDINGS: Pulmonary Arteries: Pulmonary arteries are adequately opacified for evaluation.  No evidence of intraluminal filling defect to suggest pulmonary embolism.  Main pulmonary artery is normal in caliber. Mediastinum: No evidence of mediastinal lymphadenopathy.  The heart and pericardium demonstrate no acute abnormality.  There is no acute abnormality of the thoracic aorta. Lungs/pleura: The lungs are without acute process.  No focal consolidation or pulmonary edema.  No evidence of pleural effusion or pneumothorax.  Stable benign calcified granuloma in the anterior inferior right upper lobe.  Stable linear scarring in the lingula. Upper Abdomen: Ovoid 2.0 cm circumscribed hypodensity in the right hepatic lobe likely represents benign etiology such as a cyst or hemangioma. Otherwise, no obvious acute abnormality in the visualized upper abdomen. Soft Tissues/Bones: No acute bone or soft tissue abnormality.  Multilevel degenerative changes in the visualized spine.     No evidence of pulmonary embolism or acute pulmonary abnormality.     XR CHEST

## 2024-04-02 NOTE — CONSULTS
CARDIOLOGY CONSULTATION        Patient Name: Gaeb Guzman Jr  Date of admission: 4/1/2024  4:26 PM  Admission Dx: Chest pain [R07.9]  Acute respiratory failure with hypoxia (HCC) [J96.01]  Chest pain, unspecified type [R07.9]  COVID-19 [U07.1]  Requesting Physician: Chris Christiansen MD  Primary Care physician: Kristie Barrow APRN - CNP    Reason for Consultation/Chief Complaint: Chest pain    History of Present Illness:     Gabe Guzman Jr is a 63 y.o. patient with a prior medical history notable for pulmonary embolism, hypertension, hypothyroidism and perforated viscus history complicated by multiple fistulas status post colostomy, who presented to the hospital with complaints of shortness of breath.    ED course/Vital signs on presentation: Blood pressure in heart rate stable.  2 L oxygen requirement with saturation 94 to 98%.  CT chest showed no evidence of pulmonary emboli.  Normal caliber PA.  No cardiac abnormalities.  No focal consolidation or pulmonary edema.  Normal study.  EKG showed normal sinus with possible septal infarct pattern, no acute changes.  High sensitive troponins 7, 7.  Magnesium 1.6, potassium 3.2.  proBNP 138.  COVID-19 positive.    Patient notes increased fatigue, low appetite, myalgias for the prior 2 weeks. He was working around exterior son's home with his son yesterday clearing different things. He noted some black mold while doing this. Few hrs later got his shingles shot. Later that afternoon, felt he developed fairly acute onset shortness of breath.  Felt chills and diaphoresis. Presented to ED at urging of his son. He notes a little pressure anterior chest with working hard to breathe. Notes this dissipated short while after breathing treatments/steroids. He has no chest pain today. He notes no history of preceding angina with activities this past several weeks. Denies palpitations. Mild bouts of LH/dizziness. Attributes to lack of appetite. Denies syncope. Denies      Lab Results   Component Value Date    LDLCALC 101 (H) 06/06/2023    LDLCALC 95 10/26/2020    LDLCALC 120 (H) 08/29/2018     No results found for: \"VLDL\"  No results found for: \"CHOLHDLRATIO\"     Cardiac Data:     EKG: personally reviewed with my interpretation as above.     No cardiovascular testing for review    Chest x-ray personally reviewed       Impression and Plan:      Dyspnea with exertion  COVID-19 positivity   -Rx per primary team .  He is receiving Decadron presently.  No oxygen requirement.  No contraindications from a cardiac standpoint to Paxlovid if indicated    Chest pain, atypical, resolved  Abnormal EKG -possible anteroseptal infarct, I suspect this may be lead malposition however  Intermediate risk ASCVD risk   -Chest discomfort was nonexertional, with difficulty breathing and deep inspiration, now resolved.  I suspect this is related to COVID-19 pneumonia.  No further inpatient workup is planned at this time    Hypokalemia/hypomagnesemia   -Replete    Hypertension-mildly elevated above goal   -Plan to reevaluate outpatient.  Continue losartan and verapamil as ordered.    Hypothyroidism      The 10-year ASCVD risk score (Farhan DK, et al., 2019) is: 17.3%    Values used to calculate the score:      Age: 63 years      Sex: Male      Is Non- : No      Diabetic: No      Tobacco smoker: No      Systolic Blood Pressure: 156 mmHg      Is BP treated: Yes      HDL Cholesterol: 43 mg/dL      Total Cholesterol: 172 mg/dL      Primary team to decide upon treatment for COVID-19 and disposition for this.  From a cardiac standpoint, patient has negative cardiac enzymes and is low risk for events.  Chest pain is atypical and resolved.  I will arrange outpatient follow-up in 4 to 6 weeks in the office with me with consideration of stress testing for further risk stratification if persistent symptoms.  Will reevaluate blood pressure at that time as well.    Cardiology service will sign

## 2024-04-02 NOTE — PROGRESS NOTES
4 Eyes Skin Assessment     NAME:  Gabe Guzman Jr  YOB: 1960  MEDICAL RECORD NUMBER:  [id number]    The patient is being assessed for  Admission    I agree that at least one RN has performed a thorough Head to Toe Skin Assessment on the patient. ALL assessment sites listed below have been assessed.      Areas assessed by both nurses:    Head, Face, Ears, Arms, Elbows, Hands, and Legs. Feet and Heels        Pt refused at this time. Colostomy on LUQ, states no wounds on body at this time.    Does the Patient have a Wound? No noted wound(s)       Ulysses Prevention initiated by RN: No  Wound Care Orders initiated by RN: No    Pressure Injury (Stage 3,4, Unstageable, DTI, NWPT, and Complex wounds) if present, place Wound referral order by RN under : No    New Ostomies, if present place, Ostomy referral order under : Yes     Nurse 1 eSignature: Electronically signed by Petty Goodson RN on 4/2/24 at 1:34 AM EDT    **SHARE this note so that the co-signing nurse can place an eSignature**    Nurse 2 eSignature: Electronically signed by Edgardo Ferreira RN on 4/2/24 at 1:45 AM EDT

## 2024-04-02 NOTE — H&P
V2.0  History and Physical      Name:  Gabe Guzman Jr /Age/Sex: 1960  (63 y.o. male)   MRN & CSN:  [id number] & [id number] Encounter Date/Time: 2024 11:51 PM EDT   Location:   PCP: Kristie Barrow APRN - CNP       Hospital Day: 1    Assessment and Plan:   Gabe Guzman Jr is a 63 y.o. 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    Hospital Problems             Last Modified POA    * (Principal) Chest pain 2024 Yes    COVID 2024 Yes    Acute hypoxemic respiratory failure (HCC) 2024 Yes         Principal Problem:    Chest pain  Patient was initially described to me is presenting with chest pain.  When I speak to him he tells me that his main reason for coming in was sudden shortness of breath.    Active Problems:    COVID  In the setting of hypoxemia admission is warranted for inpatient treatment  Plan:   COVID protocol including antiviral therapy and Decadron      Acute hypoxemic respiratory failure (HCC)  Plan:   Supplemental oxygen to maintain saturation 92% or greater  Patient is oxygenating very well at this time argument could be made that inpatient treatment may not be necessary but will defer this decision to daytime providers to discuss with patient if they feel its appropriate.       Admit to In-Patient    Disposition:   Current Living situation: Home  Expected Disposition: Home  Estimated D/C: 3-4 days    Diet Regular diet   DVT Prophylaxis x Lovenox, heparin, SCDs, ambulation, Eliquis, Xarelto, Coumadin   Code Status Full code   Surrogate Decision Maker/ POA Unknown     Personally reviewed Lab Studies and Imaging     Discussed management with the ED provider and agree with the plan for hospitalization    History from:     patient, Quality of history:  good historian      History of Present Illness:     Chief Complaint: Shortness of  pain, SOB Reason for Exam: sob chest pain FINDINGS: Frontal portable view of the chest.  Normal lung volume.  No focal airspace disease.  Stable benign calcified granuloma in the right lung.  No pleural effusion or pneumothorax.  Stable cardiomediastinal silhouette and great vessels.  Stable regional skeleton.     No acute cardiopulmonary process.          RAYMOND PATEL MD    Thank you Kristie Barrow APRN - CNP for the opportunity to be involved in this patient's care.     Comment: Please note this report has been produced using speech recognition software and may contain errors related to that system including errors in grammar, punctuation, and spelling, as well as words and phrases that may be inappropriate. If there are any questions or concerns, please feel free to contact the dictating provider for clarification.     Electronically signed by RAYMOND PATEL MD on 4/1/2024 at 11:51 PM

## 2024-04-02 NOTE — PROGRESS NOTES
Pt given written and verbal discharge instructions. Pt indicated understanding of home medication and care instructions. Prescriptions decadron and albuterol inhaler to pt preferred pharmacy. Pt packed own belongings. Pt taken down to family car via wheelchair.

## 2024-04-02 NOTE — PROGRESS NOTES
Patient currently resting in bed. On room air and in no distress.  Up ambulating without assistance and tolerating well.  NSR on tele.  Denies any pain or discomfort at this time. Bed in lowest position and wheels locked. Nurse will continue to monitor/reassess. Call light within reach.

## 2024-04-02 NOTE — PROGRESS NOTES
Patient is able to demonstrate the ability to move from a reclining position to an upright position within the recliner.

## 2024-04-02 NOTE — PLAN OF CARE
Problem: Discharge Planning  Goal: Discharge to home or other facility with appropriate resources  Outcome: Progressing     Problem: Chronic Conditions and Co-morbidities  Goal: Patient's chronic conditions and co-morbidity symptoms are monitored and maintained or improved  Outcome: Progressing     Problem: Neurosensory - Adult  Goal: Achieves stable or improved neurological status  Outcome: Progressing  Goal: Absence of seizures  Outcome: Progressing  Goal: Remains free of injury related to seizures activity  Outcome: Progressing  Goal: Achieves maximal functionality and self care  Outcome: Progressing     Problem: Respiratory - Adult  Goal: Achieves optimal ventilation and oxygenation  Outcome: Progressing     Problem: Cardiovascular - Adult  Goal: Maintains optimal cardiac output and hemodynamic stability  Outcome: Progressing  Goal: Absence of cardiac dysrhythmias or at baseline  Outcome: Progressing     Problem: Skin/Tissue Integrity - Adult  Goal: Skin integrity remains intact  Outcome: Progressing  Goal: Incisions, wounds, or drain sites healing without S/S of infection  Outcome: Progressing  Goal: Oral mucous membranes remain intact  Outcome: Progressing     Problem: Musculoskeletal - Adult  Goal: Return mobility to safest level of function  Outcome: Progressing  Goal: Maintain proper alignment of affected body part  Outcome: Progressing  Goal: Return ADL status to a safe level of function  Outcome: Progressing     Problem: Gastrointestinal - Adult  Goal: Minimal or absence of nausea and vomiting  Outcome: Progressing  Goal: Maintains or returns to baseline bowel function  Outcome: Progressing  Goal: Maintains adequate nutritional intake  Outcome: Progressing  Goal: Establish and maintain optimal ostomy function  Outcome: Progressing     Problem: Genitourinary - Adult  Goal: Absence of urinary retention  Outcome: Progressing     Problem: Infection - Adult  Goal: Absence of infection at discharge  Outcome:

## 2024-04-02 NOTE — PROGRESS NOTES
Handoff report and transfer of care given at bedside to Kamila TORRES.  Patient in stable condition, denies needs/concerns at this time.  Call light within reach.

## 2024-04-02 NOTE — PLAN OF CARE
Problem: Discharge Planning  Goal: Discharge to home or other facility with appropriate resources  4/2/2024 1311 by Umm Eaton RN  Outcome: Adequate for Discharge  4/2/2024 0925 by Umm Eaton RN  Outcome: Progressing  4/2/2024 0242 by Petty Goodson RN  Outcome: Progressing     Problem: Chronic Conditions and Co-morbidities  Goal: Patient's chronic conditions and co-morbidity symptoms are monitored and maintained or improved  4/2/2024 1311 by Umm Eaton RN  Outcome: Adequate for Discharge  4/2/2024 0242 by Petty Goodson RN  Outcome: Progressing     Problem: Neurosensory - Adult  Goal: Achieves stable or improved neurological status  4/2/2024 1311 by Umm Eaton RN  Outcome: Adequate for Discharge  4/2/2024 0242 by Petty Goodson RN  Outcome: Progressing     Problem: Neurosensory - Adult  Goal: Absence of seizures  4/2/2024 1311 by Umm Eaton RN  Outcome: Adequate for Discharge  4/2/2024 0242 by Petty Goodson RN  Outcome: Progressing     Problem: Neurosensory - Adult  Goal: Remains free of injury related to seizures activity  4/2/2024 1311 by Umm Eaton RN  Outcome: Adequate for Discharge  4/2/2024 0242 by Petty Goodson RN  Outcome: Progressing     Problem: Neurosensory - Adult  Goal: Achieves maximal functionality and self care  4/2/2024 1311 by Umm Eaton RN  Outcome: Adequate for Discharge  4/2/2024 0242 by Petty Goodson RN  Outcome: Progressing     Problem: Respiratory - Adult  Goal: Achieves optimal ventilation and oxygenation  4/2/2024 1311 by Umm Eaton RN  Outcome: Adequate for Discharge  4/2/2024 0925 by Umm Eaton RN  Outcome: Progressing  4/2/2024 0242 by Petty Goodson RN  Outcome: Progressing     Problem: Cardiovascular - Adult  Goal: Maintains optimal cardiac output and hemodynamic stability  4/2/2024 1311 by Umm Eaton RN  Outcome: Adequate for Discharge  4/2/2024 0242 by Petty Goodson RN  Outcome: Progressing

## 2024-04-02 NOTE — CARE COORDINATION
Review of chart for any potential discharge needs.   +established colostomy pt does colostomy care independently.  +C19 O2 sats this am 97% RA  No needs identified for discharge intervention at this time.   MD and bedside RN  if needs arise please consult case management for discharge intervention.  CM will follow from periphery.

## 2024-04-02 NOTE — PROGRESS NOTES
Admission questions and assessment complete; see flow sheet.   IV flushed without difficulty.  Pt denies pain at this time. Pt denies any needs at this time. Pt educated on use of call light and to call out with needs, verbalized understanding, bed in low locked position for pt safety

## 2024-05-01 DIAGNOSIS — I10 ESSENTIAL HYPERTENSION: ICD-10-CM

## 2024-05-01 RX ORDER — LOSARTAN POTASSIUM 100 MG/1
TABLET ORAL
Qty: 90 TABLET | Refills: 0 | Status: SHIPPED | OUTPATIENT
Start: 2024-05-01

## 2024-05-01 RX ORDER — VERAPAMIL HYDROCHLORIDE 240 MG/1
TABLET, FILM COATED, EXTENDED RELEASE ORAL
Qty: 90 TABLET | Refills: 0 | Status: SHIPPED | OUTPATIENT
Start: 2024-05-01

## 2024-05-01 NOTE — TELEPHONE ENCOUNTER
Last Office Visit  -  6/6/23  Next Office Visit  -      Last Filled  -    Last UDS -    Contract -

## 2024-05-13 NOTE — PROGRESS NOTES
the patient's cardiac risk factors and adjusted pharmacologic treatment as needed. In addition, I have reinforced the need for patient directed risk factor modification.  All questions and concerns were addressed to the patient/family. Alternatives to my treatment were discussed.     Thank you for allowing us to participate in the care of Gabe Guzman Jr. Please call me with any questions [phone].    Chris Casey MD, [facility]  Cardiovascular Disease  [facility]  [phone] [place] Office  [phone] [place] Office  5/14/2024 9:02 AM

## 2024-05-14 ENCOUNTER — ANCILLARY PROCEDURE (OUTPATIENT)
Dept: CARDIOLOGY CLINIC | Age: 64
End: 2024-05-14
Payer: MEDICARE

## 2024-05-14 ENCOUNTER — OFFICE VISIT (OUTPATIENT)
Dept: CARDIOLOGY CLINIC | Age: 64
End: 2024-05-14

## 2024-05-14 VITALS
DIASTOLIC BLOOD PRESSURE: 82 MMHG | OXYGEN SATURATION: 93 % | HEART RATE: 66 BPM | WEIGHT: 191.8 LBS | HEIGHT: 70 IN | BODY MASS INDEX: 27.46 KG/M2 | SYSTOLIC BLOOD PRESSURE: 150 MMHG

## 2024-05-14 DIAGNOSIS — R00.2 PALPITATIONS: ICD-10-CM

## 2024-05-14 DIAGNOSIS — I10 ESSENTIAL HYPERTENSION: ICD-10-CM

## 2024-05-14 DIAGNOSIS — R06.02 SHORTNESS OF BREATH: ICD-10-CM

## 2024-05-14 DIAGNOSIS — R42 DIZZINESS: ICD-10-CM

## 2024-05-14 DIAGNOSIS — R94.31 ABNORMAL ELECTROCARDIOGRAPHY: ICD-10-CM

## 2024-05-14 DIAGNOSIS — I20.9 ANGINA PECTORIS (HCC): Primary | ICD-10-CM

## 2024-05-14 DIAGNOSIS — R94.31 ABNORMAL EKG: ICD-10-CM

## 2024-05-14 RX ORDER — DULOXETIN HYDROCHLORIDE 20 MG/1
20 CAPSULE, DELAYED RELEASE ORAL DAILY
COMMUNITY
Start: 2024-05-10

## 2024-05-14 RX ORDER — CHLORTHALIDONE 25 MG/1
25 TABLET ORAL DAILY
Qty: 30 TABLET | Refills: 5 | Status: SHIPPED | OUTPATIENT
Start: 2024-05-14

## 2024-05-14 RX ORDER — TRAZODONE HYDROCHLORIDE 50 MG/1
TABLET ORAL
COMMUNITY
Start: 2024-05-01

## 2024-05-14 NOTE — PATIENT INSTRUCTIONS
Your provider has ordered testing for further evaluation.  An order/prescription has been included in your paper work.   To schedule outpatient testing, contact Central Scheduling by calling Modify ([phone]).

## 2024-05-29 DIAGNOSIS — E03.9 ACQUIRED HYPOTHYROIDISM: ICD-10-CM

## 2024-05-29 NOTE — TELEPHONE ENCOUNTER
NATALIA for pt to call back to verify he is still a pt here and to schedule a f/u   Last seen 6/6/23

## 2024-06-03 RX ORDER — LEVOTHYROXINE SODIUM 0.03 MG/1
TABLET ORAL
Qty: 30 TABLET | Refills: 0 | Status: SHIPPED | OUTPATIENT
Start: 2024-06-03

## 2024-06-04 ENCOUNTER — HOSPITAL ENCOUNTER (OUTPATIENT)
Dept: NUCLEAR MEDICINE | Age: 64
Discharge: HOME | End: 2024-06-04
Attending: INTERNAL MEDICINE
Payer: MEDICARE

## 2024-06-04 ENCOUNTER — HOSPITAL ENCOUNTER (OUTPATIENT)
Age: 64
Discharge: HOME | End: 2024-06-06
Attending: INTERNAL MEDICINE
Payer: MEDICARE

## 2024-06-04 VITALS — BODY MASS INDEX: 27.35 KG/M2 | HEIGHT: 70 IN | WEIGHT: 191 LBS

## 2024-06-04 DIAGNOSIS — R06.02 SHORTNESS OF BREATH: ICD-10-CM

## 2024-06-04 DIAGNOSIS — I20.9 ANGINA PECTORIS (HCC): ICD-10-CM

## 2024-06-04 DIAGNOSIS — R94.31 ABNORMAL ELECTROCARDIOGRAPHY: ICD-10-CM

## 2024-06-04 LAB
ECHO BSA: 2.07 M2
NUC STRESS EJECTION FRACTION: 74 %
NUC STRESS LV EDV: 126 ML (ref 67–155)
NUC STRESS LV ESV: 33 ML (ref 22–58)
NUC STRESS LV MASS: 159 G
STRESS BASELINE DIAS BP: 87 MMHG
STRESS BASELINE HR: 52 BPM
STRESS BASELINE SYS BP: 154 MMHG
STRESS ESTIMATED WORKLOAD: 1 METS
STRESS PEAK DIAS BP: 77 MMHG
STRESS PEAK SYS BP: 226 MMHG
STRESS PERCENT HR ACHIEVED: 75 %
STRESS POST PEAK HR: 118 BPM
STRESS RATE PRESSURE PRODUCT: NORMAL BPM*MMHG
STRESS TARGET HR: 157 BPM
TID: 1.04

## 2024-06-04 PROCEDURE — 78452 HT MUSCLE IMAGE SPECT MULT: CPT

## 2024-06-04 PROCEDURE — 93017 CV STRESS TEST TRACING ONLY: CPT

## 2024-06-04 PROCEDURE — 3430000000 HC RX DIAGNOSTIC RADIOPHARMACEUTICAL: Performed by: INTERNAL MEDICINE

## 2024-06-04 PROCEDURE — 6360000002 HC RX W HCPCS: Performed by: INTERNAL MEDICINE

## 2024-06-04 PROCEDURE — A9502 TC99M TETROFOSMIN: HCPCS | Performed by: INTERNAL MEDICINE

## 2024-06-04 RX ORDER — REGADENOSON 0.08 MG/ML
0.4 INJECTION, SOLUTION INTRAVENOUS
Status: COMPLETED | OUTPATIENT
Start: 2024-06-04 | End: 2024-06-04

## 2024-06-04 RX ADMIN — TETROFOSMIN 31 MILLICURIE: 1.38 INJECTION, POWDER, LYOPHILIZED, FOR SOLUTION INTRAVENOUS at 11:30

## 2024-06-04 RX ADMIN — REGADENOSON 0.4 MG: 0.08 INJECTION, SOLUTION INTRAVENOUS at 11:30

## 2024-06-04 RX ADMIN — TETROFOSMIN 10 MILLICURIE: 1.38 INJECTION, POWDER, LYOPHILIZED, FOR SOLUTION INTRAVENOUS at 10:11

## 2024-06-04 NOTE — NURSING NOTE
Pt completed stress portion of cardiac stress test. Patient attempted gxt test, was unable to reach THR. Walked for approximately 6 minutes then requested treadmill stopped related to fatigue and difficulty keeping pace. Patient also complained of shortness of breath and 4/10 chest tightness during exercise. All symptoms resolved with rest. Patient was converted to lexiscan at 11 minutes into the test. Complained of shortness of breath and light-headedness after lexiscan, symptoms resolved in recovery. Pt is discharged to nuclear department for final scan. Nuclear tech will remove PIV. Discharge instructions given to pt. Pt verbalizes understanding to discharge instructions.

## 2024-06-05 ENCOUNTER — TELEPHONE (OUTPATIENT)
Dept: CARDIOLOGY CLINIC | Age: 64
End: 2024-06-05

## 2024-06-05 NOTE — TELEPHONE ENCOUNTER
----- Message from Vj Peñaloza MD sent at 6/4/2024  4:21 PM EDT -----  Please notify patient that their stress test looks ok  There is an abnormality that appears to be artifact and not represent heart attack or blockage  Overall low risk result  Cont current tx plan

## 2024-06-13 ENCOUNTER — OFFICE VISIT (OUTPATIENT)
Dept: FAMILY MEDICINE CLINIC | Age: 64
End: 2024-06-13

## 2024-06-13 VITALS
BODY MASS INDEX: 27.99 KG/M2 | WEIGHT: 189 LBS | HEIGHT: 69 IN | OXYGEN SATURATION: 96 % | HEART RATE: 58 BPM | DIASTOLIC BLOOD PRESSURE: 82 MMHG | SYSTOLIC BLOOD PRESSURE: 132 MMHG

## 2024-06-13 DIAGNOSIS — R73.03 PREDIABETES: ICD-10-CM

## 2024-06-13 DIAGNOSIS — E03.9 HYPOTHYROIDISM, UNSPECIFIED TYPE: ICD-10-CM

## 2024-06-13 DIAGNOSIS — E87.6 LOW BLOOD POTASSIUM: ICD-10-CM

## 2024-06-13 DIAGNOSIS — Z87.891 PERSONAL HISTORY OF TOBACCO USE: ICD-10-CM

## 2024-06-13 DIAGNOSIS — R42 DIZZY SPELLS: ICD-10-CM

## 2024-06-13 DIAGNOSIS — E78.00 HIGH CHOLESTEROL: ICD-10-CM

## 2024-06-13 DIAGNOSIS — E55.9 VITAMIN D DEFICIENCY: ICD-10-CM

## 2024-06-13 DIAGNOSIS — Z00.00 MEDICARE ANNUAL WELLNESS VISIT, SUBSEQUENT: Primary | ICD-10-CM

## 2024-06-13 DIAGNOSIS — R79.0 LOW MAGNESIUM LEVEL: ICD-10-CM

## 2024-06-13 DIAGNOSIS — F32.A DEPRESSION, UNSPECIFIED DEPRESSION TYPE: ICD-10-CM

## 2024-06-13 LAB
25(OH)D3 SERPL-MCNC: 29.4 NG/ML
BASOPHILS # BLD: 0 K/UL (ref 0–0.2)
BASOPHILS NFR BLD: 0.3 %
DEPRECATED RDW RBC AUTO: 14.3 % (ref 12.4–15.4)
ECHO BSA: 2.07 M2
EOSINOPHIL # BLD: 0.1 K/UL (ref 0–0.6)
EOSINOPHIL NFR BLD: 1.3 %
HCT VFR BLD AUTO: 43.8 % (ref 40.5–52.5)
HGB BLD-MCNC: 14.8 G/DL (ref 13.5–17.5)
LYMPHOCYTES # BLD: 1.2 K/UL (ref 1–5.1)
LYMPHOCYTES NFR BLD: 21.2 %
MCH RBC QN AUTO: 30.8 PG (ref 26–34)
MCHC RBC AUTO-ENTMCNC: 33.7 G/DL (ref 31–36)
MCV RBC AUTO: 91.4 FL (ref 80–100)
MONOCYTES # BLD: 0.6 K/UL (ref 0–1.3)
MONOCYTES NFR BLD: 11.3 %
NEUTROPHILS # BLD: 3.6 K/UL (ref 1.7–7.7)
NEUTROPHILS NFR BLD: 65.9 %
PLATELET # BLD AUTO: 232 K/UL (ref 135–450)
PMV BLD AUTO: 8.2 FL (ref 5–10.5)
RBC # BLD AUTO: 4.79 M/UL (ref 4.2–5.9)
WBC # BLD AUTO: 5.4 K/UL (ref 4–11)

## 2024-06-13 PROCEDURE — 93228 REMOTE 30 DAY ECG REV/REPORT: CPT | Performed by: INTERNAL MEDICINE

## 2024-06-13 SDOH — ECONOMIC STABILITY: HOUSING INSECURITY
IN THE LAST 12 MONTHS, WAS THERE A TIME WHEN YOU DID NOT HAVE A STEADY PLACE TO SLEEP OR SLEPT IN A SHELTER (INCLUDING NOW)?: NO

## 2024-06-13 SDOH — ECONOMIC STABILITY: INCOME INSECURITY: HOW HARD IS IT FOR YOU TO PAY FOR THE VERY BASICS LIKE FOOD, HOUSING, MEDICAL CARE, AND HEATING?: SOMEWHAT HARD

## 2024-06-13 SDOH — ECONOMIC STABILITY: FOOD INSECURITY: WITHIN THE PAST 12 MONTHS, THE FOOD YOU BOUGHT JUST DIDN'T LAST AND YOU DIDN'T HAVE MONEY TO GET MORE.: SOMETIMES TRUE

## 2024-06-13 SDOH — ECONOMIC STABILITY: FOOD INSECURITY: WITHIN THE PAST 12 MONTHS, YOU WORRIED THAT YOUR FOOD WOULD RUN OUT BEFORE YOU GOT MONEY TO BUY MORE.: SOMETIMES TRUE

## 2024-06-13 ASSESSMENT — PATIENT HEALTH QUESTIONNAIRE - PHQ9
2. FEELING DOWN, DEPRESSED OR HOPELESS: NEARLY EVERY DAY
9. THOUGHTS THAT YOU WOULD BE BETTER OFF DEAD, OR OF HURTING YOURSELF: NOT AT ALL
SUM OF ALL RESPONSES TO PHQ9 QUESTIONS 1 & 2: 6
4. FEELING TIRED OR HAVING LITTLE ENERGY: NEARLY EVERY DAY
7. TROUBLE CONCENTRATING ON THINGS, SUCH AS READING THE NEWSPAPER OR WATCHING TELEVISION: SEVERAL DAYS
SUM OF ALL RESPONSES TO PHQ QUESTIONS 1-9: 19
6. FEELING BAD ABOUT YOURSELF - OR THAT YOU ARE A FAILURE OR HAVE LET YOURSELF OR YOUR FAMILY DOWN: SEVERAL DAYS
5. POOR APPETITE OR OVEREATING: NEARLY EVERY DAY
1. LITTLE INTEREST OR PLEASURE IN DOING THINGS: NEARLY EVERY DAY
10. IF YOU CHECKED OFF ANY PROBLEMS, HOW DIFFICULT HAVE THESE PROBLEMS MADE IT FOR YOU TO DO YOUR WORK, TAKE CARE OF THINGS AT HOME, OR GET ALONG WITH OTHER PEOPLE: SOMEWHAT DIFFICULT
SUM OF ALL RESPONSES TO PHQ QUESTIONS 1-9: 19
SUM OF ALL RESPONSES TO PHQ QUESTIONS 1-9: 19
8. MOVING OR SPEAKING SO SLOWLY THAT OTHER PEOPLE COULD HAVE NOTICED. OR THE OPPOSITE, BEING SO FIGETY OR RESTLESS THAT YOU HAVE BEEN MOVING AROUND A LOT MORE THAN USUAL: NEARLY EVERY DAY
SUM OF ALL RESPONSES TO PHQ QUESTIONS 1-9: 19
3. TROUBLE FALLING OR STAYING ASLEEP: MORE THAN HALF THE DAYS

## 2024-06-13 ASSESSMENT — LIFESTYLE VARIABLES
HOW OFTEN DO YOU HAVE A DRINK CONTAINING ALCOHOL: NEVER
HOW MANY STANDARD DRINKS CONTAINING ALCOHOL DO YOU HAVE ON A TYPICAL DAY: PATIENT DOES NOT DRINK

## 2024-06-13 NOTE — PATIENT INSTRUCTIONS
night.     Limit alcohol to 2 drinks a day for men and 1 drink a day for women. Too much alcohol can cause health problems.     Manage other health problems such as diabetes, high blood pressure, and high cholesterol. If you think you may have a problem with alcohol or drug use, talk to your doctor.   Medicines    Take your medicines exactly as prescribed. Call your doctor if you think you are having a problem with your medicine.     If your doctor recommends aspirin, take the amount directed each day. Make sure you take aspirin and not another kind of pain reliever, such as acetaminophen (Tylenol).   When should you call for help?   Call 911 if you have symptoms of a heart attack. These may include:    Chest pain or pressure, or a strange feeling in the chest.     Sweating.     Shortness of breath.     Pain, pressure, or a strange feeling in the back, neck, jaw, or upper belly or in one or both shoulders or arms.     Lightheadedness or sudden weakness.     A fast or irregular heartbeat.   After you call 911, the  may tell you to chew 1 adult-strength or 2 to 4 low-dose aspirin. Wait for an ambulance. Do not try to drive yourself.  Watch closely for changes in your health, and be sure to contact your doctor if you have any problems.  Where can you learn more?  Go to https://www.Bevvy.net/patientEd and enter F075 to learn more about \"A Healthy Heart: Care Instructions.\"  Current as of: June 24, 2023  Content Version: 14.1  © 2006-2024 [employer].   Care instructions adapted under license by [facility]. If you have questions about a medical condition or this instruction, always ask your healthcare professional. [employer] disclaims any warranty or liability for your use of this information.      Personalized Preventive Plan for Gabe Guzman Jr - 6/13/2024  Medicare offers a range of preventive health benefits. Some of the tests and screenings are paid in full while other

## 2024-06-13 NOTE — PROGRESS NOTES
into the lungs 4 times daily as needed for Wheezing  Patient not taking: Reported on 5/14/2024  Sandy Victor DO CareTeam (Including outside providers/suppliers regularly involved in providing care):   Patient Care Team:  Donna Penn APRN - CNP as PCP - General (Family Nurse Practitioner)  Donna Penn APRN - CNP as PCP - Empaneled Provider  Travon Rivas MD (Orthopedic Surgery)  Stanfield, Denver Thomas, MD (Orthopedic Surgery)  Mary Jane Negrete PA-C as Physician Assistant (Orthopedic Surgery)     Reviewed and updated this visit:  Tobacco  Allergies  Meds  Problems  Med Hx  Surg Hx  Soc Hx  Fam Hx

## 2024-06-14 ENCOUNTER — TELEPHONE (OUTPATIENT)
Dept: CARDIOLOGY CLINIC | Age: 64
End: 2024-06-14

## 2024-06-14 LAB
ALBUMIN SERPL-MCNC: 4.4 G/DL (ref 3.4–5)
ALBUMIN/GLOB SERPL: 1.9 {RATIO} (ref 1.1–2.2)
ALP SERPL-CCNC: 91 U/L (ref 40–129)
ALT SERPL-CCNC: 8 U/L (ref 10–40)
ANION GAP SERPL CALCULATED.3IONS-SCNC: 10 MMOL/L (ref 3–16)
AST SERPL-CCNC: 12 U/L (ref 15–37)
BILIRUB SERPL-MCNC: 0.6 MG/DL (ref 0–1)
BUN SERPL-MCNC: 13 MG/DL (ref 7–20)
CALCIUM SERPL-MCNC: 9.3 MG/DL (ref 8.3–10.6)
CHLORIDE SERPL-SCNC: 104 MMOL/L (ref 99–110)
CHOLEST SERPL-MCNC: 163 MG/DL (ref 0–199)
CO2 SERPL-SCNC: 27 MMOL/L (ref 21–32)
CREAT SERPL-MCNC: 0.8 MG/DL (ref 0.8–1.3)
EST. AVERAGE GLUCOSE BLD GHB EST-MCNC: 108.3 MG/DL
GFR SERPLBLD CREATININE-BSD FMLA CKD-EPI: >90 ML/MIN/{1.73_M2}
GLUCOSE SERPL-MCNC: 88 MG/DL (ref 70–99)
HBA1C MFR BLD: 5.4 %
HDLC SERPL-MCNC: 49 MG/DL (ref 40–60)
LDLC SERPL CALC-MCNC: 99 MG/DL
MAGNESIUM SERPL-MCNC: 2 MG/DL (ref 1.8–2.4)
POTASSIUM SERPL-SCNC: 4.3 MMOL/L (ref 3.5–5.1)
PROT SERPL-MCNC: 6.7 G/DL (ref 6.4–8.2)
SODIUM SERPL-SCNC: 141 MMOL/L (ref 136–145)
T4 FREE SERPL-MCNC: 1.4 NG/DL (ref 0.9–1.8)
TRIGL SERPL-MCNC: 77 MG/DL (ref 0–150)
TSH SERPL DL<=0.005 MIU/L-ACNC: 1.43 UIU/ML (ref 0.27–4.2)
VLDLC SERPL CALC-MCNC: 15 MG/DL

## 2024-06-14 NOTE — TELEPHONE ENCOUNTER
----- Message from Chris Casey MD sent at 6/14/2024  9:14 AM EDT -----  No significant or sustained abnormal heart rhythms.  Brief atrial runs noted that are not harmful to the patient.  I would not recommend changing therapies based on this result.

## 2024-07-01 DIAGNOSIS — E03.9 ACQUIRED HYPOTHYROIDISM: ICD-10-CM

## 2024-07-01 RX ORDER — LEVOTHYROXINE SODIUM 0.03 MG/1
TABLET ORAL
Qty: 30 TABLET | Refills: 0 | Status: SHIPPED | OUTPATIENT
Start: 2024-07-01

## 2024-07-01 NOTE — TELEPHONE ENCOUNTER
Last Office Visit  -  6/13/24  Next Office Visit  -  n/a    Last Filled  -    Last UDS -    Contract -

## 2024-07-31 DIAGNOSIS — E03.9 ACQUIRED HYPOTHYROIDISM: ICD-10-CM

## 2024-07-31 RX ORDER — LEVOTHYROXINE SODIUM 0.03 MG/1
TABLET ORAL
Qty: 30 TABLET | Refills: 0 | Status: SHIPPED | OUTPATIENT
Start: 2024-07-31

## 2024-07-31 NOTE — TELEPHONE ENCOUNTER
Last Office Visit  -  06/13/2024  Next Office Visit  -  n/a    Last Filled  -    Last UDS -    Contract -

## 2024-08-30 DIAGNOSIS — I10 ESSENTIAL HYPERTENSION: ICD-10-CM

## 2024-08-30 RX ORDER — VERAPAMIL HYDROCHLORIDE 240 MG/1
TABLET, FILM COATED, EXTENDED RELEASE ORAL
Qty: 90 TABLET | Refills: 1 | Status: SHIPPED | OUTPATIENT
Start: 2024-08-30

## 2024-08-30 RX ORDER — LOSARTAN POTASSIUM 100 MG/1
TABLET ORAL
Qty: 90 TABLET | Refills: 0 | Status: SHIPPED | OUTPATIENT
Start: 2024-08-30

## 2024-09-26 DIAGNOSIS — E03.9 ACQUIRED HYPOTHYROIDISM: ICD-10-CM

## 2024-09-27 RX ORDER — LEVOTHYROXINE SODIUM 25 UG/1
TABLET ORAL
Qty: 30 TABLET | Refills: 3 | Status: SHIPPED | OUTPATIENT
Start: 2024-09-27

## 2024-12-27 DIAGNOSIS — E03.9 ACQUIRED HYPOTHYROIDISM: ICD-10-CM

## 2024-12-27 DIAGNOSIS — I10 ESSENTIAL HYPERTENSION: ICD-10-CM

## 2024-12-27 RX ORDER — LOSARTAN POTASSIUM 100 MG/1
TABLET ORAL
Qty: 90 TABLET | Refills: 0 | Status: SHIPPED | OUTPATIENT
Start: 2024-12-27

## 2024-12-27 RX ORDER — LEVOTHYROXINE SODIUM 25 UG/1
TABLET ORAL
Qty: 30 TABLET | Refills: 3 | Status: SHIPPED | OUTPATIENT
Start: 2024-12-27

## 2024-12-27 NOTE — TELEPHONE ENCOUNTER
Last Office Visit: 5/14/2024 Provider: FABIAN  **Is provider OOT? Yes    Next Office Visit: 3/11/25 Provider: FABIAN  **If no OV, when does pt need to be seen? N/a  **Has patient already had 30 day supply? No    Lab orders needed? no   Encounter provider correct? Yes If not, change provider  Script changes since last refill? no    LAST LABS:   CMP:   Lab Results   Component Value Date     06/13/2024    K 4.3 06/13/2024     06/13/2024    CO2 27 06/13/2024    BUN 13 06/13/2024    CREATININE 0.8 06/13/2024    GLUCOSE 88 06/13/2024    CALCIUM 9.3 06/13/2024    BILITOT 0.6 06/13/2024    ALKPHOS 91 06/13/2024    AST 12 (L) 06/13/2024    ALT 8 (L) 06/13/2024    LABGLOM >90 06/13/2024    GFRAA >60 06/15/2021    AGRATIO 1.9 06/13/2024    GLOB 3.6 06/15/2021     **Care Everywhere? N/A

## 2024-12-27 NOTE — TELEPHONE ENCOUNTER
Last Office Visit  -  6/13/24  Next Office Visit  -  n/a    Last Filled  -  8/30/24  Last UDS -    Contract -

## 2024-12-27 NOTE — TELEPHONE ENCOUNTER
Last Office Visit  -  6/13/24  Next Office Visit  -  n/a    Last Filled  -  9/27/24  Last UDS -    Contract -

## 2024-12-30 RX ORDER — CHLORTHALIDONE 25 MG/1
25 TABLET ORAL DAILY
Qty: 90 TABLET | Refills: 2 | Status: SHIPPED | OUTPATIENT
Start: 2024-12-30

## 2025-01-23 DIAGNOSIS — E03.9 ACQUIRED HYPOTHYROIDISM: ICD-10-CM

## 2025-01-23 RX ORDER — LEVOTHYROXINE SODIUM 25 UG/1
TABLET ORAL
Qty: 30 TABLET | Refills: 3 | Status: SHIPPED | OUTPATIENT
Start: 2025-01-23

## 2025-01-23 NOTE — TELEPHONE ENCOUNTER
Chief Complaint   Patient presents with   • Follow-up     to ultrasound     HISTORY OF PRESENT ILLNESS:  Maged Law 35 year old male is here for following problems.    1.  GERD-he has been taking Nexium twice a day and has made changes in his diet that seems to be overall helping his symptoms that he was seen for in December.  He hasn't seen a GI specialist yet because he is feeling better     2.  Here to discuss ultrasound results-no gallstones but there is a presence of 2 mm polyp in the gallbladder wall       Past Medical History:   Diagnosis Date   • Chronic low back pain    • Essential hypertension, benign    • Gallbladder polyp 1/25/2018   • Gastroesophageal reflux disease without esophagitis 7/18/2017   • Sleep disorder     uses CPAP        Current Outpatient Prescriptions   Medication Sig Dispense Refill   • cholecalciferol (VITAMIN D3) 1000 UNITS tablet Take 3,000 Units by mouth daily.     • sulfacetamide 10 % lotion Apply topically 2 times daily. 1 Bottle 1   • esomeprazole (NEXIUM) 40 MG capsule Take 1 capsule by mouth daily (before breakfast). (and if needed, twice a day) 30 capsule 1   • clindamycin (CLEOCIN-T) 1 % external solution Apply topically 2 times daily. 30 mL 5   • cetirizine (ZYRTEC ALLERGY) 10 MG tablet Take 10 mg by mouth daily. Indications: Hayfever       No current facility-administered medications for this visit.        ALLERGIES:   Allergen Reactions   • Aspirin      Lip swelling   • Dander      Rhinitis   • Pollen        REVIEW OF SYSTEMS:  No fever, cough, chest pain  Rest ROS (review of systems) as per HPI (history of present illness)      PHYSICAL EXAMINATION:  VITAL SIGNS:    Visit Vitals  /80   Pulse 54   Ht 5' 7\" (1.702 m)   Wt 73.3 kg   SpO2 98%   BMI 25.31 kg/m²     CONSTITUTIONAL: Patient appears healthy and appropriate for age, and is in no distress.  EYES:  Normal conjunctivae and lids.  PERRLA (pupils equal, round, reactive to light and accomodation.   PSYCHIATRIC:   Last Office Visit  -  6/13/24  Next Office Visit  -  n/a    Last Filled  -  12/27/24  Last UDS -    Contract -     Normal judgment and insight.  Alert and oriented x3.    ASSESSMENT:  (K21.9) Gastroesophageal reflux disease without esophagitis  (primary encounter diagnosis)  Comment: Explained in detail how to follow GERD diet to prevent flareups  Plan: He can decrease Nexium to once a day and gradually stop and see how he does if he can continue to watch his diet for caffeine and alcohol    (K82.4) Gallbladder polyp  Comment: Discussed at length about possibly a benign polyp  Plan: US LIVER / GALLBLADDER / PANCREAS        Recheck ultrasound in 6 months to compare and follow-up with ultrasound results  Order given today.  If persistent GERD symptoms he should follow-up with GI as per previous referral for possible endoscopy        Return in about 6 months (around 7/25/2018).

## 2025-02-07 DIAGNOSIS — R11.0 NAUSEA: ICD-10-CM

## 2025-02-07 RX ORDER — ONDANSETRON 4 MG/1
4 TABLET, ORALLY DISINTEGRATING ORAL EVERY 8 HOURS PRN
Qty: 30 TABLET | Refills: 11 | Status: SHIPPED | OUTPATIENT
Start: 2025-02-07

## 2025-02-07 NOTE — TELEPHONE ENCOUNTER
Last Office Visit  -  6/13/24  Next Office Visit  -  2/13/25    Last Filled  -  12/4/23  Last UDS -    Contract -

## 2025-02-07 NOTE — TELEPHONE ENCOUNTER
Patient is requesting a rx for   ondansetron (ZOFRAN-ODT) 4 MG disintegrating tablet .    Last seen 6/13/2024    Next visit   Future Appointments   Date Time Provider Department Center   2/13/2025  2:40 PM Donna Penn APRN - CNP [facility]   3/11/2025 11:00 AM Chris Casey MD [facility]     Please use Rosie in [place]

## 2025-02-13 ENCOUNTER — OFFICE VISIT (OUTPATIENT)
Dept: FAMILY MEDICINE CLINIC | Age: 65
End: 2025-02-13

## 2025-02-13 VITALS
OXYGEN SATURATION: 95 % | BODY MASS INDEX: 29.03 KG/M2 | HEIGHT: 69 IN | WEIGHT: 196 LBS | SYSTOLIC BLOOD PRESSURE: 120 MMHG | DIASTOLIC BLOOD PRESSURE: 82 MMHG | HEART RATE: 66 BPM

## 2025-02-13 DIAGNOSIS — M19.90 ARTHRITIS: ICD-10-CM

## 2025-02-13 DIAGNOSIS — J96.01 ACUTE RESPIRATORY FAILURE WITH HYPOXIA: ICD-10-CM

## 2025-02-13 DIAGNOSIS — K94.00 COLOSTOMY COMPLICATION (HCC): ICD-10-CM

## 2025-02-13 DIAGNOSIS — F11.10 NARCOTIC ABUSE (HCC): ICD-10-CM

## 2025-02-13 DIAGNOSIS — Z00.00 MEDICARE ANNUAL WELLNESS VISIT, SUBSEQUENT: Primary | ICD-10-CM

## 2025-02-13 PROBLEM — U07.1 COVID-19: Status: RESOLVED | Noted: 2024-04-01 | Resolved: 2025-02-13

## 2025-02-13 RX ORDER — DULOXETIN HYDROCHLORIDE 60 MG/1
60 CAPSULE, DELAYED RELEASE ORAL DAILY
COMMUNITY
Start: 2025-01-23

## 2025-02-13 RX ORDER — MELOXICAM 15 MG/1
15 TABLET ORAL DAILY
Qty: 30 TABLET | Refills: 3 | Status: SHIPPED | OUTPATIENT
Start: 2025-02-13

## 2025-02-13 RX ORDER — LAMOTRIGINE 150 MG/1
150 TABLET ORAL DAILY
COMMUNITY
Start: 2025-01-31

## 2025-02-13 SDOH — ECONOMIC STABILITY: FOOD INSECURITY: WITHIN THE PAST 12 MONTHS, YOU WORRIED THAT YOUR FOOD WOULD RUN OUT BEFORE YOU GOT MONEY TO BUY MORE.: SOMETIMES TRUE

## 2025-02-13 SDOH — ECONOMIC STABILITY: FOOD INSECURITY: WITHIN THE PAST 12 MONTHS, THE FOOD YOU BOUGHT JUST DIDN'T LAST AND YOU DIDN'T HAVE MONEY TO GET MORE.: SOMETIMES TRUE

## 2025-02-13 ASSESSMENT — PATIENT HEALTH QUESTIONNAIRE - PHQ9
1. LITTLE INTEREST OR PLEASURE IN DOING THINGS: NEARLY EVERY DAY
6. FEELING BAD ABOUT YOURSELF - OR THAT YOU ARE A FAILURE OR HAVE LET YOURSELF OR YOUR FAMILY DOWN: MORE THAN HALF THE DAYS
3. TROUBLE FALLING OR STAYING ASLEEP: NEARLY EVERY DAY
5. POOR APPETITE OR OVEREATING: NEARLY EVERY DAY
SUM OF ALL RESPONSES TO PHQ QUESTIONS 1-9: 18
SUM OF ALL RESPONSES TO PHQ QUESTIONS 1-9: 18
10. IF YOU CHECKED OFF ANY PROBLEMS, HOW DIFFICULT HAVE THESE PROBLEMS MADE IT FOR YOU TO DO YOUR WORK, TAKE CARE OF THINGS AT HOME, OR GET ALONG WITH OTHER PEOPLE: NOT DIFFICULT AT ALL
9. THOUGHTS THAT YOU WOULD BE BETTER OFF DEAD, OR OF HURTING YOURSELF: NOT AT ALL
2. FEELING DOWN, DEPRESSED OR HOPELESS: MORE THAN HALF THE DAYS
8. MOVING OR SPEAKING SO SLOWLY THAT OTHER PEOPLE COULD HAVE NOTICED. OR THE OPPOSITE, BEING SO FIGETY OR RESTLESS THAT YOU HAVE BEEN MOVING AROUND A LOT MORE THAN USUAL: SEVERAL DAYS
SUM OF ALL RESPONSES TO PHQ9 QUESTIONS 1 & 2: 5
7. TROUBLE CONCENTRATING ON THINGS, SUCH AS READING THE NEWSPAPER OR WATCHING TELEVISION: SEVERAL DAYS
4. FEELING TIRED OR HAVING LITTLE ENERGY: NEARLY EVERY DAY
SUM OF ALL RESPONSES TO PHQ QUESTIONS 1-9: 18
SUM OF ALL RESPONSES TO PHQ QUESTIONS 1-9: 18

## 2025-02-13 ASSESSMENT — LIFESTYLE VARIABLES
HOW MANY STANDARD DRINKS CONTAINING ALCOHOL DO YOU HAVE ON A TYPICAL DAY: PATIENT DOES NOT DRINK
HOW OFTEN DO YOU HAVE A DRINK CONTAINING ALCOHOL: NEVER

## 2025-02-13 NOTE — PROGRESS NOTES
Medicare Annual Wellness Visit    Gabe Guzman Jr is here for Medicare AWV (Stomach pain, joint pain ) and Referral - General    Assessment & Plan   Assessment & Plan  Colostomy complication (HCC)    Will have specialist check the stoma and see what can be done when it bleeds     Orders:    Edy Holloway MD, Colorectal Surgery, [place]    Narcotic abuse (HCC)    Used to be on chronic pain meds   No longer on chronic pain meds       Acute respiratory failure with hypoxia    Stable breathing         Arthritis    Discussed that he was likely taken off the ibuprofen due to his heart and HTN- NSAIDS can he hard on the heart and BP if taken all the time- not many choices for arthritis- way risk/benefits- will try mobic- he will talk to cardiology next month    Orders:    meloxicam (MOBIC) 15 MG tablet; Take 1 tablet by mouth daily    Medicare annual wellness visit, subsequent              No follow-ups on file.     Subjective   The following acute and/or chronic problems were also addressed today:  He states sometimes his stoma bleeds on the edges- he states the edges are like petals and not smooth edge- he states his old provider used to give him some kind of Qtip thing that he would touch the spots on the stoma and it would stop the bleeding. He was not sure what it was- ? Silver nitrate? But I am not sure if this could be used so will have him see the colon specialist to look at the stoma.     Arthritis: pain in his hands- feels like he can hardly  things anymore- was taken off 800mg ibuprofens last time he saw cardiology but he has had the pain and stiffness in his hands since then.              Patient's complete Health Risk Assessment and screening values have been reviewed and are found in Flowsheets. The following problems were reviewed today and where indicated follow up appointments were made and/or referrals ordered.    Positive Risk Factor Screenings with Interventions:    Fall Risk:  Do

## 2025-02-13 NOTE — PATIENT INSTRUCTIONS
[employer] 211   Speak to a trained professional 24/7 who can connect you to essential community services including food, clothing, transportation, housing, utilities, employment services, childcare, and baby supplies. 211 serves nationwide.  [facility]Domainindex.com for resources in [place], General acute hospital, [place] and [facility] in [place]; [place], [place], [place], and [facility] in [place].   [employer].org/resources for resources in [place], [place], [place], [place], [place], [place], [place], [place], [facility], [place], [place], and [facility] in [place].    Fe[employer] Wanda   What they offer: [employer]anda is a nationwide network of food lee, food pantries, and meal programs.  Website: https://www.feedingamerica.org/find-your-local-foodDignity Health [facility] - [facility]      National Hunger Hotline  What they offer: The hotline is a resource for individuals and families seeking information on how and where to obtain food.   Website:  https://www.hungerfreeamerica.org/en-us/[facility]-national-hunger-hotline    Hunger Hotline Phone Number: 9-929-4-HUNGVANESSA (1-723.289.6970)  Hours of Operation: Monday - Friday 7am to 10pm   The Hunger Hotline also operates a texting service. Our number is 252-287-3538. Please note that these are automated texts and we do not reply or monitor texts.   [facility]  What they offer: $1 for $1 matching for families receiving SNAP/food stamps when spent on “healthy” food.  The match money can be used to purchase fruits and vegetables so adds more healthy food choices for SNAP beneficiaries.   Contact: https://[employer]/locations/           SNAP (formerly Food [place])   What they offer: SNAP is used like cash to buy eligible food items from authorized retailers.  Apply for benefits online: https://jfs.[place].gov/ofam/foodstamps.stm     Apply for benefits by phone or in-person by visiting your local Job and Family Services. Locate your county's Job and family services by searching the directory at

## 2025-03-10 NOTE — PROGRESS NOTES
CARDIOLOGY FOLLOW UP        Patient Name: Gabe Guzman Jr  Primary Care physician: Donna Penn, APRN - CNP    Reason for Referral/Chief Complaint: Gabe Guzman Jr is a 64 y.o. patient who presents to cardiology clinic today for hospital follow up.     History of Present Illness:   Gabe Guzman Jr is a 64 y.o. 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  Patient has been followed over time for atypical chest discomfort and palpitations.    LOV 5/14/24 at which time he chest pain/pressure almost at least weekly, several episodes.  Endorsed lightheadedness and dizziness as well.  Unclear if anxiety was contributing.  He had recently been started on Cymbalta.  He was treated with Xanax for breakthrough symptoms.  Subsequent cardiac testing was ordered    A 1 week cardiac monitor was worn x 3 days  5/14/24- 5/17/24 showing rare PAC's/PVC's,  brief paroxsymal atrial arrhythmias noted, longest 8 beats.   Stress Test 6/4/24 showed attenuation artifact with no clear inducible ischemia.    Today he reports having random light headed spells.  Some imbalance and hard to focus when it occurs.  States usually lasts 5-10 minutes.  Had an episode yesterday.  He was planting a flower outdoors in the heat and had onset lightheadedness.  Episode lasted longer than usual, about 2 hours.  Cannot pinpoint when it will occur.  Just happens randomly.  Did not feel dehydrated.  Has recently noticed his chest has been hurting on waking in the AM.  Pain can last 20 minutes to 1.5 hours. Otherwise pain does not occur with his daily activities or doing exertional activities.  Patient denies sob, palpitations or syncope.   States he does have anxiety and is being treated for this.  Of note:  Started smoking again, 0.5 PPD.  Does take Adderall.  Denies illicit drug use.    Past

## 2025-03-11 ENCOUNTER — OFFICE VISIT (OUTPATIENT)
Dept: CARDIOLOGY CLINIC | Age: 65
End: 2025-03-11
Payer: MEDICARE

## 2025-03-11 VITALS
DIASTOLIC BLOOD PRESSURE: 70 MMHG | BODY MASS INDEX: 27.92 KG/M2 | OXYGEN SATURATION: 93 % | HEIGHT: 70 IN | WEIGHT: 195 LBS | SYSTOLIC BLOOD PRESSURE: 126 MMHG | HEART RATE: 63 BPM

## 2025-03-11 DIAGNOSIS — R00.2 PALPITATIONS: ICD-10-CM

## 2025-03-11 DIAGNOSIS — F41.9 ANXIETY: Chronic | ICD-10-CM

## 2025-03-11 DIAGNOSIS — I10 HYPERTENSION, UNSPECIFIED TYPE: ICD-10-CM

## 2025-03-11 DIAGNOSIS — R07.9 CHEST PAIN, UNSPECIFIED TYPE: ICD-10-CM

## 2025-03-11 DIAGNOSIS — R42 DIZZINESS: ICD-10-CM

## 2025-03-11 DIAGNOSIS — R93.1 ABNORMAL FINDINGS ON DIAGNOSTIC IMAGING OF HEART AND CORONARY CIRCULATION: Primary | ICD-10-CM

## 2025-03-11 PROCEDURE — 3078F DIAST BP <80 MM HG: CPT | Performed by: INTERNAL MEDICINE

## 2025-03-11 PROCEDURE — 3074F SYST BP LT 130 MM HG: CPT | Performed by: INTERNAL MEDICINE

## 2025-03-11 PROCEDURE — 93000 ELECTROCARDIOGRAM COMPLETE: CPT | Performed by: INTERNAL MEDICINE

## 2025-03-11 PROCEDURE — 4004F PT TOBACCO SCREEN RCVD TLK: CPT | Performed by: INTERNAL MEDICINE

## 2025-03-11 PROCEDURE — G8419 CALC BMI OUT NRM PARAM NOF/U: HCPCS | Performed by: INTERNAL MEDICINE

## 2025-03-11 PROCEDURE — G8427 DOCREV CUR MEDS BY ELIG CLIN: HCPCS | Performed by: INTERNAL MEDICINE

## 2025-03-11 PROCEDURE — 3017F COLORECTAL CA SCREEN DOC REV: CPT | Performed by: INTERNAL MEDICINE

## 2025-03-11 PROCEDURE — 99214 OFFICE O/P EST MOD 30 MIN: CPT | Performed by: INTERNAL MEDICINE

## 2025-03-11 RX ORDER — SODIUM CHLORIDE 9 MG/ML
INJECTION, SOLUTION INTRAVENOUS PRN
OUTPATIENT
Start: 2025-03-11

## 2025-03-11 RX ORDER — SODIUM CHLORIDE 0.9 % (FLUSH) 0.9 %
5-40 SYRINGE (ML) INJECTION EVERY 12 HOURS SCHEDULED
OUTPATIENT
Start: 2025-03-11

## 2025-03-11 RX ORDER — METOPROLOL TARTRATE 1 MG/ML
5 INJECTION, SOLUTION INTRAVENOUS EVERY 5 MIN PRN
OUTPATIENT
Start: 2025-03-11

## 2025-03-11 RX ORDER — SODIUM CHLORIDE 0.9 % (FLUSH) 0.9 %
5-40 SYRINGE (ML) INJECTION PRN
OUTPATIENT
Start: 2025-03-11

## 2025-03-11 RX ORDER — METOPROLOL TARTRATE 50 MG
TABLET ORAL
Qty: 3 TABLET | Refills: 0 | Status: SHIPPED | OUTPATIENT
Start: 2025-03-11

## 2025-03-11 RX ORDER — NITROGLYCERIN 0.4 MG/1
0.4 TABLET SUBLINGUAL
OUTPATIENT
Start: 2025-03-11 | End: 2025-03-12

## 2025-03-11 RX ORDER — NITROGLYCERIN 0.4 MG/1
0.8 TABLET SUBLINGUAL
OUTPATIENT
Start: 2025-03-11 | End: 2025-03-12

## 2025-03-11 NOTE — PATIENT INSTRUCTIONS
Patients is a current smoker. Advised patient to quit and offered support.  Educational material provided to patient.   1-800-QUIT-NOW  No change in medications today  Cardiac CT to further evaluate   To schedule outpatient testing, contact Central Scheduling by calling  ([phone]).

## 2025-03-12 RX ORDER — METOPROLOL TARTRATE 50 MG
TABLET ORAL
Qty: 3 TABLET | Refills: 0 | OUTPATIENT
Start: 2025-03-12

## 2025-04-27 DIAGNOSIS — I10 ESSENTIAL HYPERTENSION: ICD-10-CM

## 2025-04-27 RX ORDER — LOSARTAN POTASSIUM 100 MG/1
TABLET ORAL
Qty: 90 TABLET | Refills: 0 | Status: SHIPPED | OUTPATIENT
Start: 2025-04-27

## 2025-04-27 RX ORDER — VERAPAMIL HYDROCHLORIDE 240 MG/1
TABLET, FILM COATED, EXTENDED RELEASE ORAL
Qty: 90 TABLET | Refills: 1 | Status: SHIPPED | OUTPATIENT
Start: 2025-04-27

## 2025-06-02 DIAGNOSIS — E03.9 ACQUIRED HYPOTHYROIDISM: ICD-10-CM

## 2025-06-02 RX ORDER — LEVOTHYROXINE SODIUM 25 UG/1
TABLET ORAL
Qty: 30 TABLET | Refills: 3 | Status: SHIPPED | OUTPATIENT
Start: 2025-06-02

## 2025-07-02 DIAGNOSIS — M19.90 ARTHRITIS: ICD-10-CM

## 2025-07-02 RX ORDER — MELOXICAM 15 MG/1
15 TABLET ORAL DAILY
Qty: 30 TABLET | Refills: 3 | Status: SHIPPED | OUTPATIENT
Start: 2025-07-02

## 2025-07-24 ENCOUNTER — OFFICE VISIT (OUTPATIENT)
Dept: FAMILY MEDICINE CLINIC | Age: 65
End: 2025-07-24
Payer: MEDICARE

## 2025-07-24 VITALS
DIASTOLIC BLOOD PRESSURE: 78 MMHG | WEIGHT: 197.2 LBS | HEART RATE: 56 BPM | SYSTOLIC BLOOD PRESSURE: 150 MMHG | BODY MASS INDEX: 28.3 KG/M2 | OXYGEN SATURATION: 98 %

## 2025-07-24 DIAGNOSIS — M25.552 LEFT HIP PAIN: ICD-10-CM

## 2025-07-24 DIAGNOSIS — M89.8X1 PAIN OF RIGHT CLAVICLE: Primary | ICD-10-CM

## 2025-07-24 DIAGNOSIS — R79.0 LOW MAGNESIUM LEVEL: ICD-10-CM

## 2025-07-24 DIAGNOSIS — F90.9 ATTENTION DEFICIT HYPERACTIVITY DISORDER (ADHD), UNSPECIFIED ADHD TYPE: ICD-10-CM

## 2025-07-24 DIAGNOSIS — E03.9 HYPOTHYROIDISM, UNSPECIFIED TYPE: ICD-10-CM

## 2025-07-24 DIAGNOSIS — R73.03 PREDIABETES: ICD-10-CM

## 2025-07-24 DIAGNOSIS — E55.9 VITAMIN D DEFICIENCY: ICD-10-CM

## 2025-07-24 DIAGNOSIS — E78.00 HIGH CHOLESTEROL: ICD-10-CM

## 2025-07-24 DIAGNOSIS — F41.9 ANXIETY: Chronic | ICD-10-CM

## 2025-07-24 DIAGNOSIS — I10 ESSENTIAL HYPERTENSION: ICD-10-CM

## 2025-07-24 PROCEDURE — 1123F ACP DISCUSS/DSCN MKR DOCD: CPT | Performed by: NURSE PRACTITIONER

## 2025-07-24 PROCEDURE — G8427 DOCREV CUR MEDS BY ELIG CLIN: HCPCS | Performed by: NURSE PRACTITIONER

## 2025-07-24 PROCEDURE — 99214 OFFICE O/P EST MOD 30 MIN: CPT | Performed by: NURSE PRACTITIONER

## 2025-07-24 PROCEDURE — 3077F SYST BP >= 140 MM HG: CPT | Performed by: NURSE PRACTITIONER

## 2025-07-24 PROCEDURE — 4004F PT TOBACCO SCREEN RCVD TLK: CPT | Performed by: NURSE PRACTITIONER

## 2025-07-24 PROCEDURE — G8419 CALC BMI OUT NRM PARAM NOF/U: HCPCS | Performed by: NURSE PRACTITIONER

## 2025-07-24 PROCEDURE — 3017F COLORECTAL CA SCREEN DOC REV: CPT | Performed by: NURSE PRACTITIONER

## 2025-07-24 PROCEDURE — 3078F DIAST BP <80 MM HG: CPT | Performed by: NURSE PRACTITIONER

## 2025-07-24 ASSESSMENT — ENCOUNTER SYMPTOMS
SHORTNESS OF BREATH: 0
WHEEZING: 0

## 2025-07-24 NOTE — PROGRESS NOTES
Patient: Gabe Guzman Jr is a 65 y.o. male who presents today with the following Chief Complaint(s):  Chief Complaint   Patient presents with    Referral - General     New referral-Psychiatry. Current   Dr Stevenson is retiring         HPI  Pain in right clavical area- worse over the last 3 months- notices with certain movements   Taking mobic 15mg daily but not helpful- will have PT evaluate  Also having left hip pain- hard time getting comfortable in bed      Finger left pinky fingers states it gets stuck and he has to pull the finger up- noticed recently    Bilateral toenail black- no injury he is aware of- looks bruised    Psychiatrist is retiring- needs new psychiatrist  ADHD: adderall takes 60mg BID- discussed that I could prescribe med if he weans down to 60mg daily   Anxiety and depression: he states he has weaned down on the xanax- gets 60 per month- has enough for 2 a day., cymbalta 60mg dialy, malictal 150mg daily   HTN: losartan 100mg daily, verapamil 240mg daily, chlorthalidone     Current Outpatient Medications   Medication Sig Dispense Refill    meloxicam (MOBIC) 15 MG tablet TAKE 1 TABLET BY MOUTH DAILY 30 tablet 3    levothyroxine (SYNTHROID) 25 MCG tablet TAKE 1 TABLET(25 MCG) BY MOUTH DAILY ON AN EMPTY STOMACH 30 tablet 3    verapamil (CALAN SR) 240 MG extended release tablet TAKE 1 TABLET BY MOUTH ONCE NIGHTLY 90 tablet 1    losartan (COZAAR) 100 MG tablet TAKE 1 TABLET(100 MG) BY MOUTH ONCE DAILY 90 tablet 0    metoprolol tartrate (LOPRESSOR) 50 MG tablet Two hours before your CTA test check your heart rate, take by mouth 1 tablet if your heart rate is 55-70, 2 tablets if your heart rate is 71-80, 3 tablets if heart rate greater than 80, and no tablets if heart rate is less than 55. 3 tablet 0    DULoxetine (CYMBALTA) 60 MG extended release capsule Take 1 capsule by mouth daily      lamoTRIgine (LAMICTAL) 150 MG tablet Take 1 tablet by mouth daily      ondansetron (ZOFRAN-ODT) 4 MG disintegrating

## 2025-07-24 NOTE — PATIENT INSTRUCTIONS
[facility]  Address: [address] [place], [place] [address], [place] [address]  Hours: Open ? Closes 4:30?PM  Phone: [phone]    [facility]: psychiatry in [place]

## 2025-07-24 NOTE — ASSESSMENT & PLAN NOTE
Chronic, at goal (stable), continue current treatment plan  he states he has weaned down on the xanax- gets 60 per month- has enough for 2 a day., cymbalta 60mg dialy, malictal 150mg daily

## 2025-07-24 NOTE — ASSESSMENT & PLAN NOTE
Chronic, at goal (stable), discussed if he can wean down to 60mg adderall daily I can prescribe but not more than that a day

## 2025-08-01 DIAGNOSIS — I10 ESSENTIAL HYPERTENSION: ICD-10-CM

## 2025-08-01 RX ORDER — LOSARTAN POTASSIUM 100 MG/1
TABLET ORAL
Qty: 90 TABLET | Refills: 0 | Status: SHIPPED | OUTPATIENT
Start: 2025-08-01

## 2025-08-01 NOTE — TELEPHONE ENCOUNTER
Last Office Visit  -  7/24/25  Next Office Visit  -  8/25/25    Last Filled  -    Last UDS -    Contract -

## 2025-08-07 ENCOUNTER — TELEPHONE (OUTPATIENT)
Dept: FAMILY MEDICINE CLINIC | Age: 65
End: 2025-08-07

## 2025-08-07 RX ORDER — VERAPAMIL HYDROCHLORIDE 240 MG/1
240 CAPSULE, DELAYED RELEASE ORAL NIGHTLY
Qty: 90 CAPSULE | Refills: 3 | Status: SHIPPED | OUTPATIENT
Start: 2025-08-07

## 2025-08-25 ENCOUNTER — OFFICE VISIT (OUTPATIENT)
Dept: FAMILY MEDICINE CLINIC | Age: 65
End: 2025-08-25
Payer: MEDICARE

## 2025-08-25 VITALS
SYSTOLIC BLOOD PRESSURE: 160 MMHG | OXYGEN SATURATION: 99 % | BODY MASS INDEX: 28.49 KG/M2 | HEIGHT: 70 IN | DIASTOLIC BLOOD PRESSURE: 90 MMHG | HEART RATE: 62 BPM | WEIGHT: 199 LBS

## 2025-08-25 DIAGNOSIS — I10 PRIMARY HYPERTENSION: Primary | ICD-10-CM

## 2025-08-25 DIAGNOSIS — M54.42 CHRONIC LEFT-SIDED LOW BACK PAIN WITH LEFT-SIDED SCIATICA: ICD-10-CM

## 2025-08-25 DIAGNOSIS — F41.9 ANXIETY: ICD-10-CM

## 2025-08-25 DIAGNOSIS — G89.29 CHRONIC LEFT-SIDED LOW BACK PAIN WITH LEFT-SIDED SCIATICA: ICD-10-CM

## 2025-08-25 DIAGNOSIS — F90.9 ATTENTION DEFICIT HYPERACTIVITY DISORDER (ADHD), UNSPECIFIED ADHD TYPE: ICD-10-CM

## 2025-08-25 PROCEDURE — 3079F DIAST BP 80-89 MM HG: CPT | Performed by: NURSE PRACTITIONER

## 2025-08-25 PROCEDURE — 1123F ACP DISCUSS/DSCN MKR DOCD: CPT | Performed by: NURSE PRACTITIONER

## 2025-08-25 PROCEDURE — G8427 DOCREV CUR MEDS BY ELIG CLIN: HCPCS | Performed by: NURSE PRACTITIONER

## 2025-08-25 PROCEDURE — 4004F PT TOBACCO SCREEN RCVD TLK: CPT | Performed by: NURSE PRACTITIONER

## 2025-08-25 PROCEDURE — 3077F SYST BP >= 140 MM HG: CPT | Performed by: NURSE PRACTITIONER

## 2025-08-25 PROCEDURE — 99214 OFFICE O/P EST MOD 30 MIN: CPT | Performed by: NURSE PRACTITIONER

## 2025-08-25 PROCEDURE — 3017F COLORECTAL CA SCREEN DOC REV: CPT | Performed by: NURSE PRACTITIONER

## 2025-08-25 PROCEDURE — G8419 CALC BMI OUT NRM PARAM NOF/U: HCPCS | Performed by: NURSE PRACTITIONER

## 2025-08-25 RX ORDER — CYCLOBENZAPRINE HCL 10 MG
10 TABLET ORAL NIGHTLY PRN
Qty: 30 TABLET | Refills: 0 | Status: SHIPPED | OUTPATIENT
Start: 2025-08-25 | End: 2025-09-04

## 2025-08-25 RX ORDER — VERAPAMIL HYDROCHLORIDE 360 MG/1
360 CAPSULE, DELAYED RELEASE ORAL NIGHTLY
Qty: 90 CAPSULE | Refills: 3 | Status: SHIPPED | OUTPATIENT
Start: 2025-08-25

## 2025-08-25 RX ORDER — DEXTROAMPHETAMINE SACCHARATE, AMPHETAMINE ASPARTATE, DEXTROAMPHETAMINE SULFATE AND AMPHETAMINE SULFATE 7.5; 7.5; 7.5; 7.5 MG/1; MG/1; MG/1; MG/1
TABLET ORAL
Qty: 60 TABLET | Refills: 0 | Status: SHIPPED | OUTPATIENT
Start: 2025-08-30 | End: 2025-09-29

## 2025-08-25 RX ORDER — ALPRAZOLAM 1 MG/1
1 TABLET ORAL NIGHTLY PRN
Qty: 60 TABLET | Refills: 0 | Status: SHIPPED | OUTPATIENT
Start: 2025-08-30 | End: 2025-09-29

## 2025-08-25 ASSESSMENT — ENCOUNTER SYMPTOMS
SHORTNESS OF BREATH: 0
WHEEZING: 0

## 2025-09-02 ENCOUNTER — TELEPHONE (OUTPATIENT)
Dept: FAMILY MEDICINE CLINIC | Age: 65
End: 2025-09-02

## 2025-09-03 DIAGNOSIS — F90.9 ATTENTION DEFICIT HYPERACTIVITY DISORDER (ADHD), UNSPECIFIED ADHD TYPE: ICD-10-CM

## 2025-09-03 DIAGNOSIS — I10 UNCONTROLLED HYPERTENSION: Primary | ICD-10-CM

## 2025-09-03 RX ORDER — AMLODIPINE BESYLATE 10 MG/1
10 TABLET ORAL DAILY
Qty: 90 TABLET | Refills: 3 | Status: SHIPPED | OUTPATIENT
Start: 2025-09-03

## 2025-09-04 ENCOUNTER — HOSPITAL ENCOUNTER (OUTPATIENT)
Dept: PHYSICAL THERAPY | Age: 65
Setting detail: THERAPIES SERIES
Discharge: HOME | End: 2025-09-04
Payer: MEDICARE

## 2025-09-04 PROCEDURE — 97140 MANUAL THERAPY 1/> REGIONS: CPT

## 2025-09-04 PROCEDURE — 97161 PT EVAL LOW COMPLEX 20 MIN: CPT

## 2025-09-04 PROCEDURE — 97110 THERAPEUTIC EXERCISES: CPT

## 2025-09-04 RX ORDER — DEXTROAMPHETAMINE SACCHARATE, AMPHETAMINE ASPARTATE, DEXTROAMPHETAMINE SULFATE AND AMPHETAMINE SULFATE 7.5; 7.5; 7.5; 7.5 MG/1; MG/1; MG/1; MG/1
TABLET ORAL
Qty: 60 TABLET | Refills: 0 | Status: SHIPPED | OUTPATIENT
Start: 2025-09-04 | End: 2025-10-04

## (undated) DEVICE — GLOVE SURG SZ 7 CRM LTX FREE POLYISOPRENE POLYMER BEAD ANTI

## (undated) DEVICE — ENDO CARRY-ON PROCEDURE KIT INCLUDES SUCTION TUBING, LUBRICANT, GAUZE, BIOHAZARD STICKER, TRANSPORT PAD AND INTERCEPT BEDSIDE KIT.: Brand: ENDO CARRY-ON PROCEDURE KIT

## (undated) DEVICE — GARMENT,MEDLINE,DVT,INT,CALF,MED, GEN2: Brand: MEDLINE

## (undated) DEVICE — COVER LT HNDL BLU PLAS

## (undated) DEVICE — ELECTRODE ECG MONITR FOAM TEAR DROP ADLT RED

## (undated) DEVICE — PAD,ABDOMINAL,5"X9",ST,LF,25/BX: Brand: MEDLINE INDUSTRIES, INC.

## (undated) DEVICE — TRAY PREP DRY W/ PREM GLV 2 APPL 6 SPNG 2 UNDPD 1 OVERWRAP

## (undated) DEVICE — UNDERPANTS INCONT XL 45-70IN KNIT SEAMLESS DSGN COLOR-CODED

## (undated) DEVICE — JEWISH HOSPITAL TURNOVER KIT: Brand: MEDLINE INDUSTRIES, INC.

## (undated) DEVICE — STANDARD HYPODERMIC NEEDLE,POLYPROPYLENE HUB: Brand: MONOJECT

## (undated) DEVICE — SHEET,T,THYROID,STERILE: Brand: MEDLINE

## (undated) DEVICE — ST FLUFF LG 1 PLY: Brand: DEROYAL

## (undated) DEVICE — SURGICAL SET UP - SURE SET: Brand: MEDLINE INDUSTRIES, INC.

## (undated) DEVICE — PENCIL ES ULT VAC W TELSCP NOSE EZ CLN BLDE 10FT

## (undated) DEVICE — PLATE ES AD W 9FT CRD 2

## (undated) DEVICE — SURE SET-DOUBLE BASIN-LF: Brand: MEDLINE INDUSTRIES, INC.